# Patient Record
Sex: FEMALE | Race: WHITE | Employment: OTHER | URBAN - METROPOLITAN AREA
[De-identification: names, ages, dates, MRNs, and addresses within clinical notes are randomized per-mention and may not be internally consistent; named-entity substitution may affect disease eponyms.]

---

## 2017-01-09 ENCOUNTER — HOSPITAL ENCOUNTER (OUTPATIENT)
Dept: OTHER | Age: 77
Discharge: HOME | End: 2017-01-09
Attending: INTERNAL MEDICINE | Admitting: INTERNAL MEDICINE

## 2017-01-09 LAB
A/G RATIO: 1.4 (ref 1.1–2.2)
ALBUMIN SERPL-MCNC: 4.2 G/DL (ref 3.4–5)
ALP BLD-CCNC: 84 U/L (ref 40–129)
ALT SERPL-CCNC: 65 U/L (ref 10–40)
ANION GAP SERPL CALCULATED.3IONS-SCNC: 12 MMOL/L (ref 3–16)
AST SERPL-CCNC: 52 U/L (ref 15–37)
BILIRUB SERPL-MCNC: 0.9 MG/DL (ref 0–1)
BUN BLDV-MCNC: 13 MG/DL (ref 7–20)
CALCIUM SERPL-MCNC: 9.1 MG/DL (ref 8.3–10.6)
CHLORIDE BLD-SCNC: 104 MMOL/L (ref 99–110)
CHOLESTEROL, TOTAL: 169 MG/DL (ref 0–199)
CO2: 25 MMOL/L (ref 21–32)
CREAT SERPL-MCNC: <0.5 MG/DL (ref 0.6–1.2)
FOLATE: 7.47 NG/ML (ref 4.78–24.2)
GFR AFRICAN AMERICAN: >60
GFR NON-AFRICAN AMERICAN: >60
GLOBULIN: 3 G/DL
GLUCOSE BLD-MCNC: 91 MG/DL (ref 70–99)
HDLC SERPL-MCNC: 42 MG/DL (ref 40–60)
LDL CHOLESTEROL CALCULATED: 92 MG/DL
POTASSIUM SERPL-SCNC: 3.8 MMOL/L (ref 3.5–5.1)
SODIUM BLD-SCNC: 141 MMOL/L (ref 136–145)
TOTAL PROTEIN: 7.2 G/DL (ref 6.4–8.2)
TRIGL SERPL-MCNC: 173 MG/DL (ref 0–150)
TSH SERPL DL<=0.05 MIU/L-ACNC: 2.45 UIU/ML (ref 0.27–4.2)
VITAMIN B-12: 559 PG/ML (ref 211–911)
VITAMIN D 25-HYDROXY: 25.8 NG/ML
VLDLC SERPL CALC-MCNC: 35 MG/DL

## 2017-01-10 LAB
ESTIMATED AVERAGE GLUCOSE: 108.3 MG/DL
HBA1C MFR BLD: 5.4 %

## 2017-01-13 LAB
BASOPHILS ABSOLUTE: 0.1 K/UL (ref 0–0.2)
BASOPHILS RELATIVE PERCENT: 1 %
EOSINOPHILS ABSOLUTE: 0.3 K/UL (ref 0–0.6)
EOSINOPHILS RELATIVE PERCENT: 2.5 %
HCT VFR BLD CALC: 43.5 % (ref 36–48)
HEMATOLOGY PATH CONSULT: NO
HEMOGLOBIN: 14.5 G/DL (ref 12–16)
LYMPHOCYTES ABSOLUTE: 5.4 K/UL (ref 1–5.1)
LYMPHOCYTES RELATIVE PERCENT: 43.6 %
MCH RBC QN AUTO: 30.6 PG (ref 26–34)
MCHC RBC AUTO-ENTMCNC: 33.2 G/DL (ref 31–36)
MCV RBC AUTO: 92.3 FL (ref 80–100)
MONOCYTES ABSOLUTE: 0.7 K/UL (ref 0–1.3)
MONOCYTES RELATIVE PERCENT: 5.8 %
NEUTROPHILS ABSOLUTE: 5.8 K/UL (ref 1.7–7.7)
NEUTROPHILS RELATIVE PERCENT: 47.1 %
PDW BLD-RTO: 13.5 % (ref 12.4–15.4)
PLATELET # BLD: 256 K/UL (ref 135–450)
PMV BLD AUTO: 7.2 FL (ref 5–10.5)
RBC # BLD: 4.72 M/UL (ref 4–5.2)
WBC # BLD: 12.4 K/UL (ref 4–11)

## 2017-03-02 ENCOUNTER — TELEPHONE (OUTPATIENT)
Dept: CARDIOLOGY CLINIC | Age: 77
End: 2017-03-02

## 2017-03-08 ENCOUNTER — OFFICE VISIT (OUTPATIENT)
Dept: INTERNAL MEDICINE CLINIC | Age: 77
End: 2017-03-08

## 2017-03-08 VITALS
HEART RATE: 80 BPM | BODY MASS INDEX: 33.3 KG/M2 | SYSTOLIC BLOOD PRESSURE: 130 MMHG | WEIGHT: 188 LBS | DIASTOLIC BLOOD PRESSURE: 84 MMHG

## 2017-03-08 DIAGNOSIS — R20.2 PARESTHESIA OF BOTH HANDS: ICD-10-CM

## 2017-03-08 DIAGNOSIS — G24.8 FOCAL DYSTONIA: Primary | ICD-10-CM

## 2017-03-08 PROCEDURE — 4040F PNEUMOC VAC/ADMIN/RCVD: CPT | Performed by: INTERNAL MEDICINE

## 2017-03-08 PROCEDURE — G8599 NO ASA/ANTIPLAT THER USE RNG: HCPCS | Performed by: INTERNAL MEDICINE

## 2017-03-08 PROCEDURE — G8484 FLU IMMUNIZE NO ADMIN: HCPCS | Performed by: INTERNAL MEDICINE

## 2017-03-08 PROCEDURE — G8399 PT W/DXA RESULTS DOCUMENT: HCPCS | Performed by: INTERNAL MEDICINE

## 2017-03-08 PROCEDURE — 1036F TOBACCO NON-USER: CPT | Performed by: INTERNAL MEDICINE

## 2017-03-08 PROCEDURE — G8427 DOCREV CUR MEDS BY ELIG CLIN: HCPCS | Performed by: INTERNAL MEDICINE

## 2017-03-08 PROCEDURE — G8417 CALC BMI ABV UP PARAM F/U: HCPCS | Performed by: INTERNAL MEDICINE

## 2017-03-08 PROCEDURE — 1090F PRES/ABSN URINE INCON ASSESS: CPT | Performed by: INTERNAL MEDICINE

## 2017-03-08 PROCEDURE — 99213 OFFICE O/P EST LOW 20 MIN: CPT | Performed by: INTERNAL MEDICINE

## 2017-03-08 PROCEDURE — 1123F ACP DISCUSS/DSCN MKR DOCD: CPT | Performed by: INTERNAL MEDICINE

## 2017-05-02 ENCOUNTER — PROCEDURE VISIT (OUTPATIENT)
Dept: NEUROLOGY | Age: 77
End: 2017-05-02

## 2017-05-02 ENCOUNTER — OFFICE VISIT (OUTPATIENT)
Dept: NEUROLOGY | Age: 77
End: 2017-05-02

## 2017-05-02 VITALS
BODY MASS INDEX: 33.13 KG/M2 | HEIGHT: 63 IN | HEART RATE: 91 BPM | DIASTOLIC BLOOD PRESSURE: 76 MMHG | WEIGHT: 187 LBS | SYSTOLIC BLOOD PRESSURE: 118 MMHG

## 2017-05-02 DIAGNOSIS — G56.01 RIGHT CARPAL TUNNEL SYNDROME: Primary | ICD-10-CM

## 2017-05-02 DIAGNOSIS — G56.22 ULNAR NEUROPATHY OF LEFT UPPER EXTREMITY: ICD-10-CM

## 2017-05-02 DIAGNOSIS — G24.8 FOCAL DYSTONIA: ICD-10-CM

## 2017-05-02 DIAGNOSIS — R25.2 MUSCLE CRAMPING: Primary | ICD-10-CM

## 2017-05-02 DIAGNOSIS — G56.01 RIGHT CARPAL TUNNEL SYNDROME: ICD-10-CM

## 2017-05-02 PROCEDURE — 99204 OFFICE O/P NEW MOD 45 MIN: CPT | Performed by: PSYCHIATRY & NEUROLOGY

## 2017-05-02 PROCEDURE — 1123F ACP DISCUSS/DSCN MKR DOCD: CPT | Performed by: PSYCHIATRY & NEUROLOGY

## 2017-05-02 PROCEDURE — G8417 CALC BMI ABV UP PARAM F/U: HCPCS | Performed by: PSYCHIATRY & NEUROLOGY

## 2017-05-02 PROCEDURE — 4040F PNEUMOC VAC/ADMIN/RCVD: CPT | Performed by: PSYCHIATRY & NEUROLOGY

## 2017-05-02 PROCEDURE — 95911 NRV CNDJ TEST 9-10 STUDIES: CPT | Performed by: PSYCHIATRY & NEUROLOGY

## 2017-05-02 PROCEDURE — 95886 MUSC TEST DONE W/N TEST COMP: CPT | Performed by: PSYCHIATRY & NEUROLOGY

## 2017-05-02 PROCEDURE — G8427 DOCREV CUR MEDS BY ELIG CLIN: HCPCS | Performed by: PSYCHIATRY & NEUROLOGY

## 2017-05-02 PROCEDURE — G8599 NO ASA/ANTIPLAT THER USE RNG: HCPCS | Performed by: PSYCHIATRY & NEUROLOGY

## 2017-05-02 PROCEDURE — 1090F PRES/ABSN URINE INCON ASSESS: CPT | Performed by: PSYCHIATRY & NEUROLOGY

## 2017-05-02 PROCEDURE — 1036F TOBACCO NON-USER: CPT | Performed by: PSYCHIATRY & NEUROLOGY

## 2017-05-02 PROCEDURE — G8399 PT W/DXA RESULTS DOCUMENT: HCPCS | Performed by: PSYCHIATRY & NEUROLOGY

## 2017-05-22 RX ORDER — AMLODIPINE BESYLATE 5 MG/1
TABLET ORAL
Qty: 60 TABLET | Refills: 2 | Status: SHIPPED | OUTPATIENT
Start: 2017-05-22 | End: 2017-06-16 | Stop reason: SDUPTHER

## 2017-06-16 ENCOUNTER — OFFICE VISIT (OUTPATIENT)
Dept: INTERNAL MEDICINE CLINIC | Age: 77
End: 2017-06-16

## 2017-06-16 VITALS
HEART RATE: 88 BPM | OXYGEN SATURATION: 94 % | DIASTOLIC BLOOD PRESSURE: 79 MMHG | RESPIRATION RATE: 12 BRPM | WEIGHT: 191 LBS | HEIGHT: 63 IN | BODY MASS INDEX: 33.84 KG/M2 | SYSTOLIC BLOOD PRESSURE: 129 MMHG

## 2017-06-16 DIAGNOSIS — E78.5 DYSLIPIDEMIA: ICD-10-CM

## 2017-06-16 DIAGNOSIS — I25.10 CAD IN NATIVE ARTERY: ICD-10-CM

## 2017-06-16 DIAGNOSIS — M85.80 OSTEOPENIA: ICD-10-CM

## 2017-06-16 DIAGNOSIS — Z78.9 STATIN INTOLERANCE: ICD-10-CM

## 2017-06-16 DIAGNOSIS — R73.01 IMPAIRED FASTING BLOOD SUGAR: ICD-10-CM

## 2017-06-16 DIAGNOSIS — I10 BENIGN ESSENTIAL HTN: Primary | ICD-10-CM

## 2017-06-16 DIAGNOSIS — Z13.820 SCREENING FOR OSTEOPOROSIS: ICD-10-CM

## 2017-06-16 DIAGNOSIS — R10.33 PERIUMBILICAL ABDOMINAL PAIN: ICD-10-CM

## 2017-06-16 PROCEDURE — G8399 PT W/DXA RESULTS DOCUMENT: HCPCS | Performed by: INTERNAL MEDICINE

## 2017-06-16 PROCEDURE — 1036F TOBACCO NON-USER: CPT | Performed by: INTERNAL MEDICINE

## 2017-06-16 PROCEDURE — G8417 CALC BMI ABV UP PARAM F/U: HCPCS | Performed by: INTERNAL MEDICINE

## 2017-06-16 PROCEDURE — 99214 OFFICE O/P EST MOD 30 MIN: CPT | Performed by: INTERNAL MEDICINE

## 2017-06-16 PROCEDURE — G8599 NO ASA/ANTIPLAT THER USE RNG: HCPCS | Performed by: INTERNAL MEDICINE

## 2017-06-16 PROCEDURE — 1090F PRES/ABSN URINE INCON ASSESS: CPT | Performed by: INTERNAL MEDICINE

## 2017-06-16 PROCEDURE — 4040F PNEUMOC VAC/ADMIN/RCVD: CPT | Performed by: INTERNAL MEDICINE

## 2017-06-16 PROCEDURE — 1123F ACP DISCUSS/DSCN MKR DOCD: CPT | Performed by: INTERNAL MEDICINE

## 2017-06-16 PROCEDURE — G8427 DOCREV CUR MEDS BY ELIG CLIN: HCPCS | Performed by: INTERNAL MEDICINE

## 2017-06-16 ASSESSMENT — PATIENT HEALTH QUESTIONNAIRE - PHQ9
SUM OF ALL RESPONSES TO PHQ QUESTIONS 1-9: 0
1. LITTLE INTEREST OR PLEASURE IN DOING THINGS: 0
SUM OF ALL RESPONSES TO PHQ9 QUESTIONS 1 & 2: 0
2. FEELING DOWN, DEPRESSED OR HOPELESS: 0

## 2017-08-09 ENCOUNTER — HOSPITAL ENCOUNTER (OUTPATIENT)
Dept: GENERAL RADIOLOGY | Age: 77
Discharge: HOME | End: 2017-08-09
Attending: INTERNAL MEDICINE | Admitting: INTERNAL MEDICINE

## 2017-08-09 DIAGNOSIS — Z13.820 ENCOUNTER FOR SCREENING FOR OSTEOPOROSIS: ICD-10-CM

## 2017-08-09 DIAGNOSIS — Z13.820 SCREENING FOR OSTEOPOROSIS: ICD-10-CM

## 2017-08-09 DIAGNOSIS — M85.80 OSTEOPENIA: ICD-10-CM

## 2017-08-09 DIAGNOSIS — Z12.31 VISIT FOR SCREENING MAMMOGRAM: ICD-10-CM

## 2017-08-21 RX ORDER — AMLODIPINE BESYLATE 5 MG/1
TABLET ORAL
Qty: 60 TABLET | Refills: 3 | Status: SHIPPED | OUTPATIENT
Start: 2017-08-21 | End: 2018-03-16 | Stop reason: SDUPTHER

## 2017-10-31 ENCOUNTER — HOSPITAL ENCOUNTER (OUTPATIENT)
Dept: OTHER | Age: 77
Discharge: HOME | End: 2017-10-31
Attending: INTERNAL MEDICINE | Admitting: INTERNAL MEDICINE

## 2017-10-31 LAB
A/G RATIO: 1.5 (ref 1.1–2.2)
ALBUMIN SERPL-MCNC: 4.4 G/DL (ref 3.4–5)
ALP BLD-CCNC: 96 U/L (ref 40–129)
ALT SERPL-CCNC: 43 U/L (ref 10–40)
AMYLASE: 55 U/L (ref 25–115)
ANION GAP SERPL CALCULATED.3IONS-SCNC: 15 MMOL/L (ref 3–16)
AST SERPL-CCNC: 36 U/L (ref 15–37)
BASOPHILS ABSOLUTE: 0.1 K/UL (ref 0–0.2)
BASOPHILS RELATIVE PERCENT: 0.7 %
BILIRUB SERPL-MCNC: 0.9 MG/DL (ref 0–1)
BUN BLDV-MCNC: 17 MG/DL (ref 7–20)
CALCIUM SERPL-MCNC: 9.3 MG/DL (ref 8.3–10.6)
CHLORIDE BLD-SCNC: 100 MMOL/L (ref 99–110)
CHOLESTEROL, TOTAL: 176 MG/DL (ref 0–199)
CO2: 26 MMOL/L (ref 21–32)
CREAT SERPL-MCNC: 0.5 MG/DL (ref 0.6–1.2)
EOSINOPHILS ABSOLUTE: 0.3 K/UL (ref 0–0.6)
EOSINOPHILS RELATIVE PERCENT: 1.9 %
GFR AFRICAN AMERICAN: >60
GFR NON-AFRICAN AMERICAN: >60
GLOBULIN: 3 G/DL
GLUCOSE BLD-MCNC: 94 MG/DL (ref 70–99)
HCT VFR BLD CALC: 45.9 % (ref 36–48)
HDLC SERPL-MCNC: 40 MG/DL (ref 40–60)
HEMATOLOGY PATH CONSULT: NO
HEMOGLOBIN: 15.5 G/DL (ref 12–16)
LDL CHOLESTEROL CALCULATED: 96 MG/DL
LIPASE: 25 U/L (ref 13–60)
LYMPHOCYTES ABSOLUTE: 5.8 K/UL (ref 1–5.1)
LYMPHOCYTES RELATIVE PERCENT: 43.4 %
MCH RBC QN AUTO: 31.6 PG (ref 26–34)
MCHC RBC AUTO-ENTMCNC: 33.9 G/DL (ref 31–36)
MCV RBC AUTO: 93.2 FL (ref 80–100)
MONOCYTES ABSOLUTE: 0.8 K/UL (ref 0–1.3)
MONOCYTES RELATIVE PERCENT: 6.1 %
NEUTROPHILS ABSOLUTE: 6.4 K/UL (ref 1.7–7.7)
NEUTROPHILS RELATIVE PERCENT: 47.9 %
PDW BLD-RTO: 13.2 % (ref 12.4–15.4)
PLATELET # BLD: 270 K/UL (ref 135–450)
PMV BLD AUTO: 7.6 FL (ref 5–10.5)
POTASSIUM SERPL-SCNC: 3.8 MMOL/L (ref 3.5–5.1)
RBC # BLD: 4.92 M/UL (ref 4–5.2)
SODIUM BLD-SCNC: 141 MMOL/L (ref 136–145)
TOTAL PROTEIN: 7.4 G/DL (ref 6.4–8.2)
TRIGL SERPL-MCNC: 199 MG/DL (ref 0–150)
VLDLC SERPL CALC-MCNC: 40 MG/DL
WBC # BLD: 13.3 K/UL (ref 4–11)

## 2017-11-01 LAB
ESTIMATED AVERAGE GLUCOSE: 111.2 MG/DL
HBA1C MFR BLD: 5.5 %

## 2017-11-02 LAB — TISSUE TRANSGLUTAMINASE IGA: 1 U/ML (ref 0–3)

## 2017-12-01 RX ORDER — AMLODIPINE BESYLATE 5 MG/1
TABLET ORAL
Qty: 60 TABLET | Refills: 3 | Status: SHIPPED | OUTPATIENT
Start: 2017-12-01 | End: 2018-03-16 | Stop reason: SDUPTHER

## 2018-03-16 ENCOUNTER — OFFICE VISIT (OUTPATIENT)
Dept: INTERNAL MEDICINE CLINIC | Age: 78
End: 2018-03-16

## 2018-03-16 VITALS
HEART RATE: 66 BPM | DIASTOLIC BLOOD PRESSURE: 82 MMHG | SYSTOLIC BLOOD PRESSURE: 126 MMHG | WEIGHT: 186 LBS | HEIGHT: 63 IN | BODY MASS INDEX: 32.96 KG/M2 | RESPIRATION RATE: 16 BRPM

## 2018-03-16 DIAGNOSIS — I10 BENIGN ESSENTIAL HTN: Primary | ICD-10-CM

## 2018-03-16 DIAGNOSIS — Z78.9 STATIN INTOLERANCE: ICD-10-CM

## 2018-03-16 DIAGNOSIS — Z23 NEED FOR TDAP VACCINATION: ICD-10-CM

## 2018-03-16 DIAGNOSIS — R21 SKIN RASH: ICD-10-CM

## 2018-03-16 DIAGNOSIS — I25.10 CAD IN NATIVE ARTERY: ICD-10-CM

## 2018-03-16 DIAGNOSIS — R32 URINARY INCONTINENCE, UNSPECIFIED TYPE: ICD-10-CM

## 2018-03-16 DIAGNOSIS — R73.01 IMPAIRED FASTING BLOOD SUGAR: ICD-10-CM

## 2018-03-16 DIAGNOSIS — E78.5 DYSLIPIDEMIA: ICD-10-CM

## 2018-03-16 DIAGNOSIS — M81.0 OSTEOPOROSIS, POSTMENOPAUSAL: ICD-10-CM

## 2018-03-16 DIAGNOSIS — M85.80 OSTEOPENIA, UNSPECIFIED LOCATION: ICD-10-CM

## 2018-03-16 PROCEDURE — 4040F PNEUMOC VAC/ADMIN/RCVD: CPT | Performed by: INTERNAL MEDICINE

## 2018-03-16 PROCEDURE — G8484 FLU IMMUNIZE NO ADMIN: HCPCS | Performed by: INTERNAL MEDICINE

## 2018-03-16 PROCEDURE — 1090F PRES/ABSN URINE INCON ASSESS: CPT | Performed by: INTERNAL MEDICINE

## 2018-03-16 PROCEDURE — G8599 NO ASA/ANTIPLAT THER USE RNG: HCPCS | Performed by: INTERNAL MEDICINE

## 2018-03-16 PROCEDURE — G8399 PT W/DXA RESULTS DOCUMENT: HCPCS | Performed by: INTERNAL MEDICINE

## 2018-03-16 PROCEDURE — G8427 DOCREV CUR MEDS BY ELIG CLIN: HCPCS | Performed by: INTERNAL MEDICINE

## 2018-03-16 PROCEDURE — 1036F TOBACCO NON-USER: CPT | Performed by: INTERNAL MEDICINE

## 2018-03-16 PROCEDURE — G8417 CALC BMI ABV UP PARAM F/U: HCPCS | Performed by: INTERNAL MEDICINE

## 2018-03-16 PROCEDURE — 99214 OFFICE O/P EST MOD 30 MIN: CPT | Performed by: INTERNAL MEDICINE

## 2018-03-16 PROCEDURE — 1123F ACP DISCUSS/DSCN MKR DOCD: CPT | Performed by: INTERNAL MEDICINE

## 2018-03-16 RX ORDER — CYANOCOBALAMIN (VITAMIN B-12) 1000 MCG
1000 TABLET, EXTENDED RELEASE ORAL DAILY
COMMUNITY
End: 2021-10-26

## 2018-03-16 RX ORDER — LORATADINE 10 MG/1
10 TABLET ORAL DAILY
COMMUNITY
End: 2019-12-11 | Stop reason: ALTCHOICE

## 2018-03-16 NOTE — PATIENT INSTRUCTIONS
causing a serious injury or death. The safety of vaccines is always being monitored. For more information, visit: www.cdc.gov/vaccinesafety. What if there is a serious problem? What should I look for? · Look for anything that concerns you, such as signs of a severe allergic reaction, very high fever, or unusual behavior. Signs of a severe allergic reaction can include hives, swelling of the face and throat, difficulty breathing, a fast heartbeat, dizziness, and weakness. These would usually start a few minutes to a few hours after the vaccination. What should I do? · If you think it is a severe allergic reaction or other emergency that can't wait, call 9-1-1 or get the person to the nearest hospital. Otherwise, call your doctor. · Afterward, the reaction should be reported to the Vaccine Adverse Event Reporting System (VAERS). Your doctor might file this report, or you can do it yourself through the VAERS web site at www.vaers. hhs.gov, or by calling 0-230.694.4022. VAERS does not give medical advice. The National Vaccine Injury Compensation Program  The National Vaccine Injury Compensation Program (VICP) is a federal program that was created to compensate people who may have been injured by certain vaccines. Persons who believe they may have been injured by a vaccine can learn about the program and about filing a claim by calling 8-806.161.2262 or visiting the [employer] website at www.[facility].gov/vaccinecompensation. There is a time limit to file a claim for compensation. How can I learn more? · Ask your doctor. He or she can give you the vaccine package insert or suggest other sources of information. · Call your local or state health department. · Contact the Centers for Disease Control and Prevention (CDC):  ¨ Call 4-349.352.8014 (1-800-CDC-INFO) or  ¨ Visit CDC's website at www.cdc.gov/vaccines  Vaccine Information Statement (Interim)  Tdap Vaccine  (2/24/15)  [address] GILB[address] Stanley 732OF-53  Department of [facility] and

## 2018-03-19 ENCOUNTER — HOSPITAL ENCOUNTER (OUTPATIENT)
Dept: OTHER | Age: 78
Discharge: HOME | End: 2018-03-19
Attending: INTERNAL MEDICINE | Admitting: INTERNAL MEDICINE

## 2018-03-19 LAB
A/G RATIO: 1.5 (ref 1.1–2.2)
ALBUMIN SERPL-MCNC: 4.3 G/DL (ref 3.4–5)
ALP BLD-CCNC: 77 U/L (ref 40–129)
ALT SERPL-CCNC: 28 U/L (ref 10–40)
ANION GAP SERPL CALCULATED.3IONS-SCNC: 13 MMOL/L (ref 3–16)
AST SERPL-CCNC: 26 U/L (ref 15–37)
BANDED NEUTROPHILS RELATIVE PERCENT: 2 % (ref 0–7)
BASOPHILS ABSOLUTE: 0 K/UL (ref 0–0.2)
BASOPHILS RELATIVE PERCENT: 0 %
BILIRUB SERPL-MCNC: 0.8 MG/DL (ref 0–1)
BUN BLDV-MCNC: 19 MG/DL (ref 7–20)
CALCIUM SERPL-MCNC: 8.8 MG/DL (ref 8.3–10.6)
CHLORIDE BLD-SCNC: 104 MMOL/L (ref 99–110)
CHOLESTEROL, TOTAL: 162 MG/DL (ref 0–199)
CO2: 26 MMOL/L (ref 21–32)
CREAT SERPL-MCNC: 0.5 MG/DL (ref 0.6–1.2)
EOSINOPHILS ABSOLUTE: 0.4 K/UL (ref 0–0.6)
EOSINOPHILS RELATIVE PERCENT: 3 %
ESTIMATED AVERAGE GLUCOSE: 96.8 MG/DL
GFR AFRICAN AMERICAN: >60
GFR NON-AFRICAN AMERICAN: >60
GLOBULIN: 2.9 G/DL
GLUCOSE BLD-MCNC: 99 MG/DL (ref 70–99)
HBA1C MFR BLD: 5 %
HCT VFR BLD CALC: 45.4 % (ref 36–48)
HDLC SERPL-MCNC: 48 MG/DL (ref 40–60)
HEMATOLOGY PATH CONSULT: NO
HEMOGLOBIN: 15.5 G/DL (ref 12–16)
LDL CHOLESTEROL CALCULATED: 85 MG/DL
LYMPHOCYTES ABSOLUTE: 4.8 K/UL (ref 1–5.1)
LYMPHOCYTES RELATIVE PERCENT: 39 %
MCH RBC QN AUTO: 31.8 PG (ref 26–34)
MCHC RBC AUTO-ENTMCNC: 34.2 G/DL (ref 31–36)
MCV RBC AUTO: 93 FL (ref 80–100)
MONOCYTES ABSOLUTE: 0.9 K/UL (ref 0–1.3)
MONOCYTES RELATIVE PERCENT: 7 %
NEUTROPHILS ABSOLUTE: 6.3 K/UL (ref 1.7–7.7)
NEUTROPHILS RELATIVE PERCENT: 49 %
PDW BLD-RTO: 13.4 % (ref 12.4–15.4)
PLATELET # BLD: 298 K/UL (ref 135–450)
PLATELET SLIDE REVIEW: ADEQUATE
PMV BLD AUTO: 7.1 FL (ref 5–10.5)
POTASSIUM SERPL-SCNC: 4.3 MMOL/L (ref 3.5–5.1)
RBC # BLD: 4.88 M/UL (ref 4–5.2)
SLIDE REVIEW: ABNORMAL
SMUDGE CELLS: PRESENT
SODIUM BLD-SCNC: 143 MMOL/L (ref 136–145)
TOTAL PROTEIN: 7.2 G/DL (ref 6.4–8.2)
TRIGL SERPL-MCNC: 145 MG/DL (ref 0–150)
TSH SERPL DL<=0.05 MIU/L-ACNC: 2.62 UIU/ML (ref 0.27–4.2)
VITAMIN D 25-HYDROXY: 25.8 NG/ML
VLDLC SERPL CALC-MCNC: 29 MG/DL
WBC # BLD: 12.3 K/UL (ref 4–11)

## 2018-03-26 ENCOUNTER — OFFICE VISIT (OUTPATIENT)
Dept: INTERNAL MEDICINE CLINIC | Age: 78
End: 2018-03-26

## 2018-03-26 VITALS — DIASTOLIC BLOOD PRESSURE: 80 MMHG | BODY MASS INDEX: 32.76 KG/M2 | WEIGHT: 182 LBS | SYSTOLIC BLOOD PRESSURE: 144 MMHG

## 2018-03-26 DIAGNOSIS — R79.9 ABNORMAL BLOOD SMEAR: ICD-10-CM

## 2018-03-26 DIAGNOSIS — D72.829 LEUKOCYTOSIS, UNSPECIFIED TYPE: Primary | ICD-10-CM

## 2018-03-26 PROCEDURE — 4040F PNEUMOC VAC/ADMIN/RCVD: CPT | Performed by: INTERNAL MEDICINE

## 2018-03-26 PROCEDURE — G8417 CALC BMI ABV UP PARAM F/U: HCPCS | Performed by: INTERNAL MEDICINE

## 2018-03-26 PROCEDURE — G8484 FLU IMMUNIZE NO ADMIN: HCPCS | Performed by: INTERNAL MEDICINE

## 2018-03-26 PROCEDURE — 1123F ACP DISCUSS/DSCN MKR DOCD: CPT | Performed by: INTERNAL MEDICINE

## 2018-03-26 PROCEDURE — G8599 NO ASA/ANTIPLAT THER USE RNG: HCPCS | Performed by: INTERNAL MEDICINE

## 2018-03-26 PROCEDURE — 1090F PRES/ABSN URINE INCON ASSESS: CPT | Performed by: INTERNAL MEDICINE

## 2018-03-26 PROCEDURE — G8428 CUR MEDS NOT DOCUMENT: HCPCS | Performed by: INTERNAL MEDICINE

## 2018-03-26 PROCEDURE — 99213 OFFICE O/P EST LOW 20 MIN: CPT | Performed by: INTERNAL MEDICINE

## 2018-03-26 PROCEDURE — 1036F TOBACCO NON-USER: CPT | Performed by: INTERNAL MEDICINE

## 2018-03-26 PROCEDURE — G8399 PT W/DXA RESULTS DOCUMENT: HCPCS | Performed by: INTERNAL MEDICINE

## 2018-03-26 NOTE — PATIENT INSTRUCTIONS
Patient Education        Learning About High White Blood Cell Counts  What are white blood cells? White blood cells (leukocytes) help protect your body from infection. Normally, when germs get inside your body, your body makes more white blood cells that search for and destroy the germs. Less often, there are medical problems where the body may make a lot more white blood cells than it needs. What happens when you have a high white blood cell count? Your white blood cell count may be high because your body is fighting an infection. But other things can cause it, such as some medicines, burns, an illness, or other health problems. When your doctor sees that your white blood cell count is high, he or she will try to find out why, and then treat the cause. What are the symptoms? A high white blood cell count alone doesn't cause any symptoms. The symptoms you feel may come from the medical problem that your white blood cells are fighting. For example, if you have pneumonia, you may have a fever and trouble breathing. These are symptoms of pneumonia, not of a high white blood cell count. How is it treated? · Your doctor may do more tests to find the problem that's making your white blood cell count high. Once your doctor finds the problem, he or she may be able to treat it. · Part of your treatment may be telling your doctor if you feel worse. Watch your temperature, and call your doctor if your fever goes up and stays up. Follow-up care is a key part of your treatment and safety. Be sure to make and go to all appointments, and call your doctor if you are having problems. It's also a good idea to know your test results and keep a list of the medicines you take. Where can you learn more? Go to [email or web address] and sign in to your [employer] account. Enter B699 in the [employer] box to learn more about \"Learning About High White Blood Cell Counts. \"     If you do not have an account, please click on the \"Sign Up Now\" link. Current as of: October 14, 2016  Content Version: 11.5  © 7908-6315 Healthwise, Incorporated. Care instructions adapted under license by [facility] ([facility]). If you have questions about a medical condition or this instruction, always ask your healthcare professional. [address] any warranty or liability for your use of this information.

## 2018-03-26 NOTE — PROGRESS NOTES
Multiple Vitamins-Minerals (MULTIVITAMIN ADULT PO) Take by mouth  Historical Provider, MD   loratadine (CLARITIN) 10 MG tablet Take 10 mg by mouth daily  Historical Provider, MD   amLODIPine (NORVASC) 5 MG tablet TAKE 1 TABLET BY MOUTH TWICE DAILY  Kev Villafana DO   Cranberry 125 MG TABS Take by mouth  Historical Provider, MD   aspirin EC 81 MG EC tablet Take 1 tablet by mouth daily. Una Murray NP   Phytosterol Esters (CHOLEST CARE PO) Take  by mouth. Historical Provider, MD   nitroGLYCERIN (NITROSTAT) 0.4 MG SL tablet Place 1 tablet under the tongue every 5 minutes as needed for Chest pain. Una Murray NP           Review of Systems - As per HPI  GEN: Pt denies fever, chills or night sweats. Denies weight changes. Appetite good  HEENT: Pt denies visual and auditory changes, epistaxis, upper respiratory symptoms and dysphagia  CV: Pt denies CP, SOB, KRUEGER, orthopnea palpitations, LE swelling, and claudication. PULM: Pt denies cough, wheezing or sputum production  GI: Pt denies N/V/D, heart burn, rectal bleeding, or melena. NEURO: Pt denies unilateral weakness, paresthesia and dizziness. OBJECTIVE:  BP (!) 144/80   Wt 182 lb (82.6 kg)   BMI 32.76 kg/m²    Wt Readings from Last 3 Encounters:   03/26/18 182 lb (82.6 kg)   03/16/18 186 lb (84.4 kg)   06/16/17 191 lb (86.6 kg)       GEN: NAD, A&O, Non-toxic  HEENT: NC/AT, CHRISTIANE, EOMI, Oral cavity Clear,  TM's NL, Nasal cavity clear. NECK: Supple. No thyromegaly. LYMPH: No C/SC/A/F nodes. CV: S1 S2 NL, RRR. No murmurs, clicks or rubs. PULM: CTA, symmentric air exchange      ASSESSMENT[de-identified]  Mylinda Massing was seen today for results. Diagnoses and all orders for this visit:    Leukocytosis, unspecified type  -     Yasmine Pascual MD (ASHTYN)    Abnormal blood smear  -     Yasmine Pascual MD (ASHTYN)          15 minutes spent with the pt.   13 minutes spent reviewing test results and discussing plan of care and counseled on Elevated white blood cell count and different etiologies. Discussed medications with patient who voiced understanding of their use, indication and potential side effects. Pt also understands the above recommendations. All questions answered.

## 2018-04-03 ENCOUNTER — OFFICE VISIT (OUTPATIENT)
Dept: CARDIOLOGY CLINIC | Age: 78
End: 2018-04-03

## 2018-04-03 VITALS
SYSTOLIC BLOOD PRESSURE: 110 MMHG | DIASTOLIC BLOOD PRESSURE: 70 MMHG | BODY MASS INDEX: 32.6 KG/M2 | HEART RATE: 68 BPM | WEIGHT: 184 LBS | HEIGHT: 63 IN

## 2018-04-03 DIAGNOSIS — Z78.9 STATIN INTOLERANCE: Primary | ICD-10-CM

## 2018-04-03 DIAGNOSIS — I25.10 CAD IN NATIVE ARTERY: ICD-10-CM

## 2018-04-03 PROCEDURE — 4040F PNEUMOC VAC/ADMIN/RCVD: CPT | Performed by: NURSE PRACTITIONER

## 2018-04-03 PROCEDURE — 1036F TOBACCO NON-USER: CPT | Performed by: NURSE PRACTITIONER

## 2018-04-03 PROCEDURE — 1090F PRES/ABSN URINE INCON ASSESS: CPT | Performed by: NURSE PRACTITIONER

## 2018-04-03 PROCEDURE — G8417 CALC BMI ABV UP PARAM F/U: HCPCS | Performed by: NURSE PRACTITIONER

## 2018-04-03 PROCEDURE — G8599 NO ASA/ANTIPLAT THER USE RNG: HCPCS | Performed by: NURSE PRACTITIONER

## 2018-04-03 PROCEDURE — 99214 OFFICE O/P EST MOD 30 MIN: CPT | Performed by: NURSE PRACTITIONER

## 2018-04-03 PROCEDURE — G8427 DOCREV CUR MEDS BY ELIG CLIN: HCPCS | Performed by: NURSE PRACTITIONER

## 2018-04-03 PROCEDURE — 1123F ACP DISCUSS/DSCN MKR DOCD: CPT | Performed by: NURSE PRACTITIONER

## 2018-04-03 PROCEDURE — G8399 PT W/DXA RESULTS DOCUMENT: HCPCS | Performed by: NURSE PRACTITIONER

## 2018-04-16 ENCOUNTER — HOSPITAL ENCOUNTER (OUTPATIENT)
Dept: CT IMAGING | Age: 78
Discharge: HOME | End: 2018-04-16
Attending: INTERNAL MEDICINE | Admitting: INTERNAL MEDICINE

## 2018-04-16 DIAGNOSIS — D72.820 LYMPHOCYTOSIS (SYMPTOMATIC): ICD-10-CM

## 2018-04-16 DIAGNOSIS — D72.820 LYMPHOCYTOSIS: ICD-10-CM

## 2018-08-29 ENCOUNTER — TELEPHONE (OUTPATIENT)
Dept: INTERNAL MEDICINE CLINIC | Age: 78
End: 2018-08-29

## 2018-08-29 RX ORDER — AMLODIPINE BESYLATE 5 MG/1
TABLET ORAL
Qty: 180 TABLET | Refills: 0 | Status: SHIPPED | OUTPATIENT
Start: 2018-08-29 | End: 2018-11-16

## 2018-08-29 NOTE — TELEPHONE ENCOUNTER
Eric Sotomayor stated that pt is requesting a 90 day supply instead of 30 day for her Amlodipine. Please call phone# provided.

## 2018-11-07 ENCOUNTER — TELEPHONE (OUTPATIENT)
Dept: INTERNAL MEDICINE CLINIC | Age: 78
End: 2018-11-07

## 2018-11-08 ENCOUNTER — TELEPHONE (OUTPATIENT)
Dept: INTERNAL MEDICINE CLINIC | Age: 78
End: 2018-11-08

## 2018-11-08 ENCOUNTER — TELEPHONE (OUTPATIENT)
Dept: CARDIOLOGY CLINIC | Age: 78
End: 2018-11-08

## 2018-11-08 NOTE — TELEPHONE ENCOUNTER
Patient states she is now taking 1 amlodipine daily since she tried other things and her blood pressure dropped to 130/80 this morning but did not stay there. Patient can be reached at phone number provided with any questions or concerns.

## 2018-11-16 ENCOUNTER — OFFICE VISIT (OUTPATIENT)
Dept: CARDIOLOGY CLINIC | Age: 78
End: 2018-11-16
Payer: MEDICARE

## 2018-11-16 VITALS
WEIGHT: 189 LBS | SYSTOLIC BLOOD PRESSURE: 110 MMHG | HEART RATE: 64 BPM | HEIGHT: 63 IN | BODY MASS INDEX: 33.49 KG/M2 | DIASTOLIC BLOOD PRESSURE: 70 MMHG

## 2018-11-16 DIAGNOSIS — Z78.9 STATIN INTOLERANCE: ICD-10-CM

## 2018-11-16 DIAGNOSIS — I25.10 CAD IN NATIVE ARTERY: ICD-10-CM

## 2018-11-16 DIAGNOSIS — I10 BENIGN ESSENTIAL HTN: ICD-10-CM

## 2018-11-16 DIAGNOSIS — R07.9 CHEST PAIN, UNSPECIFIED TYPE: Primary | ICD-10-CM

## 2018-11-16 PROCEDURE — G8484 FLU IMMUNIZE NO ADMIN: HCPCS | Performed by: NURSE PRACTITIONER

## 2018-11-16 PROCEDURE — G8417 CALC BMI ABV UP PARAM F/U: HCPCS | Performed by: NURSE PRACTITIONER

## 2018-11-16 PROCEDURE — 1123F ACP DISCUSS/DSCN MKR DOCD: CPT | Performed by: NURSE PRACTITIONER

## 2018-11-16 PROCEDURE — 1101F PT FALLS ASSESS-DOCD LE1/YR: CPT | Performed by: NURSE PRACTITIONER

## 2018-11-16 PROCEDURE — 4040F PNEUMOC VAC/ADMIN/RCVD: CPT | Performed by: NURSE PRACTITIONER

## 2018-11-16 PROCEDURE — G8399 PT W/DXA RESULTS DOCUMENT: HCPCS | Performed by: NURSE PRACTITIONER

## 2018-11-16 PROCEDURE — 1090F PRES/ABSN URINE INCON ASSESS: CPT | Performed by: NURSE PRACTITIONER

## 2018-11-16 PROCEDURE — 1036F TOBACCO NON-USER: CPT | Performed by: NURSE PRACTITIONER

## 2018-11-16 PROCEDURE — G8427 DOCREV CUR MEDS BY ELIG CLIN: HCPCS | Performed by: NURSE PRACTITIONER

## 2018-11-16 PROCEDURE — 99214 OFFICE O/P EST MOD 30 MIN: CPT | Performed by: NURSE PRACTITIONER

## 2018-11-16 PROCEDURE — G8599 NO ASA/ANTIPLAT THER USE RNG: HCPCS | Performed by: NURSE PRACTITIONER

## 2018-11-16 RX ORDER — VITAMIN B COMPLEX
TABLET ORAL
COMMUNITY

## 2018-11-16 RX ORDER — MAGNESIUM 30 MG
30 TABLET ORAL 2 TIMES DAILY
COMMUNITY

## 2018-11-26 ENCOUNTER — OFFICE VISIT (OUTPATIENT)
Dept: INTERNAL MEDICINE CLINIC | Age: 78
End: 2018-11-26
Payer: MEDICARE

## 2018-11-26 VITALS
DIASTOLIC BLOOD PRESSURE: 82 MMHG | BODY MASS INDEX: 35.51 KG/M2 | RESPIRATION RATE: 12 BRPM | HEART RATE: 88 BPM | HEIGHT: 62 IN | WEIGHT: 193 LBS | SYSTOLIC BLOOD PRESSURE: 132 MMHG

## 2018-11-26 DIAGNOSIS — Z23 FLU VACCINE NEED: ICD-10-CM

## 2018-11-26 DIAGNOSIS — E78.5 DYSLIPIDEMIA: ICD-10-CM

## 2018-11-26 DIAGNOSIS — I25.10 CAD IN NATIVE ARTERY: ICD-10-CM

## 2018-11-26 DIAGNOSIS — Z78.9 STATIN INTOLERANCE: ICD-10-CM

## 2018-11-26 DIAGNOSIS — M85.80 OSTEOPENIA, UNSPECIFIED LOCATION: ICD-10-CM

## 2018-11-26 DIAGNOSIS — I10 BENIGN ESSENTIAL HTN: Primary | ICD-10-CM

## 2018-11-26 DIAGNOSIS — R73.01 IMPAIRED FASTING BLOOD SUGAR: ICD-10-CM

## 2018-11-26 PROCEDURE — 99214 OFFICE O/P EST MOD 30 MIN: CPT | Performed by: INTERNAL MEDICINE

## 2018-11-26 PROCEDURE — G8417 CALC BMI ABV UP PARAM F/U: HCPCS | Performed by: INTERNAL MEDICINE

## 2018-11-26 PROCEDURE — G8599 NO ASA/ANTIPLAT THER USE RNG: HCPCS | Performed by: INTERNAL MEDICINE

## 2018-11-26 PROCEDURE — 4040F PNEUMOC VAC/ADMIN/RCVD: CPT | Performed by: INTERNAL MEDICINE

## 2018-11-26 PROCEDURE — G8427 DOCREV CUR MEDS BY ELIG CLIN: HCPCS | Performed by: INTERNAL MEDICINE

## 2018-11-26 PROCEDURE — G8482 FLU IMMUNIZE ORDER/ADMIN: HCPCS | Performed by: INTERNAL MEDICINE

## 2018-11-26 PROCEDURE — G0008 ADMIN INFLUENZA VIRUS VAC: HCPCS | Performed by: INTERNAL MEDICINE

## 2018-11-26 PROCEDURE — 1123F ACP DISCUSS/DSCN MKR DOCD: CPT | Performed by: INTERNAL MEDICINE

## 2018-11-26 PROCEDURE — 1036F TOBACCO NON-USER: CPT | Performed by: INTERNAL MEDICINE

## 2018-11-26 PROCEDURE — G8399 PT W/DXA RESULTS DOCUMENT: HCPCS | Performed by: INTERNAL MEDICINE

## 2018-11-26 PROCEDURE — 90662 IIV NO PRSV INCREASED AG IM: CPT | Performed by: INTERNAL MEDICINE

## 2018-11-26 PROCEDURE — 1090F PRES/ABSN URINE INCON ASSESS: CPT | Performed by: INTERNAL MEDICINE

## 2018-11-26 PROCEDURE — 1101F PT FALLS ASSESS-DOCD LE1/YR: CPT | Performed by: INTERNAL MEDICINE

## 2018-11-26 ASSESSMENT — PATIENT HEALTH QUESTIONNAIRE - PHQ9
1. LITTLE INTEREST OR PLEASURE IN DOING THINGS: 0
SUM OF ALL RESPONSES TO PHQ9 QUESTIONS 1 & 2: 0
SUM OF ALL RESPONSES TO PHQ QUESTIONS 1-9: 0
SUM OF ALL RESPONSES TO PHQ QUESTIONS 1-9: 0
2. FEELING DOWN, DEPRESSED OR HOPELESS: 0

## 2018-11-26 NOTE — PATIENT INSTRUCTIONS
A serving is 1 slice of bread, 1 ounce of dry cereal, or ½ cup of cooked rice, pasta, or cooked cereal. Try to choose whole-grain products as much as possible. · Limit lean meat, poultry, and fish to 2 servings each day. A serving is 3 ounces, about the size of a deck of cards. · Eat 4 to 5 servings of nuts, seeds, and legumes (cooked dried beans, lentils, and split peas) each week. A serving is 1/3 cup of nuts, 2 tablespoons of seeds, or ½ cup of cooked beans or peas. · Limit fats and oils to 2 to 3 servings each day. A serving is 1 teaspoon of vegetable oil or 2 tablespoons of salad dressing. · Limit sweets and added sugars to 5 servings or less a week. A serving is 1 tablespoon jelly or jam, ½ cup sorbet, or 1 cup of lemonade. · Eat less than 2,300 milligrams (mg) of sodium a day. If you limit your sodium to 1,500 mg a day, you can lower your blood pressure even more. Tips for success  · Start small. Do not try to make dramatic changes to your diet all at once. You might feel that you are missing out on your favorite foods and then be more likely to not follow the plan. Make small changes, and stick with them. Once those changes become habit, add a few more changes. · Try some of the following:  ? Make it a goal to eat a fruit or vegetable at every meal and at snacks. This will make it easy to get the recommended amount of fruits and vegetables each day. ? Try yogurt topped with fruit and nuts for a snack or healthy dessert. ? Add lettuce, tomato, cucumber, and onion to sandwiches. ? Combine a ready-made pizza crust with low-fat mozzarella cheese and lots of vegetable toppings. Try using tomatoes, squash, spinach, broccoli, carrots, cauliflower, and onions. ? Have a variety of cut-up vegetables with a low-fat dip as an appetizer instead of chips and dip. ? Sprinkle sunflower seeds or chopped almonds over salads. Or try adding chopped walnuts or almonds to cooked vegetables.   ? Try some vegetarian meals using beans and peas. Add garbanzo or kidney beans to salads. Make burritos and tacos with mashed lugo beans or black beans. Where can you learn more? Go to [email or web address]://[email or web address].[employer]. org and sign in to your [employer] account. Enter Q795 in the [facility] box to learn more about \"DASH Diet: Care Instructions. \"     If you do not have an account, please click on the \"Sign Up Now\" link. Current as of: December 6, 2017  Content Version: 11.8  © 1551-7080 [employer]. Care instructions adapted under license by [facility] ([facility]). If you have questions about a medical condition or this instruction, always ask your healthcare professional. [address] any warranty or liability for your use of this information. Patient Education        Learning About High Blood Pressure  What is high blood pressure? Blood pressure is a measure of how hard the blood pushes against the walls of your arteries. It's normal for blood pressure to go up and down throughout the day, but if it stays up, you have high blood pressure. Another name for high blood pressure is hypertension. Two numbers tell you your blood pressure. The first number is the systolic pressure. It shows how hard the blood pushes when your heart is pumping. The second number is the diastolic pressure. It shows how hard the blood pushes between heartbeats, when your heart is relaxed and filling with blood. A blood pressure of less than 120/80 (say \"120 over 80\") is ideal for an adult. High blood pressure is 130/80 or higher. You have high blood pressure if your top number is 130 or higher or your bottom number is 80 or higher, or both. What happens when you have high blood pressure? · Blood flows through your arteries with too much force. Over time, this damages the walls of your arteries. But you can't feel it. High blood pressure usually doesn't cause symptoms.   · Fat and calcium start to build up medicines. Follow-up care is a key part of your treatment and safety. Be sure to make and go to all appointments, and call your doctor if you are having problems. It's also a good idea to know your test results and keep a list of the medicines you take. How can you care for yourself at home? · Eat a variety of foods every day. Good choices include fruits, vegetables, whole grains (like oatmeal), dried beans and peas, nuts and seeds, soy products (like tofu), and fat-free or low-fat dairy products. · Replace butter, margarine, and hydrogenated or partially hydrogenated oils with olive and canola oils. (Canola oil margarine without trans fat is fine.)  · Replace red meat with fish, poultry, and soy protein (like tofu). · Limit processed and packaged foods like chips, crackers, and cookies. · Bake, broil, or steam foods. Don't lindsay them. · Be physically active. Get at least 30 minutes of exercise on most days of the week. Walking is a good choice. You also may want to do other activities, such as running, swimming, cycling, or playing tennis or team sports. · Stay at a healthy weight or lose weight by making the changes in eating and physical activity listed above. Losing just a small amount of weight, even 5 to 10 pounds, can reduce your risk for having a heart attack or stroke. · Do not smoke. When should you call for help? Watch closely for changes in your health, and be sure to contact your doctor if:    · You need help making lifestyle changes.     · You have questions about your medicine. Where can you learn more? Go to [email or web address].[email or web address] org and sign in to your [employer] account. Enter C520 in the [employer] box to learn more about \"High Cholesterol: Care Instructions. \"     If you do not have an account, please click on the \"Sign Up Now\" link. Current as of: December 6, 2017  Content Version: 11.8  © 9110-3818 Healthwise, [employer].  Care instructions adapted under license by [facility] ([facility]). If you have questions about a medical condition or this instruction, always ask your healthcare professional. [place] [address] any warranty or liability for your use of this information.

## 2018-11-26 NOTE — PROGRESS NOTES
history---->  She has H/O PTCA with three stents 7/30/2012 after suffering an acute MI. Interval history----> as noted above she has visited with the cardiology nurse practitioner area no new changes were made. She remains on aspirin therapy. She denies any new problems with angina or anginal equivalents. She specifically denies any chest pain or tightness, shortness of breath, orthopnea, edema. She denies any dizziness or syncope. She is scheduled for a stress test 11/29/2018. She does not like taking ASA. Osteopenia/Osteoporosis-- her last bone density test was 8/9/2017. That test was consistent with osteoporosis and osteopenia. She refuses additional therapy. She will be due for DEXA 8/2019. She is taking a Vit D with Vitamin K. Past Medical History:   Diagnosis Date    Allergic rhinitis     Arthropathy of cervical facet joint     CAD (coronary artery disease) 7/12/2012    DDD (degenerative disc disease), cervical     Diverticulosis 10/21/2010    Fatty liver     Foraminal stenosis of cervical region     GERD (gastroesophageal reflux disease)     Hepatomegaly     Hyperlipidemia     Hypertension     Impaired fasting glucose     Kidney stones     [facility] [address] 7/12/2012    Movement disorder     Obesity     Osteopenia     Osteoporosis     Right carpal tunnel syndrome 05/02/2017    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 OBJECTIVE:  /82   Pulse 88   Resp 12   Ht 5' 2\" (1.575 m)   Wt 193 lb (87.5 kg)   BMI 35.30 kg/m²   Wt Readings from Last 3 Encounters:   11/26/18 193 lb (87.5 kg)   11/14/18 189 lb (85.7 kg)   04/03/18 184 lb (83.5 kg)     BP Readings from Last 3 Encounters:   11/26/18 132/82   11/14/18 110/70   04/03/18 110/70       GEN: NAD, A&O, Non-toxic  HEENT: NC/AT, CHRISTIANE, EOMI, Anicteric.   Oral cavity Clear,

## 2018-11-29 ENCOUNTER — HOSPITAL ENCOUNTER (OUTPATIENT)
Dept: NON INVASIVE DIAGNOSTICS | Age: 78
Discharge: HOME | End: 2018-11-29
Payer: MEDICARE

## 2018-11-29 LAB
LV EF: 67 %
LVEF MODALITY: NORMAL

## 2018-11-29 PROCEDURE — 3430000000 HC RX DIAGNOSTIC RADIOPHARMACEUTICAL: Performed by: NURSE PRACTITIONER

## 2018-11-29 PROCEDURE — A9502 TC99M TETROFOSMIN: HCPCS | Performed by: NURSE PRACTITIONER

## 2018-11-29 PROCEDURE — 93017 CV STRESS TEST TRACING ONLY: CPT | Performed by: INTERNAL MEDICINE

## 2018-11-29 PROCEDURE — 6360000002 HC RX W HCPCS: Performed by: INTERNAL MEDICINE

## 2018-11-29 PROCEDURE — 78452 HT MUSCLE IMAGE SPECT MULT: CPT

## 2018-11-29 RX ORDER — AMINOPHYLLINE DIHYDRATE 25 MG/ML
100 INJECTION, SOLUTION INTRAVENOUS ONCE
Status: COMPLETED | OUTPATIENT
Start: 2018-11-29 | End: 2018-11-29

## 2018-11-29 RX ADMIN — REGADENOSON 0.4 MG: 0.08 INJECTION, SOLUTION INTRAVENOUS at 14:18

## 2018-11-29 RX ADMIN — TETROFOSMIN 30 MILLICURIE: 0.23 INJECTION, POWDER, LYOPHILIZED, FOR SOLUTION INTRAVENOUS at 14:18

## 2018-11-29 RX ADMIN — TETROFOSMIN 10 MILLICURIE: 0.23 INJECTION, POWDER, LYOPHILIZED, FOR SOLUTION INTRAVENOUS at 13:01

## 2018-11-29 RX ADMIN — AMINOPHYLLINE 100 MG: 25 INJECTION, SOLUTION INTRAVENOUS at 14:19

## 2019-01-02 ENCOUNTER — HOSPITAL ENCOUNTER (OUTPATIENT)
Dept: WOMENS IMAGING | Age: 79
Discharge: HOME | End: 2019-01-02
Payer: MEDICARE

## 2019-01-02 DIAGNOSIS — Z12.39 BREAST CANCER SCREENING: ICD-10-CM

## 2019-01-02 PROCEDURE — 77063 BREAST TOMOSYNTHESIS BI: CPT

## 2019-02-06 RX ORDER — AMLODIPINE BESYLATE 5 MG/1
TABLET ORAL
Qty: 180 TABLET | Refills: 0 | Status: SHIPPED | OUTPATIENT
Start: 2019-02-06 | End: 2019-05-29 | Stop reason: SDUPTHER

## 2019-05-02 ENCOUNTER — ANESTHESIA EVENT (OUTPATIENT)
Dept: ENDOSCOPY | Age: 79
End: 2019-05-02
Payer: MEDICARE

## 2019-05-02 NOTE — PROGRESS NOTES
Name_______________________________________Printed:____________________  Date and time of surgery_5/20 1030_______________________Arrival Time:__0900______________   1. Do not eat or drink anything after 12 midnight (or____hours) prior to surgery. This includes no water, chewing gum or mints. Endoscopy patients follow your doctors bowel prep instructions,which may include taking part of prep after midnight. 2. Take the following pills with a small sip of water on the morning of surgery__norvasc_________________________________________________                  Do not take blood pressure medications ending in pril or sartan the emigdio prior to surgery or the morning of surgery_   3. Aspirin, Ibuprofen, Advil, Naproxen, Vitamin E and other Anti-inflammatory products should be stopped for 5 days before surgery or as directed by your physician. 4. Check with your Doctor regarding stopping Plavix, Coumadin,Eliquis, Lovenox,Effient,Pradaxa,Xarelto, Fragmin or other blood thinners and follow their instructions. 5. Do not smoke, and do not drink any alcoholic beverages 24 hours prior to surgery. This includes NA Beer. Refrain from the usage of any recreational drugs. 6. You may brush your teeth and gargle the morning of surgery. DO NOT SWALLOW WATER   7. You MUST make arrangements for a responsible adult to stay on site while you are here and take you home after your surgery. You will not be allowed to leave alone or drive yourself home. It is strongly suggested someone stay with you the first 24 hrs. Your surgery will be cancelled if you do not have a ride home. 8. A parent/legal guardian must accompany a child scheduled for surgery and plan to stay at the hospital until the child is discharged. Please do not bring other children with you.    9. Please wear simple, loose fitting clothing to the hospital.  Rosalba Arvind not bring valuables (money, credit cards, checkbooks, etc.) Do not wear any makeup (including no eye makeup) or nail polish on your fingers or toes. 10. DO NOT wear any jewelry or piercings on day of surgery. All body piercing jewelry must be removed. 11. If you have ___dentures, they will be removed before going to the OR; we will provide you a container. If you wear ___contact lenses or ___glasses, they will be removed; please bring a case for them. 12. Please see your family doctor/pediatrician for a history & physical and/or concerning medications. Bring any test results/reports from your physician's office. PCP__________________Phone___________H&P Appt. Date________             13 If you  have a Living Will and Durable Power of  for Healthcare, please bring in a copy. 15. Notify your Surgeon if you develop any illness between now and surgery  time, cough, cold, fever, sore throat, nausea, vomiting, etc.  Please notify your surgeon if you experience dizziness, shortness of breath or blurred vision between now & the time of your surgery             15. DO NOT shave your operative site 96 hours prior to surgery. For face & neck surgery, men may use an electric razor 48 hours prior to surgery. 16. Shower the night before surgery with ___Antibacterial soap ___Hibiclens             17. To provide excellent care visitors will be limited to one in the room at any given time. 18.  Please bring picture ID and insurance card. 19.  Visit our web site for additional information:  [email or web address]              20.During flu season no children under the age of 15 are permitted in the hospital for the safety of all patients.                               21. If you take a long acting insulin in the evening only  take half of your usual  dose the night  before your procedure              22. If you use a c-pap please bring DOS if staying overnight,             23.For your convenience [id number] [facility] has a pharmacy on site to fill your

## 2019-05-16 ENCOUNTER — TELEPHONE (OUTPATIENT)
Dept: INTERNAL MEDICINE CLINIC | Age: 79
End: 2019-05-16

## 2019-05-20 ENCOUNTER — HOSPITAL ENCOUNTER (OUTPATIENT)
Age: 79
Setting detail: OUTPATIENT SURGERY
Discharge: HOME | End: 2019-05-20
Attending: INTERNAL MEDICINE | Admitting: INTERNAL MEDICINE
Payer: MEDICARE

## 2019-05-20 ENCOUNTER — ANESTHESIA (OUTPATIENT)
Dept: ENDOSCOPY | Age: 79
End: 2019-05-20
Payer: MEDICARE

## 2019-05-20 VITALS
HEART RATE: 69 BPM | RESPIRATION RATE: 14 BRPM | OXYGEN SATURATION: 99 % | BODY MASS INDEX: 34.96 KG/M2 | HEIGHT: 62 IN | DIASTOLIC BLOOD PRESSURE: 75 MMHG | TEMPERATURE: 97.6 F | SYSTOLIC BLOOD PRESSURE: 127 MMHG | WEIGHT: 190 LBS

## 2019-05-20 VITALS
RESPIRATION RATE: 12 BRPM | DIASTOLIC BLOOD PRESSURE: 72 MMHG | SYSTOLIC BLOOD PRESSURE: 129 MMHG | OXYGEN SATURATION: 91 %

## 2019-05-20 PROCEDURE — 3609010600 HC COLONOSCOPY POLYPECTOMY SNARE/COLD BIOPSY: Performed by: INTERNAL MEDICINE

## 2019-05-20 PROCEDURE — 3700000001 HC ADD 15 MINUTES (ANESTHESIA): Performed by: INTERNAL MEDICINE

## 2019-05-20 PROCEDURE — 6360000002 HC RX W HCPCS: Performed by: NURSE ANESTHETIST, CERTIFIED REGISTERED

## 2019-05-20 PROCEDURE — 3609010300 HC COLONOSCOPY W/BIOPSY SINGLE/MULTIPLE: Performed by: INTERNAL MEDICINE

## 2019-05-20 PROCEDURE — 2709999900 HC NON-CHARGEABLE SUPPLY: Performed by: INTERNAL MEDICINE

## 2019-05-20 PROCEDURE — 2580000003 HC RX 258: Performed by: NURSE ANESTHETIST, CERTIFIED REGISTERED

## 2019-05-20 PROCEDURE — 7100000011 HC PHASE II RECOVERY - ADDTL 15 MIN: Performed by: INTERNAL MEDICINE

## 2019-05-20 PROCEDURE — 88305 TISSUE EXAM BY PATHOLOGIST: CPT

## 2019-05-20 PROCEDURE — 2500000003 HC RX 250 WO HCPCS: Performed by: NURSE ANESTHETIST, CERTIFIED REGISTERED

## 2019-05-20 PROCEDURE — 3700000000 HC ANESTHESIA ATTENDED CARE: Performed by: INTERNAL MEDICINE

## 2019-05-20 PROCEDURE — 2580000003 HC RX 258: Performed by: ANESTHESIOLOGY

## 2019-05-20 PROCEDURE — 7100000010 HC PHASE II RECOVERY - FIRST 15 MIN: Performed by: INTERNAL MEDICINE

## 2019-05-20 RX ORDER — SODIUM CHLORIDE 9 MG/ML
INJECTION, SOLUTION INTRAVENOUS CONTINUOUS
Status: DISCONTINUED | OUTPATIENT
Start: 2019-05-20 | End: 2019-05-20 | Stop reason: HOSPADM

## 2019-05-20 RX ORDER — PROPOFOL 10 MG/ML
INJECTION, EMULSION INTRAVENOUS PRN
Status: DISCONTINUED | OUTPATIENT
Start: 2019-05-20 | End: 2019-05-20 | Stop reason: SDUPTHER

## 2019-05-20 RX ORDER — LIDOCAINE HYDROCHLORIDE 20 MG/ML
INJECTION, SOLUTION EPIDURAL; INFILTRATION; INTRACAUDAL; PERINEURAL PRN
Status: DISCONTINUED | OUTPATIENT
Start: 2019-05-20 | End: 2019-05-20 | Stop reason: SDUPTHER

## 2019-05-20 RX ORDER — SODIUM CHLORIDE 9 MG/ML
INJECTION, SOLUTION INTRAVENOUS CONTINUOUS PRN
Status: DISCONTINUED | OUTPATIENT
Start: 2019-05-20 | End: 2019-05-20 | Stop reason: SDUPTHER

## 2019-05-20 RX ADMIN — PROPOFOL 20 MG: 10 INJECTION, EMULSION INTRAVENOUS at 10:50

## 2019-05-20 RX ADMIN — PROPOFOL 20 MG: 10 INJECTION, EMULSION INTRAVENOUS at 10:56

## 2019-05-20 RX ADMIN — PROPOFOL 20 MG: 10 INJECTION, EMULSION INTRAVENOUS at 10:59

## 2019-05-20 RX ADMIN — PROPOFOL 80 MG: 10 INJECTION, EMULSION INTRAVENOUS at 10:48

## 2019-05-20 RX ADMIN — LIDOCAINE HYDROCHLORIDE 30 MG: 20 INJECTION, SOLUTION EPIDURAL; INFILTRATION; INTRACAUDAL; PERINEURAL at 10:48

## 2019-05-20 RX ADMIN — SODIUM CHLORIDE: 9 INJECTION, SOLUTION INTRAVENOUS at 10:34

## 2019-05-20 RX ADMIN — PROPOFOL 20 MG: 10 INJECTION, EMULSION INTRAVENOUS at 10:52

## 2019-05-20 RX ADMIN — PROPOFOL 20 MG: 10 INJECTION, EMULSION INTRAVENOUS at 11:03

## 2019-05-20 ASSESSMENT — PAIN SCALES - GENERAL
PAINLEVEL_OUTOF10: 0

## 2019-05-20 ASSESSMENT — PAIN - FUNCTIONAL ASSESSMENT: PAIN_FUNCTIONAL_ASSESSMENT: 0-10

## 2019-05-20 ASSESSMENT — LIFESTYLE VARIABLES: SMOKING_STATUS: 0

## 2019-05-20 NOTE — ANESTHESIA PRE PROCEDURE
Department of Anesthesiology  Preprocedure Note       Name:  Luis Fernando Andersen   Age:  78 y.o.  :  1940                                          MRN:  [id number]         Date:  2019      Surgeon: Janessa Reza):  Ericka Lockett MD    Procedure: COLONOSCOPY (N/A )    Medications prior to admission:   Prior to Admission medications    Medication Sig Start Date End Date Taking? Authorizing Provider   amLODIPine (NORVASC) 5 MG tablet TAKE 1 TABLET BY MOUTH TWICE DAILY 18  Yes Kev Villafana DO   loratadine (CLARITIN) 10 MG tablet Take 10 mg by mouth daily   Yes Historical Provider, MD   amLODIPine (NORVASC) 5 MG tablet TAKE 1 TABLET BY MOUTH TWICE DAILY 19   Kev Villafana DO   magnesium 30 MG tablet Take 30 mg by mouth 2 times daily    Historical Provider, MD   Coenzyme Q10 (COQ10) 100 MG CAPS Take by mouth    Historical Provider, MD   Vitamin D-Vitamin K (K2 PLUS D3 PO) Take by mouth    Historical Provider, MD   Cyanocobalamin (VITAMIN B-12) 1000 MCG extended release tablet Take 1,000 mcg by mouth daily    Historical Provider, MD   Multiple Vitamins-Minerals (MULTIVITAMIN ADULT PO) Take by mouth    Historical Provider, MD   Cranberry 125 MG TABS Take by mouth    Historical Provider, MD   aspirin EC 81 MG EC tablet Take 1 tablet by mouth daily. 14   YVETTE Foy CNP   Phytosterol Esters (CHOLEST CARE PO) Take  by mouth. Historical Provider, MD   nitroGLYCERIN (NITROSTAT) 0.4 MG SL tablet Place 1 tablet under the tongue every 5 minutes as needed for Chest pain. 12   YVETTE Foy CNP       Current medications:    Current Facility-Administered Medications   Medication Dose Route Frequency Provider Last Rate Last Dose    0.9 % sodium chloride infusion   Intravenous Continuous Shailesh Mcclelland MD           Allergies:     Allergies   Allergen Reactions    Latex      welt    Antihistamine [Diphenhydramine Hcl] Shortness Of Breath and Rash    Decongest Shortness Of Breath and Rash    Penicillins Anaphylaxis     Not sure    Biaxin [Clarithromycin] Hives    Iodine Swelling     Swelling of throat    Rhinocort [Budesonide]     Sulfa Antibiotics     Codeine Nausea And Vomiting    Cortisone Rash       Problem List:    Patient Active Problem List   Diagnosis Code    Impaired fasting blood sugar R73.01    Dyslipidemia E78.5    Benign essential HTN I10    CAD in native artery I25.10    Osteopenia M85.80    DDD (degenerative disc disease), cervical M50.30    Arthropathy of cervical facet joint M47.812    Foraminal stenosis of cervical region M99.81    Statin intolerance Z78.9    Medically noncompliant Z91.19       Past Medical History:        Diagnosis Date    Allergic rhinitis     Anesthesia slow to wake    Arthropathy of cervical facet joint     CAD (coronary artery disease) 7/12/2012    DDD (degenerative disc disease), cervical     Diverticulosis 10/21/2010    Fatty liver     Foraminal stenosis of cervical region     GERD (gastroesophageal reflux disease)     Hepatomegaly     Hyperlipidemia     Hypertension     Impaired fasting glucose     Kidney stones     MI (my[facility] infarction) ([facility] [address] 7/12/2012    Movement disorder     Obesity     Osteopenia     Osteoporosis     Right carpal tunnel syndrome 05/02/2017    Ulnar neuropathy at elbow of right upper extremity 05/02/2017       Past Surgical History:        Procedure Laterality Date    APPENDECTOMY      CHOLECYSTECTOMY  1966    COLONOSCOPY  10/21/2010    COLONOSCOPY  12/11/2014    Dr. Cecilio Kim      bilateral catarcts removed    FRACTURE SURGERY  10/2011    left wrist    HYSTERECTOMY  1976    total    OVARY REMOVAL  04/1983    PTCA  7/13/2012    3 stents, kissing stents in LAD and stent in the diagonall, Dr. Yari Tineo  8/2011    UPPER GASTROINTESTINAL ENDOSCOPY  12/11/2015    Dr. Celena Camacho 9/27/2011    ORIF left distal radius fracture       Social History:    Social History     Tobacco Use    Smoking status: Never Smoker    Smokeless tobacco: Never Used   Substance Use Topics    Alcohol use: Yes     Comment: rare/once yearly                                Counseling given: Not Answered      Vital Signs (Current):   Vitals:    05/02/19 1115   Weight: 190 lb (86.2 kg)   Height: 5' 2\" (1.575 m)                                              BP Readings from Last 3 Encounters:   11/26/18 132/82   11/14/18 110/70   04/03/18 110/70       NPO Status:                                                                                 BMI:   Wt Readings from Last 3 Encounters:   05/02/19 190 lb (86.2 kg)   11/26/18 193 lb (87.5 kg)   11/14/18 189 lb (85.7 kg)     Body mass index is 34.75 kg/m². CBC:   Lab Results   Component Value Date    WBC 12.3 03/19/2018    RBC 4.88 03/19/2018    HGB 15.5 03/19/2018    HCT 45.4 03/19/2018    MCV 93.0 03/19/2018    RDW 13.4 03/19/2018     03/19/2018       CMP:   Lab Results   Component Value Date     03/19/2018    K 4.3 03/19/2018     03/19/2018    CO2 26 03/19/2018    BUN 19 03/19/2018    CREATININE 0.5 03/19/2018    GFRAA >60 03/19/2018    GFRAA >60 04/09/2013    AGRATIO 1.5 03/19/2018    LABGLOM >60 03/19/2018    GLUCOSE 99 03/19/2018    PROT 7.2 03/19/2018    PROT 7.2 11/29/2012    CALCIUM 8.8 03/19/2018    BILITOT 0.8 03/19/2018    ALKPHOS 77 03/19/2018    AST 26 03/19/2018    ALT 28 03/19/2018       POC Tests: No results for input(s): POCGLU, POCNA, POCK, POCCL, POCBUN, POCHEMO, POCHCT in the last 72 hours.     Coags:   Lab Results   Component Value Date    PROTIME 12.2 07/13/2012    INR 1.07 07/13/2012       HCG (If Applicable): No results found for: PREGTESTUR, PREGSERUM, HCG, HCGQUANT     ABGs: No results found for: PHART, PO2ART, PMB3NJS, JUG5HOI, BEART, Q4HKPEEN     Type & Screen (If Applicable):  No results found for: LABABO, [address]    Anesthesia Evaluation  Patient summary reviewed and Nursing notes reviewed history of anesthetic complications (prolonged emergence): Airway: Mallampati: III  TM distance: >3 FB   Neck ROM: full  Mouth opening: > = 3 FB Dental: normal exam         Pulmonary:Negative Pulmonary ROS and normal exam  breath sounds clear to auscultation      (-) COPD, asthma, sleep apnea and not a current smoker                           Cardiovascular:    (+) hypertension:, past MI (AMI 7/12):, CAD (neg stress test 2018, no recent issues, symptoms, or NTG use, follows cards regularly):, CABG/stent (PCI 7/12):, hyperlipidemia    (-) dysrhythmias,  angina and  CHF    ECG reviewed  Rhythm: regular  Rate: normal    Stress test reviewed       Beta Blocker:  Dose within 24 Hrs         Neuro/Psych:   (+) neuromuscular disease (cervical DDD, good ROM):,    (-) seizures, TIA and CVA           GI/Hepatic/Renal:   (+) GERD: well controlled, liver disease (fatty liver):,      (-) no renal disease       Endo/Other:        (-) diabetes mellitus (IFG), hypothyroidism, hyperthyroidism               Abdominal:   (+) obese,         Vascular:                                        Anesthesia Plan      MAC     ASA 3       Induction: intravenous. Anesthetic plan and risks discussed with patient. Plan discussed with CRNA.                   Bob Thibodeaux MD   5/20/2019

## 2019-05-20 NOTE — PROGRESS NOTES
Name:  Grace Smith  Age:  78 y.o.  CSN:  [id number]            Past Medical History:        Diagnosis Date    Allergic rhinitis     Anesthesia slow to wake    Arthropathy of cervical facet joint     CAD (coronary artery disease) 7/12/2012    DDD (degenerative disc disease), cervical     Diverticulosis 10/21/2010    Fatty liver     Foraminal stenosis of cervical region     GERD (gastroesophageal reflux disease)     Hepatomegaly     Hyperlipidemia     Hypertension     Impaired fasting glucose     Kidney stones     MI [facility]my[facility] infarction) ([facility] [address] 7/12/2012    Movement disorder     Obesity     Osteopenia     Osteoporosis     Right carpal tunnel syndrome 05/02/2017    Ulnar neuropathy at elbow of right upper extremity 05/02/2017       Past Surgical History:      Procedure Laterality Date    APPENDECTOMY      CHOLECYSTECTOMY  1966    COLONOSCOPY  10/21/2010    COLONOSCOPY  12/11/2014    Dr. Rylan Mckeon      bilateral catarcts removed    FRACTURE SURGERY  10/2011    left wrist    HYSTERECTOMY  1976    total    OVARY REMOVAL  04/1983    PTCA  7/13/2012    3 stents, kissing stents in LAD and stent in the diagonall, Dr. Sundra Dakins  8/2011    UPPER GASTROINTESTINAL ENDOSCOPY  12/11/2015    Dr. Carroll Ribeiro  9/27/2011    ORIF left distal radius fracture       Medications Prior to Admission:  Medications Prior to Admission: amLODIPine (NORVASC) 5 MG tablet, TAKE 1 TABLET BY MOUTH TWICE DAILY  loratadine (CLARITIN) 10 MG tablet, Take 10 mg by mouth daily  amLODIPine (NORVASC) 5 MG tablet, TAKE 1 TABLET BY MOUTH TWICE DAILY  magnesium 30 MG tablet, Take 30 mg by mouth 2 times daily  Coenzyme Q10 (COQ10) 100 MG CAPS, Take by mouth  Vitamin D-Vitamin K (K2 PLUS D3 PO), Take by mouth  Cyanocobalamin (VITAMIN B-12) 1000 MCG extended release tablet, Take 1,000 mcg by mouth daily  Multiple Vitamins-Minerals (MULTIVITAMIN  Rheum Arthritis Mother     Alcohol Abuse Father     Breast Cancer Maternal Grandmother        Vital Signs: There were no vitals filed for this visit.

## 2019-05-20 NOTE — ANESTHESIA POSTPROCEDURE EVALUATION
Department of Anesthesiology  Postprocedure Note    Patient: Dustin Mcclendon  MRN: [id number]  YOB: 1940  Date of evaluation: 5/20/2019  Time:  11:30 AM     Procedure Summary     Date:  05/20/19 Room / Location:  [facility] ENDO 01 / [facility] ENDOSCOPY    Anesthesia Start:  5880 Anesthesia Stop:  1113    Procedures:       COLONOSCOPY WITH BIOPSY (N/A )      COLONOSCOPY POLYPECTOMY SNARE/COLD BIOPSY Diagnosis:  (Z12.11 - COLON CANCER SCREENING)    Surgeon:  Sam Ridley MD Responsible Provider:  Bob Thibodeaux MD    Anesthesia Type:  MAC ASA Status:  3          Anesthesia Type: MAC    Cristal Phase I: Cristal Score: 10    Cristal Phase II: Cristal Score: 10    Last vitals: Reviewed and per EMR flowsheets.        Anesthesia Post Evaluation    Patient location during evaluation: PACU  Patient participation: complete - patient participated  Level of consciousness: awake and alert  Airway patency: patent  Nausea & Vomiting: no vomiting and no nausea  Complications: no  Cardiovascular status: hemodynamically stable  Respiratory status: acceptable  Hydration status: stable

## 2019-05-29 ENCOUNTER — OFFICE VISIT (OUTPATIENT)
Dept: INTERNAL MEDICINE CLINIC | Age: 79
End: 2019-05-29
Payer: MEDICARE

## 2019-05-29 VITALS
RESPIRATION RATE: 12 BRPM | SYSTOLIC BLOOD PRESSURE: 120 MMHG | HEIGHT: 63 IN | BODY MASS INDEX: 33.66 KG/M2 | DIASTOLIC BLOOD PRESSURE: 72 MMHG | WEIGHT: 190 LBS

## 2019-05-29 DIAGNOSIS — R73.01 IMPAIRED FASTING BLOOD SUGAR: ICD-10-CM

## 2019-05-29 DIAGNOSIS — I10 BENIGN ESSENTIAL HTN: Primary | ICD-10-CM

## 2019-05-29 DIAGNOSIS — I25.10 CAD IN NATIVE ARTERY: ICD-10-CM

## 2019-05-29 DIAGNOSIS — H61.23 BILATERAL IMPACTED CERUMEN: ICD-10-CM

## 2019-05-29 DIAGNOSIS — C91.10 CLL (CHRONIC LYMPHOCYTIC LEUKEMIA) (HCC): ICD-10-CM

## 2019-05-29 DIAGNOSIS — Z91.018 FOOD ALLERGY: ICD-10-CM

## 2019-05-29 DIAGNOSIS — E78.5 DYSLIPIDEMIA: ICD-10-CM

## 2019-05-29 DIAGNOSIS — K57.30 DIVERTICULOSIS OF LARGE INTESTINE WITHOUT HEMORRHAGE: ICD-10-CM

## 2019-05-29 DIAGNOSIS — Z23 NEED FOR TDAP VACCINATION: ICD-10-CM

## 2019-05-29 DIAGNOSIS — Z88.9 MULTIPLE DRUG ALLERGIES: ICD-10-CM

## 2019-05-29 PROCEDURE — G8599 NO ASA/ANTIPLAT THER USE RNG: HCPCS | Performed by: INTERNAL MEDICINE

## 2019-05-29 PROCEDURE — 1123F ACP DISCUSS/DSCN MKR DOCD: CPT | Performed by: INTERNAL MEDICINE

## 2019-05-29 PROCEDURE — 4040F PNEUMOC VAC/ADMIN/RCVD: CPT | Performed by: INTERNAL MEDICINE

## 2019-05-29 PROCEDURE — 69210 REMOVE IMPACTED EAR WAX UNI: CPT | Performed by: INTERNAL MEDICINE

## 2019-05-29 PROCEDURE — 99214 OFFICE O/P EST MOD 30 MIN: CPT | Performed by: INTERNAL MEDICINE

## 2019-05-29 PROCEDURE — G8427 DOCREV CUR MEDS BY ELIG CLIN: HCPCS | Performed by: INTERNAL MEDICINE

## 2019-05-29 PROCEDURE — G8399 PT W/DXA RESULTS DOCUMENT: HCPCS | Performed by: INTERNAL MEDICINE

## 2019-05-29 PROCEDURE — 1036F TOBACCO NON-USER: CPT | Performed by: INTERNAL MEDICINE

## 2019-05-29 PROCEDURE — G8417 CALC BMI ABV UP PARAM F/U: HCPCS | Performed by: INTERNAL MEDICINE

## 2019-05-29 PROCEDURE — 1090F PRES/ABSN URINE INCON ASSESS: CPT | Performed by: INTERNAL MEDICINE

## 2019-05-29 ASSESSMENT — PATIENT HEALTH QUESTIONNAIRE - PHQ9
SUM OF ALL RESPONSES TO PHQ QUESTIONS 1-9: 0
1. LITTLE INTEREST OR PLEASURE IN DOING THINGS: 0
SUM OF ALL RESPONSES TO PHQ9 QUESTIONS 1 & 2: 0
SUM OF ALL RESPONSES TO PHQ QUESTIONS 1-9: 0
2. FEELING DOWN, DEPRESSED OR HOPELESS: 0

## 2019-05-29 NOTE — PROGRESS NOTES
[facility]) Physicians  Internal Medicine  Patient Encounter  Andreia Schmidt D.O., [facility]        Chief Complaint   Patient presents with    Medication Check    Check-Up       HPI: 78 y.o. female seen today requesting a routine checkup regarding the status of her current chronic medical problems listed below along with a medication review. She recently had a colonoscopy on 5/20/2019.  2 polyps were removed. Mild diverticulosis was noted as well as internal hemorrhoids. She was given a 5 year recall. She continues to see Dr. Tripp Jimenez every 3 months for CLL. She has  Been told she is stable. She was also seen by Dr. Gill Coffey on 5/22/2019 for urogynecology. She underwent urodynamic testing and diagnosed with urge incontinence and OAB. The plan is to recommend anticholinergic therapy and pelvic floor PT. Also, she has additional complaints of allergies. She has multiple drug allergies and is concerned about food allergies. She thinks she is intolerable to wheat. She wants to see an allergist.      Also, she has additional complaints of decreased hearing out of both ears. She's having the this into the TV very loudly. HTN-- Patient states that she is still on the amlodipine 5 mg BID. She has a long history of medical noncompliance and manipulating her medications as she sees fit. She's been doing well with the amlodipine. She denies any lightheadedness, headaches, syncope. IFG--Patient had lab ordered provided in November. She did not get her lab work completed. She is overdue for lab. She denies any excessive thirst.  She does have frequent urination. She denies any weight changes. Lab Results   Component Value Date    LABA1C 5.0 03/19/2018     Lab Results   Component Value Date    EAG 96.8 03/19/2018      Hyperlipidemia:   Lab Results   Component Value Date    LDLCALC 85 03/19/2018   Remains on over-the-counter phytosterols.   She refuses to explore statin therapy. CAD-- Previous history---->  She has H/O PTCA with three stents 7/30/2012 after suffering an acute MI. Interval history----> Patient states she was off aspirin for a week or 2. She denies any chest pain at rest or with exertion. She denies any orthopnea, edema, dizziness, syncope, palpitations. Her last stress Myoview 11/29/2019--   Summary    Tiny fixed anteroapical defect related to fibrosis or soft tissue    attenuation. No ischemia.    Normal LV function.    Overall findings represent a low risk scan. Past Medical History:   Diagnosis Date    Allergic rhinitis     Anesthesia slow to wake    Arthropathy of cervical facet joint     CAD (coronary artery disease) 7/12/2012    DDD (degenerative disc disease), cervical     Diverticulosis 10/21/2010    Fatty liver     Foraminal stenosis of cervical region     GERD (gastroesophageal reflux disease)     Hepatomegaly     Hyperlipidemia     Hypertension     Impaired fasting glucose     Kidney stones     MI (myocardial infarction) ([facility] [address] 7/12/2012    Movement disorder     Obesity     Osteopenia     Osteoporosis     Right carpal tunnel syndrome 05/02/2017    Ulnar neuropathy at elbow of right upper extremity 05/02/2017    Urge incontinence        Review of Systems - As per HPI  :  She has urinary incontinence and stool incontinence. OBJECTIVE:  /72   Resp 12   Ht 5' 3\" (1.6 m)   Wt 190 lb (86.2 kg)   BMI 33.66 kg/m²   Wt Readings from Last 3 Encounters:   05/29/19 190 lb (86.2 kg)   05/20/19 190 lb (86.2 kg)   11/26/18 193 lb (87.5 kg)     BP Readings from Last 3 Encounters:   05/29/19 120/72   05/20/19 129/72   05/20/19 127/75       GEN: NAD, A&O, Non-toxic  HEENT: NC/AT, CHRISTIANE, EOMI, Anicteric. Oral cavity clear,  throat normal.  Tongue midline. AU-- Impacted cerumen. NECK: Supple. No thyromegaly or nodules. No JVD. No soft tissue masses. LYMPH: No C/SC nodes. CV:  Reg rhythm. Rate NL.   No ectopy. No murmur. GI: Abd soft, NT, No masses. No hepatosplenomegaly. PULM: CTA  EXT: No edema   VASC:  No Carotid bruits. Pedal pulses 2+. SKIN: No rashes or lesions of concern  NEURO:  No focal deficits. Procedure:  Cerumen removed from both external auditory canals utilizing curette and irrigation. Otoscopy used for magnification----> TMs normal      ASSESSMENT/PLAN:    1. Benign essential HTN  Blood pressure is well controlled  Continue the amlodipine 5 mg twice a day  Continue to monitor for edema and hypotension    2. Diverticulosis of large intestine without hemorrhage  Patient counseled    3. Need for Tdap vaccination    - Tetanus-Diphth-Acell Pertussis ([address]) 5-2.5-18.5 LF-MCG/0.5 injection; Inject 0.5 mLs into the muscle once for 1 dose  Dispense: 1 vial; Refill: 0    4. Food allergy  This has not been documented or tested. Patient is requesting referral to allergist  - Darlene Viveros MD, Allergy & Immunology, [facility]    5. Multiple drug allergies    - AFL - Erna Spaulding MD, Allergy & Immunology, [facility]    6. Impaired fasting blood sugar  Condition stability is uncertain. Due for lab  Check A1c  Continue lifestyle approach with lower carb eating    7. Dyslipidemia  Condition is uncontrolled. Continue lifestyle approach with low-fat diet, portion controlled diet  Increase physical activity    8. CAD in native artery  Condition is stable with a recent stress test in November. This showed no reversible ischemia. Continue cardiovascular risk reduction. Patient does not want to explore statin therapy. She's been intolerant to statins in the past      9. Bilateral impacted cerumen  Cerumen removal    10. CLL  Follow-up with oncology        Advised patient follow-up with her gastroenterologist with her bowel problems and the urologist for her urinary incontinence. She does not wish to try any additional oral medications.

## 2019-05-29 NOTE — PATIENT INSTRUCTIONS
Patient Education        Learning About Diverticulosis and Diverticulitis  What are diverticulosis and diverticulitis? In diverticulosis and diverticulitis, pouches called diverticula form in the wall of the large intestine, or colon. · In diverticulosis, the pouches do not cause any pain or other symptoms. · In diverticulitis, the pouches get inflamed or infected and cause symptoms. Doctors aren't sure what causes these pouches in the colon. But they think that a low-fiber diet may play a role. Without fiber to add bulk to the stool, the colon has to work harder than normal to push the stool forward. The pressure from this may cause pouches to form in weak spots along the colon. Some people with diverticulosis get diverticulitis. But experts don't know why this happens. What are the symptoms? · In diverticulosis, most people don't have symptoms. But pouches sometimes bleed. · In diverticulitis, symptoms may last from a few hours to a week or more. They include:  ? Belly pain. This is usually in the lower left side. It is sometimes worse when you move. This is the most common symptom. ? Fever and chills. ? Bloating and gas. ? Diarrhea or constipation. ? Nausea and sometimes vomiting.  ? Not feeling like eating. How can you prevent these problems? You may be able to lower your chance of getting diverticulitis. You can do this by taking steps to prevent constipation. · Eat fruits, vegetables, beans, and whole grains every day. These foods are high in fiber. · Drink plenty of fluids (enough so that your urine is light yellow or clear like water). If you have kidney, heart, or liver disease and have to limit fluids, talk with your doctor before you increase the amount of fluids you drink. · Get at least 30 minutes of exercise on most days of the week. Walking is a good choice. You also may want to do other activities, such as running, swimming, cycling, or playing tennis or team sports.   · Take a fiber supplement, such as Citrucel or Metamucil, every day if needed. Read and follow all instructions on the label. · Schedule time each day for a bowel movement. Having a daily routine may help. Take your time and do not strain when having a bowel movement. Some people avoid nuts, seeds, berries, and popcorn. They believe that these foods might get trapped in the diverticula and cause pain. But there is no proof that these foods cause diverticulitis or make it worse. How are these problems treated? · The best way to treat diverticulosis is to avoid constipation. (See the tips above.)  · Treatment for diverticulitis includes antibiotics and often a change in your diet. You may need only liquids at first. Your doctor may suggest pain medicines for pain or belly cramps. In some cases, surgery may be needed. Follow-up care is a key part of your treatment and safety. Be sure to make and go to all appointments, and call your doctor if you are having problems. It's also a good idea to know your test results and keep a list of the medicines you take. Where can you learn more? Go to https://ESBATech.Flickme. org and sign in to your [email or web address] account. Enter E628 in the Qinti box to learn more about \"Learning About Diverticulosis and Diverticulitis. \"     If you do not have an account, please click on the \"Sign Up Now\" link. Current as of: November 7, 2018  Content Version: 12.0  © 8442-2118 [employer]. Care instructions adapted under license by [employer]. If you have questions about a medical condition or this instruction, always ask your healthcare professional. [address] any warranty or liability for your use of this information.

## 2019-05-30 RX ORDER — AMLODIPINE BESYLATE 5 MG/1
TABLET ORAL
Qty: 180 TABLET | Refills: 1 | Status: SHIPPED | OUTPATIENT
Start: 2019-05-30 | End: 2019-12-09 | Stop reason: SDUPTHER

## 2019-07-16 ENCOUNTER — HOSPITAL ENCOUNTER (OUTPATIENT)
Age: 79
Discharge: HOME | End: 2019-07-16
Payer: MEDICARE

## 2019-07-16 PROCEDURE — 86003 ALLG SPEC IGE CRUDE XTRC EA: CPT

## 2019-07-20 LAB
ALLERGEN PORK: <0.1 KU/L
ALLERGEN SEE NOTE: NORMAL
ALLERGEN SHRIMP IGE: 0.55 KU/L
ALLERGEN WHEAT IGE: <0.1 KU/L

## 2019-08-17 NOTE — TELEPHONE ENCOUNTER
Patient called to let Dr. Edi Ortega and Penny Roper know that yesterday she saw Dr. Ray Lopez at [facility] who is a GynoUrologist.  Monday she is seeing Dr. Francis Tierney for a colonoscopy and YRO Dynammic test.  Any questions or concerns please call patient at number provided. The patient is a 24y Female complaining of abdominal pain.

## 2019-12-09 RX ORDER — AMLODIPINE BESYLATE 5 MG/1
TABLET ORAL
Qty: 180 TABLET | Refills: 0 | Status: SHIPPED | OUTPATIENT
Start: 2019-12-09 | End: 2019-12-11 | Stop reason: SDUPTHER

## 2019-12-11 ENCOUNTER — OFFICE VISIT (OUTPATIENT)
Dept: INTERNAL MEDICINE CLINIC | Age: 79
End: 2019-12-11
Payer: MEDICARE

## 2019-12-11 VITALS
WEIGHT: 186 LBS | HEART RATE: 88 BPM | DIASTOLIC BLOOD PRESSURE: 70 MMHG | RESPIRATION RATE: 12 BRPM | HEIGHT: 63 IN | BODY MASS INDEX: 32.96 KG/M2 | SYSTOLIC BLOOD PRESSURE: 120 MMHG

## 2019-12-11 DIAGNOSIS — R73.01 IMPAIRED FASTING BLOOD SUGAR: ICD-10-CM

## 2019-12-11 DIAGNOSIS — Z23 FLU VACCINE NEED: Primary | ICD-10-CM

## 2019-12-11 DIAGNOSIS — I25.10 CAD IN NATIVE ARTERY: ICD-10-CM

## 2019-12-11 DIAGNOSIS — M81.0 AGE-RELATED OSTEOPOROSIS WITHOUT CURRENT PATHOLOGICAL FRACTURE: ICD-10-CM

## 2019-12-11 DIAGNOSIS — Z13.6 SCREENING FOR CARDIOVASCULAR CONDITION: ICD-10-CM

## 2019-12-11 DIAGNOSIS — Z00.00 ROUTINE GENERAL MEDICAL EXAMINATION AT A HEALTH CARE FACILITY: ICD-10-CM

## 2019-12-11 DIAGNOSIS — I10 BENIGN ESSENTIAL HTN: ICD-10-CM

## 2019-12-11 DIAGNOSIS — M54.50 CHRONIC BILATERAL LOW BACK PAIN WITHOUT SCIATICA: ICD-10-CM

## 2019-12-11 DIAGNOSIS — G89.29 CHRONIC BILATERAL LOW BACK PAIN WITHOUT SCIATICA: ICD-10-CM

## 2019-12-11 PROCEDURE — G0008 ADMIN INFLUENZA VIRUS VAC: HCPCS | Performed by: INTERNAL MEDICINE

## 2019-12-11 PROCEDURE — 1123F ACP DISCUSS/DSCN MKR DOCD: CPT | Performed by: INTERNAL MEDICINE

## 2019-12-11 PROCEDURE — 4040F PNEUMOC VAC/ADMIN/RCVD: CPT | Performed by: INTERNAL MEDICINE

## 2019-12-11 PROCEDURE — G0446 INTENS BEHAVE THER CARDIO DX: HCPCS | Performed by: INTERNAL MEDICINE

## 2019-12-11 PROCEDURE — G8599 NO ASA/ANTIPLAT THER USE RNG: HCPCS | Performed by: INTERNAL MEDICINE

## 2019-12-11 PROCEDURE — 90653 IIV ADJUVANT VACCINE IM: CPT | Performed by: INTERNAL MEDICINE

## 2019-12-11 PROCEDURE — G0438 PPPS, INITIAL VISIT: HCPCS | Performed by: INTERNAL MEDICINE

## 2019-12-11 PROCEDURE — G8482 FLU IMMUNIZE ORDER/ADMIN: HCPCS | Performed by: INTERNAL MEDICINE

## 2019-12-11 RX ORDER — LEVOCETIRIZINE DIHYDROCHLORIDE 5 MG/1
5 TABLET, FILM COATED ORAL 2 TIMES DAILY
COMMUNITY
End: 2020-08-11 | Stop reason: ALTCHOICE

## 2019-12-11 ASSESSMENT — PATIENT HEALTH QUESTIONNAIRE - PHQ9
SUM OF ALL RESPONSES TO PHQ QUESTIONS 1-9: 0
SUM OF ALL RESPONSES TO PHQ QUESTIONS 1-9: 0

## 2019-12-31 ENCOUNTER — HOSPITAL ENCOUNTER (OUTPATIENT)
Dept: GENERAL RADIOLOGY | Age: 79
Discharge: HOME | End: 2019-12-31
Payer: MEDICARE

## 2019-12-31 PROCEDURE — 77080 DXA BONE DENSITY AXIAL: CPT

## 2019-12-31 PROCEDURE — 72110 X-RAY EXAM L-2 SPINE 4/>VWS: CPT

## 2020-01-07 ENCOUNTER — TELEPHONE (OUTPATIENT)
Dept: INTERNAL MEDICINE CLINIC | Age: 80
End: 2020-01-07

## 2020-01-07 NOTE — TELEPHONE ENCOUNTER
Patient is calling for most recent test results from [facility].  Please contact patient at the number provided to advise.

## 2020-03-19 RX ORDER — AMLODIPINE BESYLATE 5 MG/1
TABLET ORAL
Qty: 180 TABLET | Refills: 1 | Status: SHIPPED | OUTPATIENT
Start: 2020-03-19 | End: 2020-10-22

## 2020-07-20 ENCOUNTER — HOSPITAL ENCOUNTER (OUTPATIENT)
Dept: WOMENS IMAGING | Age: 80
Discharge: HOME | End: 2020-07-20
Payer: MEDICARE

## 2020-07-20 PROCEDURE — 77063 BREAST TOMOSYNTHESIS BI: CPT

## 2020-07-22 ENCOUNTER — TELEPHONE (OUTPATIENT)
Dept: INTERNAL MEDICINE CLINIC | Age: 80
End: 2020-07-22

## 2020-07-22 NOTE — TELEPHONE ENCOUNTER
Patient scheduled for appt. Thinks she will need blood work done.  Requesting order be placed to have drawn prior to that appt

## 2020-07-30 ENCOUNTER — HOSPITAL ENCOUNTER (OUTPATIENT)
Dept: ULTRASOUND IMAGING | Age: 80
Discharge: HOME | End: 2020-07-30
Payer: MEDICARE

## 2020-07-30 ENCOUNTER — HOSPITAL ENCOUNTER (OUTPATIENT)
Age: 80
Discharge: HOME | End: 2020-07-30
Payer: MEDICARE

## 2020-07-30 ENCOUNTER — HOSPITAL ENCOUNTER (OUTPATIENT)
Dept: WOMENS IMAGING | Age: 80
Discharge: HOME | End: 2020-07-30
Payer: MEDICARE

## 2020-07-30 LAB
A/G RATIO: 1.6 (ref 1.1–2.2)
ALBUMIN SERPL-MCNC: 4.4 G/DL (ref 3.4–5)
ALP BLD-CCNC: 94 U/L (ref 40–129)
ALT SERPL-CCNC: 72 U/L (ref 10–40)
ANION GAP SERPL CALCULATED.3IONS-SCNC: 14 MMOL/L (ref 3–16)
AST SERPL-CCNC: 54 U/L (ref 15–37)
BASOPHILS ABSOLUTE: 0.1 K/UL (ref 0–0.2)
BASOPHILS RELATIVE PERCENT: 0.7 %
BILIRUB SERPL-MCNC: 0.8 MG/DL (ref 0–1)
BUN BLDV-MCNC: 14 MG/DL (ref 7–20)
CALCIUM SERPL-MCNC: 9.1 MG/DL (ref 8.3–10.6)
CHLORIDE BLD-SCNC: 98 MMOL/L (ref 99–110)
CHOLESTEROL, TOTAL: 170 MG/DL (ref 0–199)
CO2: 25 MMOL/L (ref 21–32)
CREAT SERPL-MCNC: 0.5 MG/DL (ref 0.6–1.2)
EOSINOPHILS ABSOLUTE: 0.2 K/UL (ref 0–0.6)
EOSINOPHILS RELATIVE PERCENT: 1.8 %
GFR AFRICAN AMERICAN: >60
GFR NON-AFRICAN AMERICAN: >60
GLOBULIN: 2.8 G/DL
GLUCOSE BLD-MCNC: 96 MG/DL (ref 70–99)
HCT VFR BLD CALC: 47.5 % (ref 36–48)
HDLC SERPL-MCNC: 39 MG/DL (ref 40–60)
HEMATOLOGY PATH CONSULT: NO
HEMOGLOBIN: 15.7 G/DL (ref 12–16)
LDL CHOLESTEROL CALCULATED: 82 MG/DL
LYMPHOCYTES ABSOLUTE: 6 K/UL (ref 1–5.1)
LYMPHOCYTES RELATIVE PERCENT: 44.8 %
MCH RBC QN AUTO: 30.7 PG (ref 26–34)
MCHC RBC AUTO-ENTMCNC: 33 G/DL (ref 31–36)
MCV RBC AUTO: 93.1 FL (ref 80–100)
MONOCYTES ABSOLUTE: 0.8 K/UL (ref 0–1.3)
MONOCYTES RELATIVE PERCENT: 5.6 %
NEUTROPHILS ABSOLUTE: 6.3 K/UL (ref 1.7–7.7)
NEUTROPHILS RELATIVE PERCENT: 47.1 %
PDW BLD-RTO: 13.4 % (ref 12.4–15.4)
PLATELET # BLD: 267 K/UL (ref 135–450)
PMV BLD AUTO: 7.5 FL (ref 5–10.5)
POTASSIUM SERPL-SCNC: 4.5 MMOL/L (ref 3.5–5.1)
RBC # BLD: 5.1 M/UL (ref 4–5.2)
SODIUM BLD-SCNC: 137 MMOL/L (ref 136–145)
TOTAL PROTEIN: 7.2 G/DL (ref 6.4–8.2)
TRIGL SERPL-MCNC: 247 MG/DL (ref 0–150)
TSH SERPL DL<=0.05 MIU/L-ACNC: 3.23 UIU/ML (ref 0.27–4.2)
VLDLC SERPL CALC-MCNC: 49 MG/DL
WBC # BLD: 13.4 K/UL (ref 4–11)

## 2020-07-30 PROCEDURE — 83036 HEMOGLOBIN GLYCOSYLATED A1C: CPT

## 2020-07-30 PROCEDURE — G0279 TOMOSYNTHESIS, MAMMO: HCPCS

## 2020-07-30 PROCEDURE — 85025 COMPLETE CBC W/AUTO DIFF WBC: CPT

## 2020-07-30 PROCEDURE — 36415 COLL VENOUS BLD VENIPUNCTURE: CPT

## 2020-07-30 PROCEDURE — 84443 ASSAY THYROID STIM HORMONE: CPT

## 2020-07-30 PROCEDURE — 76642 ULTRASOUND BREAST LIMITED: CPT

## 2020-07-30 PROCEDURE — 80061 LIPID PANEL: CPT

## 2020-07-30 PROCEDURE — 80053 COMPREHEN METABOLIC PANEL: CPT

## 2020-07-31 LAB
ESTIMATED AVERAGE GLUCOSE: 108.3 MG/DL
HBA1C MFR BLD: 5.4 %

## 2020-08-11 ENCOUNTER — OFFICE VISIT (OUTPATIENT)
Dept: INTERNAL MEDICINE CLINIC | Age: 80
End: 2020-08-11
Payer: MEDICARE

## 2020-08-11 VITALS
TEMPERATURE: 97.7 F | RESPIRATION RATE: 16 BRPM | BODY MASS INDEX: 33.31 KG/M2 | DIASTOLIC BLOOD PRESSURE: 70 MMHG | HEART RATE: 98 BPM | WEIGHT: 188 LBS | SYSTOLIC BLOOD PRESSURE: 120 MMHG | OXYGEN SATURATION: 98 % | HEIGHT: 63 IN

## 2020-08-11 PROCEDURE — G8399 PT W/DXA RESULTS DOCUMENT: HCPCS | Performed by: INTERNAL MEDICINE

## 2020-08-11 PROCEDURE — 1090F PRES/ABSN URINE INCON ASSESS: CPT | Performed by: INTERNAL MEDICINE

## 2020-08-11 PROCEDURE — 99214 OFFICE O/P EST MOD 30 MIN: CPT | Performed by: INTERNAL MEDICINE

## 2020-08-11 PROCEDURE — 1123F ACP DISCUSS/DSCN MKR DOCD: CPT | Performed by: INTERNAL MEDICINE

## 2020-08-11 PROCEDURE — 4040F PNEUMOC VAC/ADMIN/RCVD: CPT | Performed by: INTERNAL MEDICINE

## 2020-08-11 PROCEDURE — G8417 CALC BMI ABV UP PARAM F/U: HCPCS | Performed by: INTERNAL MEDICINE

## 2020-08-11 PROCEDURE — 1036F TOBACCO NON-USER: CPT | Performed by: INTERNAL MEDICINE

## 2020-08-11 PROCEDURE — G8427 DOCREV CUR MEDS BY ELIG CLIN: HCPCS | Performed by: INTERNAL MEDICINE

## 2020-08-11 RX ORDER — LEVOCETIRIZINE DIHYDROCHLORIDE 5 MG/1
5 TABLET, FILM COATED ORAL NIGHTLY
COMMUNITY
End: 2021-10-26 | Stop reason: DRUGHIGH

## 2020-08-11 ASSESSMENT — PATIENT HEALTH QUESTIONNAIRE - PHQ9
2. FEELING DOWN, DEPRESSED OR HOPELESS: 0
SUM OF ALL RESPONSES TO PHQ QUESTIONS 1-9: 0
SUM OF ALL RESPONSES TO PHQ QUESTIONS 1-9: 0
1. LITTLE INTEREST OR PLEASURE IN DOING THINGS: 0
SUM OF ALL RESPONSES TO PHQ9 QUESTIONS 1 & 2: 0

## 2020-08-11 NOTE — PROGRESS NOTES
[facility]) Physicians  Internal Medicine  Patient Encounter  Patricio James D.O., [facility]        Chief Complaint   Patient presents with   Summer Smart       HPI: [de-identified] y.o. female seen today requesting a routine checkup regarding the status of her current chronic medical problems listed below along with a medication review. She has mammogram and then an US-- benign cyst.      CLL-- She is under the care of Dr. Memo Lawson. She denies swollen lymph nodes. Lab Results   Component Value Date    WBC 13.4 (H) 07/30/2020    HGB 15.7 07/30/2020    HCT 47.5 07/30/2020    MCV 93.1 07/30/2020     07/30/2020        HTN--   She denies any lightheadedness, headaches, syncope. She also denies any episodes of unilateral weakness or paresthesias. She does not add salt to foods. IFG--Patient denies any polys. Her last blood sugar was 96. Her A1c is normal.  She denies any weight changes. Lab Results   Component Value Date    LABA1C 5.4 07/30/2020     Lab Results   Component Value Date    .3 07/30/2020      Hyperlipidemia:   Lab Results   Component Value Date    [address] 07/30/2020   Despite her history of significant coronary artery disease, she has resisted taking statins. She continues to take over-the-counter flight of sterols. She understands the risks of not being on statin therapy. CAD-- Previous history---->  She has H/O PTCA with three stents 7/30/2012 after suffering an acute MI. Her last stress Myoview 11/29/2019--   Summary    Tiny fixed anteroapical defect related to fibrosis or soft tissue    attenuation. No ischemia.    Normal LV function.    Overall findings represent a low risk scan. Interval history----> Patient denies any exertional chest pain or chest pain at rest.  She denies any orthopnea, dyspnea with exertion, increased edema, palpitations, syncopal episodes.   Patient has not seen her cardiology specialist in almost 2 years    Elevated LFT-- She has slightly elevated :LFT's over the years. They were a little higher this last lab before. She will see Dr. Sandra Garrett. She wants additional tests while she waits for her appointment. She denies abd pain, jaundice. Past Medical History:   Diagnosis Date    Allergic rhinitis     Anesthesia slow to wake    Arthropathy of cervical facet joint     CAD (coronary artery disease) 7/12/2012    CLL (chronic lymphocytic leukemia) ([facility] 5/29/2019    DDD (degenerative disc disease), cervical     Diverticulosis 10/21/2010    Fatty liver     Foraminal stenosis of cervical region     GERD (gastroesophageal reflux disease)     Hepatomegaly     Hyperlipidemia     Hypertension     Impaired fasting glucose     Kidney stones     MI (myocardial infarction) ([facility] 7/12/2012    Movement disorder     Obesity     Osteopenia     Osteoporosis     Right carpal tunnel syndrome 05/02/2017    Ulnar neuropathy at elbow of right upper extremity 05/02/2017    Urge incontinence        Review of Systems - As per HPI  :  She has urinary incontinence and stool incontinence. OBJECTIVE:  /70   Pulse 98   Temp 97.7 °F (36.5 °C)   Resp 16   Ht 5' 3\" (1.6 m)   Wt 188 lb (85.3 kg)   SpO2 98%   BMI 33.30 kg/m²   Wt Readings from Last 3 Encounters:   08/11/20 188 lb (85.3 kg)   12/11/19 186 lb (84.4 kg)   05/29/19 190 lb (86.2 kg)     BP Readings from Last 3 Encounters:   08/11/20 120/70   12/11/19 120/70   05/29/19 120/72       GEN: NAD, A&O, Non-toxic  HEENT: NC/AT, CHRISTIANE, EOMI, Anicteric. Oral cavity clear,  throat normal.  Tongue midline. AU-- Impacted cerumen. NECK: Supple. No thyromegaly or nodules. No JVD. No soft tissue masses. LYMPH: No C/SC nodes. CV:  Reg rhythm. Rate NL. No ectopy. No murmur. GI: Abd soft, NT, No masses. No hepatosplenomegaly. PULM: CTA  EXT: No edema   VASC:  No Carotid bruits. Pedal pulses 2+. SKIN: No rashes or lesions of concern  NEURO:  No focal deficits.      Procedure:  Cerumen removed from both external auditory canals utilizing curette and irrigation. Otoscopy used for magnification----> TMs normal        ASSESSMENT/PLAN:    1. Elevated LFTs  This is a persistent problem however her LFTs were a little higher  Patient has requested further evaluation and I agree  She has a GI appointment towards the end of September.  - Alpha-1-Antitrypsin; Future  - F-actin (smooth muscle) antibody w/ reflex to titer; Future  - Ferritin; Future  - Hepatitis A Antibody, Total; Future  - Hepatitis B Core Antibody, Total; Future  - Hepatitis B Surface Antibody; Future  - Hepatitis B Surface Antigen; Future  - Hepatitis C Antibody; Future  - Iron and TIBC; Future  - Mitochondrial antibodies, M2; Future  - US LIVER; Future  - PROTEIN ELECTROPHORESIS, SERUM; Future  - LOKI; Future    2. CLL (chronic lymphocytic leukemia) (HCC)  Condition is stable  Follow-up with Dr. Delmy Baron    3. Benign essential HTN  Blood pressure is well controlled  Continue current medications  Continue a no added salt diet  Recommend she start walking a little bit more    4. CAD in native artery  Condition is asymptomatic and seems to be stable  Recommend she follow-up with cardiology for a checkup    5.  Impaired fasting blood sugar  Condition is stable  Continue lifestyle approach with carbohydrate restriction and exercise

## 2020-08-11 NOTE — PATIENT INSTRUCTIONS
To do list:    #1 please obtain additional lab tests to evaluate the liver along with your liver ultrasound.   Someone from scheduling should call you with regards to the liver ultrasound    #2 please schedule an overdue follow-up with your cardiologist.

## 2020-08-25 ENCOUNTER — HOSPITAL ENCOUNTER (OUTPATIENT)
Dept: ULTRASOUND IMAGING | Age: 80
Discharge: HOME | End: 2020-08-25
Payer: MEDICARE

## 2020-08-25 ENCOUNTER — HOSPITAL ENCOUNTER (OUTPATIENT)
Age: 80
Discharge: HOME | End: 2020-08-25
Payer: MEDICARE

## 2020-08-25 LAB
FERRITIN: 198.2 NG/ML (ref 15–150)
HBV SURFACE AB TITR SER: <3.5 MIU/ML
HEPATITIS B SURFACE ANTIGEN INTERPRETATION: NORMAL
HEPATITIS C ANTIBODY INTERPRETATION: NORMAL
IRON SATURATION: 36 % (ref 15–50)
IRON: 117 UG/DL (ref 37–145)
TOTAL IRON BINDING CAPACITY: 329 UG/DL (ref 260–445)

## 2020-08-25 PROCEDURE — 82103 ALPHA-1-ANTITRYPSIN TOTAL: CPT

## 2020-08-25 PROCEDURE — 36415 COLL VENOUS BLD VENIPUNCTURE: CPT

## 2020-08-25 PROCEDURE — 86803 HEPATITIS C AB TEST: CPT

## 2020-08-25 PROCEDURE — 87340 HEPATITIS B SURFACE AG IA: CPT

## 2020-08-25 PROCEDURE — 84165 PROTEIN E-PHORESIS SERUM: CPT

## 2020-08-25 PROCEDURE — 83516 IMMUNOASSAY NONANTIBODY: CPT

## 2020-08-25 PROCEDURE — 84155 ASSAY OF PROTEIN SERUM: CPT

## 2020-08-25 PROCEDURE — 86704 HEP B CORE ANTIBODY TOTAL: CPT

## 2020-08-25 PROCEDURE — 86706 HEP B SURFACE ANTIBODY: CPT

## 2020-08-25 PROCEDURE — 86038 ANTINUCLEAR ANTIBODIES: CPT

## 2020-08-25 PROCEDURE — 83550 IRON BINDING TEST: CPT

## 2020-08-25 PROCEDURE — 76705 ECHO EXAM OF ABDOMEN: CPT

## 2020-08-25 PROCEDURE — 83540 ASSAY OF IRON: CPT

## 2020-08-25 PROCEDURE — 82728 ASSAY OF FERRITIN: CPT

## 2020-08-25 PROCEDURE — 86708 HEPATITIS A ANTIBODY: CPT

## 2020-08-26 ENCOUNTER — TELEPHONE (OUTPATIENT)
Dept: INTERNAL MEDICINE CLINIC | Age: 80
End: 2020-08-26

## 2020-08-26 LAB
ALBUMIN SERPL-MCNC: 3.6 G/DL (ref 3.1–4.9)
ALPHA-1-GLOBULIN: 0.3 G/DL (ref 0.2–0.4)
ALPHA-2-GLOBULIN: 0.9 G/DL (ref 0.4–1.1)
ANTI-NUCLEAR ANTIBODY (ANA): NEGATIVE
BETA GLOBULIN: 1.3 G/DL (ref 0.9–1.6)
GAMMA GLOBULIN: 1 G/DL (ref 0.6–1.8)
HAV AB SERPL IA-ACNC: NEGATIVE
HEPATITIS B CORE TOTAL ANTIBODY: NEGATIVE
SPE/IFE INTERPRETATION: NORMAL
TOTAL PROTEIN: 7 G/DL (ref 6.4–8.2)

## 2020-08-26 NOTE — TELEPHONE ENCOUNTER
----- Message from Reyna Lloyd sent at 8/26/2020  1:42 PM EDT -----  Subject: Message to Provider    QUESTIONS  Information for Provider? she took her blood test yesterday . and she   wants to know when she should get her flu shot .   ---------------------------------------------------------------------------  --------------  CALL BACK INFO  What is the best way for the office to contact you? OK to leave message on   voicemail  Preferred Call Back Phone Number? [phone]  ---------------------------------------------------------------------------  --------------  SCRIPT ANSWERS  Relationship to Patient?  Self

## 2020-08-27 LAB
F-ACTIN AB IGG: 7 UNITS (ref 0–19)
MITOCHONDRIAL M2 AB, IGG: 2 UNITS (ref 0–24.9)

## 2020-08-28 LAB — ALPHA-1 ANTITRYPSIN: 127 MG/DL (ref 90–200)

## 2020-10-22 RX ORDER — AMLODIPINE BESYLATE 5 MG/1
TABLET ORAL
Qty: 180 TABLET | Refills: 1 | Status: SHIPPED | OUTPATIENT
Start: 2020-10-22 | End: 2021-05-19

## 2021-04-22 ENCOUNTER — TELEPHONE (OUTPATIENT)
Dept: INTERNAL MEDICINE CLINIC | Age: 81
End: 2021-04-22

## 2021-04-22 NOTE — LETTER
[facility]) Physicians [address] [address]  Phone: [phone]  Fax: [phone]    Katy Delgado DO        04/22/2021    Patient: Sushil Paris   YOB: 1940   Date of Visit: 2/26/2016       To Whom It May Concern: It is my medical opinion that Rylee Rincon requires a disability parking placard for the following reasons:  She has limited walking ability due to an arthritic condition. Duration of need: 5 years    If you have any questions or concerns, please don't hesitate to call.     Sincerely,      Cristiane Carolina, DO

## 2021-05-19 RX ORDER — AMLODIPINE BESYLATE 5 MG/1
TABLET ORAL
Qty: 180 TABLET | Refills: 1 | Status: SHIPPED | OUTPATIENT
Start: 2021-05-19 | End: 2021-10-26 | Stop reason: SDUPTHER

## 2021-06-17 NOTE — PROGRESS NOTES
[facility]   Cardiac Evaluation      Patient: Aundria Goodpasture  YOB: 1940         Chief Complaint   Patient presents with    Coronary Artery Disease    Hypertension    Edema     feeet and ankle        Referring provider: Shelbie Arevalo DO    History of Present Illness:  Katherleen Lesch is an 80 y.o. female here to re establish care. She has a history of MI/CAD (stents in '12), Htn, Hld. She was last seen in office in 2018 with Karo Ocampo NP. Today Anca Oakley is here with complaints of swelling in her feet and ankles. She denies chest pain/tightness, mild shortness of breath sometimes, she is able to take a flight of stairs without stopping to catch her breath. She reports that she does not take her aspirin because she takes vitamins to help with circulation. She doesn't want to take aspirin because it does not help her and it is not good for her. With regard to medication therapy he/she has been compliant with prescribed regimen and has tolerated therapy to date. Past Medical History:   has a past medical history of Allergic rhinitis, Anesthesia, Arthropathy of cervical facet joint, CAD (coronary artery disease), CLL (chronic lymphocytic leukemia) ([address] DDD (degenerative disc disease), cervical, Diverticulosis, Fatty liver, Foraminal stenosis of cervical region, GERD (gastroesophageal reflux disease), Hepatomegaly, Hyperlipidemia, Hypertension, Impaired fasting glucose, Kidney stones, MI (myocardial infarction) ([address] Movement disorder, Obesity, Osteopenia, Osteoporosis, Right carpal tunnel syndrome, Ulnar neuropathy at elbow of right upper extremity, and Urge incontinence. Surgical History:   has a past surgical history that includes Cholecystectomy (1966); Ovary removal (04/1983); Colonoscopy (10/21/2010); Upper gastrointestinal endoscopy (8/2011); Wrist fracture surgery (9/27/2011); Hysterectomy (1976); eye surgery; Tonsillectomy;  Appendectomy; fracture surgery (10/2011); Percutaneous Transluminal Coronary Angio (7/13/2012); Colonoscopy (12/11/2014); Upper gastrointestinal endoscopy (12/11/2015); Colonoscopy (N/A, 5/20/2019); and Colonoscopy (5/20/2019). Current Outpatient Medications   Medication Sig Dispense Refill    amLODIPine (NORVASC) 5 MG tablet TAKE 1 TABLET BY MOUTH TWICE DAILY 180 tablet 1    levocetirizine (XYZAL) 5 MG tablet Take 5 mg by mouth nightly      magnesium 30 MG tablet Take 30 mg by mouth 2 times daily      Coenzyme Q10 (COQ10) 100 MG CAPS Take by mouth      Vitamin D-Vitamin K (K2 PLUS D3 PO) Take by mouth      Multiple Vitamins-Minerals (MULTIVITAMIN ADULT PO) Take by mouth      Cranberry 125 MG TABS Take by mouth      Phytosterol Esters (CHOLEST CARE PO) Take  by mouth.  nitroGLYCERIN (NITROSTAT) 0.4 MG SL tablet Place 1 tablet under the tongue every 5 minutes as needed for Chest pain. 25 tablet 1    Cyanocobalamin (VITAMIN B-12) 1000 MCG extended release tablet Take 1,000 mcg by mouth daily (Patient not taking: Reported on 6/18/2021)      aspirin EC 81 MG EC tablet Take 1 tablet by mouth daily. (Patient not taking: Reported on 6/18/2021) 30 tablet 3     No current facility-administered medications for this visit.        Allergies:  Latex, Antihistamine [diphenhydramine hcl], Decongest, Penicillins, Biaxin [clarithromycin], Iodine, Rhinocort [budesonide], Sulfa antibiotics, Codeine, and Cortisone     Social History:  Social History     Socioeconomic History    Marital status:      Spouse name: Not on file    Number of children: Not on file    Years of education: Not on file    Highest education level: Not on file   Occupational History    Not on file   Tobacco Use    Smoking status: Never Smoker    Smokeless tobacco: Never Used   Substance and Sexual Activity    Alcohol use: Yes     Comment: rare/once yearly    Drug use: No    Sexual activity: Not on file   Other Topics Concern    Not on file   Social History Narrative    Not on file     Social Determinants of Health     Financial Resource Strain:     Difficulty of Paying Living Expenses:    Food Insecurity:     Worried About Running Out of Food in the Last Year:     [address] in the Last Year:    Transportation Needs:     Lack of Transportation (Medical):  Lack of Transportation (Non-Medical):    Physical Activity:     Days of Exercise per Week:     Minutes of Exercise per Session:    Stress:     Feeling of Stress :    Social Connections:     Frequency of Communication with Friends and Family:     Frequency of Social Gatherings with Friends and Family:     Attends Spiritism Services:     Active Member of Clubs or Organizations:     Attends Club or Organization Meetings:     Marital Status:    Intimate Partner Violence:     Fear of Current or Ex-Partner:     Emotionally Abused:     Physically Abused:     Sexually Abused:        Family History:   Family History   Problem Relation Age of Onset    Heart Disease Mother     Rheum Arthritis Mother     Alcohol Abuse Father     Breast Cancer Maternal Grandmother      Family history has been reviewed and not pertinent except as noted above. Review of Systems:   · Constitutional: there has been no unanticipated weight loss. No change in energy or activity level   · Eyes: No visual changes   · ENT: No Headaches, hearing loss or vertigo. No mouth sores or sore throat. · Cardiovascular: Reviewed in HPI  · Respiratory: No cough or wheezing, no sputum production. · Gastrointestinal: No abdominal pain, appetite loss, blood in stools. No change in bowel or bladder habits. · Genitourinary: No nocturia, dysuria, trouble voiding  · Musculoskeletal:  No gait disturbance, weakness or joint complaints. · Integumentary: No rash or pruritis. · Neurological: No headache, change in muscle strength, numbness or tingling. No change in gait, balance, coordination, mood, affect, memory, mentation, behavior.   · Psychiatric: No anxiety or depression  · Endocrine: No malaise or fever  · Hematologic/Lymphatic: No abnormal bruising or bleeding, blood clots or swollen lymph nodes. · Allergic/Immunologic: No nasal congestion or hives. Physical Examination:    Vitals:    06/18/21 1459   BP: 134/88   Pulse: 88   SpO2: 97%   Weight: 193 lb (87.5 kg)   Height: 5' 2\" (1.575 m)     Body mass index is 35.3 kg/m². Wt Readings from Last 3 Encounters:   06/18/21 193 lb (87.5 kg)   08/11/20 188 lb (85.3 kg)   12/11/19 186 lb (84.4 kg)      BP Readings from Last 3 Encounters:   06/18/21 134/88   08/11/20 120/70   12/11/19 120/70        Physical Examination:    · CONSTITUTIONAL: Well developed, well nourished  · EYES: PERRLA. No xanthelasma, sclera non icteric  · EARS,NOSE,MOUTH,THROAT:  Mucous membranes moist, normal hearing  · NECK: Supple, JVP normal, thyroid not enlarged. Carotids 2+ without bruits  · RESPIRATORY: Normal effort, no rales or rhonchi  · CARDIOVASCULAR: Normal PMI, regular rate and rhythm, no murmurs, rub or gallop. No edema. Radial pulses present and equal  · CHEST: No scar or masses  · ABDOMEN: Normal bowel sounds. No masses or tenderness. No bruit  · MUSCULOSKELETAL: No clubbing or cyanosis. Moves all extremities well. Normal gait  · SKIN:  Warm and dry. No rashes  · NEUROLOGIC: Cranial nerves intact. Alert and oriented  · PSYCHIATRIC: Calm affect. Appears to have normal judgement and insight    All testing and labs listed below were personally reviewed by myself. Stress 2019   Summary  Tiny fixed anteroapical defect related to fibrosis or soft tissue  attenuation. No ischemia. Normal LV function. Overall findings represent a low risk scan. July 2012 MI -- LHC> High grade bifurcation stenosis of the LAD and large first diagonal branch. RENA grade 2 flow through the LAD. Apical hypokinesis with generally preserved global left ventricular systolic function. Balloon angioplasty with kissing balloons.  Kissing stents of the LAD and diagonal.     Assessment/Plan  1. Coronary artery disease involving native coronary artery of native heart without angina pectoris    2. Essential hypertension    3. Hyperlipidemia, unspecified hyperlipidemia type    4. Diastolic heart failure, unspecified HF chronicity ([address]    5. Swelling          Coronary artery disease involving native coronary artery of native heart without angina pectoris  Angina ~ none  CCS class 1  Intervention  Stress~ '19 no ischemia   LHC~ '12 PCI to LAD/Diag  Current meds~ asa - has not been taking  Plan~ she declines aspirin; risks discussed in office. Essential hypertension  Controlled  Meds~ amlodipine   Plan~ no changes, takes nightly      Hyperlipidemia  Lab Results   Component Value Date    CHOL 170 07/30/2020    TRIG 247 07/30/2020    HDL 39 07/30/2020    HDL 41 06/28/2011    LDLCALC 82 07/30/2020     Meds~ Coq 10  Plan~ declines any statins      Diastolic heart failure (HCC)  ASymptomatic  NYHA score~ 1  Diastolic  EF~ 55%   nonischemic  Current HF meds~ spironolactone  Plan~ mild swelling noted in ankles, continue spironolactone, elevate legs        No orders of the defined types were placed in this encounter. Follow up 1 year with NP    Cheryl Lund MD      Thank you for allowing to me to participate in the care of Linda Antoine. Scribe's Attestation: This note was scribed in the presence of Dr. Jigar Marin MD by Julianne Gunter RN.    I, Dr. Jigar Marin, personally performed the services described in this documentation, as scribed by the above signed scribe in my presence. It is both accurate and complete to my knowledge. I agree with the details independently gathered by the clinical support staff, while the remaining scribed note accurately describes my personal service to the patient.

## 2021-06-17 NOTE — ASSESSMENT & PLAN NOTE
Angina ~ none  CCS class 1  Intervention  Stress~ '19 no ischemia   LHC~ '12 PCI to LAD/Diag  Current meds~ asa - has not been taking  Plan~ she declines aspirin; risks discussed in office.

## 2021-06-17 NOTE — PATIENT INSTRUCTIONS
Follow up 1 year with ALESSANDRO mcknight, call the office if you develop symptoms like chest pain or shortness of breath with activity  We do recommend you take aspirin 81 mg since you have cardiac stents

## 2021-06-18 ENCOUNTER — OFFICE VISIT (OUTPATIENT)
Dept: CARDIOLOGY CLINIC | Age: 81
End: 2021-06-18
Payer: MEDICARE

## 2021-06-18 VITALS
DIASTOLIC BLOOD PRESSURE: 88 MMHG | OXYGEN SATURATION: 97 % | WEIGHT: 193 LBS | SYSTOLIC BLOOD PRESSURE: 134 MMHG | BODY MASS INDEX: 35.51 KG/M2 | HEART RATE: 88 BPM | HEIGHT: 62 IN

## 2021-06-18 DIAGNOSIS — I50.30 DIASTOLIC HEART FAILURE, UNSPECIFIED HF CHRONICITY (HCC): ICD-10-CM

## 2021-06-18 DIAGNOSIS — I25.10 CORONARY ARTERY DISEASE INVOLVING NATIVE CORONARY ARTERY OF NATIVE HEART WITHOUT ANGINA PECTORIS: Primary | ICD-10-CM

## 2021-06-18 DIAGNOSIS — E78.5 HYPERLIPIDEMIA, UNSPECIFIED HYPERLIPIDEMIA TYPE: ICD-10-CM

## 2021-06-18 DIAGNOSIS — I10 ESSENTIAL HYPERTENSION: ICD-10-CM

## 2021-06-18 DIAGNOSIS — R60.9 SWELLING: ICD-10-CM

## 2021-06-18 PROCEDURE — 99204 OFFICE O/P NEW MOD 45 MIN: CPT | Performed by: INTERNAL MEDICINE

## 2021-06-18 PROCEDURE — G8417 CALC BMI ABV UP PARAM F/U: HCPCS | Performed by: INTERNAL MEDICINE

## 2021-06-18 PROCEDURE — 1090F PRES/ABSN URINE INCON ASSESS: CPT | Performed by: INTERNAL MEDICINE

## 2021-06-18 PROCEDURE — G8427 DOCREV CUR MEDS BY ELIG CLIN: HCPCS | Performed by: INTERNAL MEDICINE

## 2021-06-18 NOTE — ASSESSMENT & PLAN NOTE
ASymptomatic  NYHA score~ 1  Diastolic  EF~ 64%   nonischemic  Current HF meds~ spironolactone  Plan~ mild swelling noted in ankles, continue spironolactone, elevate legs

## 2021-06-30 ENCOUNTER — HOSPITAL ENCOUNTER (OUTPATIENT)
Dept: CT IMAGING | Age: 81
Discharge: HOME | End: 2021-06-30
Payer: MEDICARE

## 2021-06-30 DIAGNOSIS — C91.10 LEUKEMIA, LYMPHOCYTIC, CHRONIC (HCC): ICD-10-CM

## 2021-06-30 PROCEDURE — 6360000004 HC RX CONTRAST MEDICATION: Performed by: INTERNAL MEDICINE

## 2021-06-30 PROCEDURE — 74176 CT ABD & PELVIS W/O CONTRAST: CPT

## 2021-06-30 RX ADMIN — IOHEXOL 50 ML: 240 INJECTION, SOLUTION INTRATHECAL; INTRAVASCULAR; INTRAVENOUS; ORAL at 14:41

## 2021-07-16 ENCOUNTER — TELEPHONE (OUTPATIENT)
Dept: INTERNAL MEDICINE CLINIC | Age: 81
End: 2021-07-16

## 2021-07-16 NOTE — TELEPHONE ENCOUNTER
Patient had a CT on 6/30/21, which was order by Dr. Halle Prado. She still has not heard from that dr with the results.  She wanted to make sure Dr. Fields Deep saw them

## 2021-07-16 NOTE — TELEPHONE ENCOUNTER
Additionally: There is lobular contour the right kidney. No hydronephrosis on the right. 2 mm stone seen in the right kidney       There is lobular contour left kidney. Parapelvic cyst seen in the left kidney       GI/Bowel: No significant small bowel distention noted. Moderate to large   stool load seen in the colon. A few colonic diverticula are seen.      These other findings may need to be addressed

## 2021-09-27 ENCOUNTER — TELEPHONE (OUTPATIENT)
Dept: INTERNAL MEDICINE CLINIC | Age: 81
End: 2021-09-27

## 2021-09-27 NOTE — TELEPHONE ENCOUNTER
----- Message from Edith Randy sent at 9/27/2021  4:38 PM EDT -----  Subject: Message to Provider    QUESTIONS  Information for Provider? pt would like to get her ears cleaned out at a   6mo f/u appt. this was her question. Also wants to schedule at same time   as . Pt also screened red for shaving a little phlegm in throat   that she thought could be allergies. (congestion) a little. Please   call/advise.   ---------------------------------------------------------------------------  --------------  CALL BACK INFO  What is the best way for the office to contact you? OK to leave message on   voicemail,Do not leave any message, patient will call back for answer  Preferred Call Back Phone Number? [phone]  ---------------------------------------------------------------------------  --------------  SCRIPT ANSWERS  Relationship to Patient? Self  (Is the patient requesting to be seen urgently for their symptoms?)? No  Is this follow up request related to routine Diabetes Management? No  Are you having any new concerns about your existing condition? Yes  Have you been diagnosed with, awaiting test results for, or told that you   are suspected of having COVID-19 (Coronavirus)? (If patient has tested   negative or was tested as a requirement for work, school, or travel and   not based on symptoms, answer no)? No  Within the past two weeks have you developed any of the following symptoms   (answer no if symptoms have been present longer than 2 weeks or began   more than 2 weeks ago)? Fever or Chills, Cough, Shortness of breath or   difficulty breathing, Loss of taste or smell, Sore throat, Nasal   congestion, Sneezing or runny nose, Fatigue or generalized body aches   (answer no if pain is specific to a body part e.g. back pain), Diarrhea,   Headache? No  Have you had close contact with someone with COVID-19 in the last 14 days?    No  (Service Expert  click yes below to proceed with [employer]ual

## 2021-10-26 ENCOUNTER — OFFICE VISIT (OUTPATIENT)
Dept: INTERNAL MEDICINE CLINIC | Age: 81
End: 2021-10-26
Payer: MEDICARE

## 2021-10-26 VITALS
OXYGEN SATURATION: 94 % | HEIGHT: 62 IN | RESPIRATION RATE: 12 BRPM | HEART RATE: 91 BPM | WEIGHT: 188 LBS | DIASTOLIC BLOOD PRESSURE: 86 MMHG | SYSTOLIC BLOOD PRESSURE: 118 MMHG | BODY MASS INDEX: 34.6 KG/M2

## 2021-10-26 DIAGNOSIS — I10 ESSENTIAL HYPERTENSION: ICD-10-CM

## 2021-10-26 DIAGNOSIS — H61.21 IMPACTED CERUMEN, RIGHT EAR: ICD-10-CM

## 2021-10-26 DIAGNOSIS — R73.01 IMPAIRED FASTING BLOOD SUGAR: Primary | ICD-10-CM

## 2021-10-26 DIAGNOSIS — E78.5 HYPERLIPIDEMIA, UNSPECIFIED HYPERLIPIDEMIA TYPE: ICD-10-CM

## 2021-10-26 DIAGNOSIS — I25.10 CORONARY ARTERY DISEASE INVOLVING NATIVE CORONARY ARTERY OF NATIVE HEART WITHOUT ANGINA PECTORIS: ICD-10-CM

## 2021-10-26 DIAGNOSIS — Z23 FLU VACCINE NEED: ICD-10-CM

## 2021-10-26 DIAGNOSIS — C91.10 CLL (CHRONIC LYMPHOCYTIC LEUKEMIA) (HCC): ICD-10-CM

## 2021-10-26 DIAGNOSIS — R79.89 ELEVATED LFTS: ICD-10-CM

## 2021-10-26 PROCEDURE — 1036F TOBACCO NON-USER: CPT | Performed by: INTERNAL MEDICINE

## 2021-10-26 PROCEDURE — G8417 CALC BMI ABV UP PARAM F/U: HCPCS | Performed by: INTERNAL MEDICINE

## 2021-10-26 PROCEDURE — 1123F ACP DISCUSS/DSCN MKR DOCD: CPT | Performed by: INTERNAL MEDICINE

## 2021-10-26 PROCEDURE — 4040F PNEUMOC VAC/ADMIN/RCVD: CPT | Performed by: INTERNAL MEDICINE

## 2021-10-26 PROCEDURE — 1090F PRES/ABSN URINE INCON ASSESS: CPT | Performed by: INTERNAL MEDICINE

## 2021-10-26 PROCEDURE — G8427 DOCREV CUR MEDS BY ELIG CLIN: HCPCS | Performed by: INTERNAL MEDICINE

## 2021-10-26 PROCEDURE — 69210 REMOVE IMPACTED EAR WAX UNI: CPT | Performed by: INTERNAL MEDICINE

## 2021-10-26 PROCEDURE — G8484 FLU IMMUNIZE NO ADMIN: HCPCS | Performed by: INTERNAL MEDICINE

## 2021-10-26 PROCEDURE — G8399 PT W/DXA RESULTS DOCUMENT: HCPCS | Performed by: INTERNAL MEDICINE

## 2021-10-26 PROCEDURE — 90694 VACC AIIV4 NO PRSRV 0.5ML IM: CPT | Performed by: INTERNAL MEDICINE

## 2021-10-26 PROCEDURE — 99214 OFFICE O/P EST MOD 30 MIN: CPT | Performed by: INTERNAL MEDICINE

## 2021-10-26 PROCEDURE — G0008 ADMIN INFLUENZA VIRUS VAC: HCPCS | Performed by: INTERNAL MEDICINE

## 2021-10-26 RX ORDER — AMLODIPINE BESYLATE 5 MG/1
5 TABLET ORAL DAILY
Qty: 90 TABLET | Refills: 0
Start: 2021-10-26 | End: 2022-08-26

## 2021-10-26 RX ORDER — LEVOCETIRIZINE DIHYDROCHLORIDE 5 MG/1
5 TABLET, FILM COATED ORAL NIGHTLY PRN
Status: SHIPPED | COMMUNITY
Start: 2021-10-26

## 2021-10-26 RX ORDER — ASPIRIN 81 MG/1
81 TABLET ORAL
Qty: 30 TABLET | Refills: 3 | Status: SHIPPED | OUTPATIENT
Start: 2021-10-28

## 2021-10-26 NOTE — PROGRESS NOTES
[facility]) Physicians  Internal Medicine  Patient Encounter  Buffy Ling D.O., [facility] [place]        Chief Complaint   Patient presents with   [facility]     urinary frequencey, recently saw urologist who stated she has Kidney stone that should be passed easily. HPI: 80 y.o. female seen today requesting a routine checkup regarding the status of her current chronic medical problems listed below along with a medication review. Pt continues to have urinary continence. She saw the urologist-- Dr. Britta Rizvi. She states she does not want to take medication. She reports frequency, nocturia, leakage. Saw Dr. Yumiko Robles-- She was taken off the Xyzal.        CLL/Small lymphocytic lymphoma-- She is under the care of Dr. Nati Cardona. She denies swollen lymph nodes. CT scan 6/30/2021 showed a small right kidney stone and a lobular contour of both kidneys. Lab Results   Component Value Date    WBC 13.4 (H) 07/30/2020    HGB 15.7 07/30/2020    HCT 47.5 07/30/2020    MCV 93.1 07/30/2020     07/30/2020        HTN-- She states she has only been taking one Amlodipine tab daily instead of two. IFG--Patient denies any problems with excessive thirst or frequent urination. Lab Results   Component Value Date    LABA1C 5.4 07/30/2020     Lab Results   Component Value Date    .3 07/30/2020      Hyperlipidemia:   Lab Results   Component Value Date    LDLCALC 82 07/30/2020   Patient has spent many years refusing statin therapy. Despite her diagnosis of coronary artery disease noted below, she continues to refuse statins. She has insisted on taking over-the-counter fight of sterols. Patient understands the risks of progressive coronary artery disease. CAD/diastolic congestive heart failure-- Previous history---->  She has H/O PTCA with three stents 7/30/2012 after suffering an acute MI.     Her last stress Myoview 11/29/2019--   Summary    Tiny fixed anteroapical defect related to fibrosis or soft tissue    attenuation. No ischemia.    Normal LV function.    Overall findings represent a low risk scan. Interval history----> patient saw her cardiology specialist Dr. Sascha Connelly on 6/18/2021. Before that visit it had been 3 years since she had seen cardiology. His office note is reviewed. True to form, [place] has refused to take aspirin as well. However, she states she may take the ASA once or twice per week. Elevated LFT-- She has slightly elevated, LFT's over the years. Past Medical History:   Diagnosis Date    Allergic rhinitis     Anesthesia slow to wake    Arthropathy of cervical facet joint     CAD (coronary artery disease) 7/12/2012    CLL (chronic lymphocytic leukemia) ([facility] [address] 5/29/2019    DDD (degenerative disc disease), cervical     Diverticulosis 10/21/2010    Fatty liver     Foraminal stenosis of cervical region     GERD (gastroesophageal reflux disease)     Hepatomegaly     Hyperlipidemia     Hypertension     Impaired fasting glucose     Kidney stones     MI (myocardial infarction) ([facility] [address] 7/12/2012    Movement disorder     Obesity     Osteopenia     Osteoporosis     Right carpal tunnel syndrome 05/02/2017    Ulnar neuropathy at elbow of right upper extremity 05/02/2017    Urge incontinence        Review of Systems - As per HPI      OBJECTIVE:  Vitals:    10/26/21 1533   BP: 118/86   Pulse: 91   Resp: 12   SpO2: 94%   Weight: 188 lb (85.3 kg)   Height: 5' 2\" (1.575 m)     Body mass index is 34.39 kg/m². Wt Readings from Last 3 Encounters:   10/26/21 188 lb (85.3 kg)   06/18/21 193 lb (87.5 kg)   08/11/20 188 lb (85.3 kg)     BP Readings from Last 3 Encounters:   10/26/21 118/86   06/18/21 134/88   08/11/20 120/70        GEN: NAD, A&O, Non-toxic  HEENT: NC/AT, CHRISTIANE, EOMI, Anicteric. Oral cavity clear,  throat normal.  Tongue midline. AU-- Impacted cerumen. NECK: Supple. No thyromegaly or nodules. No JVD. No soft tissue masses.     LYMPH: No C/SC nodes.  CV:  Reg rhythm. Rate NL. No ectopy. No murmur. GI: Abd soft, NT, No masses. No hepatosplenomegaly. PULM: CTA  EXT: No edema   VASC:  No Carotid bruits. Pedal pulses 2+. SKIN: No rashes or lesions of concern  NEURO:  No focal deficits. Procedure:  Cerumen removed from the right external auditory canals utilizing curette and irrigation. Otoscopy used for magnification----> TMs normal      ASSESSMENT/PLAN:    1. Impaired fasting blood sugar  Condition stability and control are uncertain  Due for lab  Continue efforts with lifestyle modification.  - Comprehensive Metabolic Panel; Future  - Hemoglobin A1C; Future    2. CLL (chronic lymphocytic leukemia) ([facility] [address]  Patient under the care of hematology/oncology  Continue routine follow-up    3. Flu vaccine need  Flu vaccine today  - INFLUENZA, QUADV, ADJUVANTED, 65 YRS =, IM, PF, PREFILL SYR, 0.5ML (FLUAD)    4. Hyperlipidemia, unspecified hyperlipidemia type  Condition stability and control are uncertain. Due for lab  Patient continues to refuse statin therapy  She will continue her phytosterols  - Comprehensive Metabolic Panel; Future  - Lipid Panel; Future    5. Essential hypertension  Blood pressure is well controlled  She will continue the amlodipine once daily  Continue to monitor for hypotension  - CBC Auto Differential; Future  - Comprehensive Metabolic Panel; Future  - Lipid Panel; Future  - TSH without Reflex; Future    6. Coronary artery disease involving native coronary artery of native heart without angina pectoris  Condition is stable without signs of angina or anginal equivalents  Continue to monitor for other signs of cardiac decompensation.  - Comprehensive Metabolic Panel; Future  - Lipid Panel; Future    7. Elevated LFTs  Condition stability is uncertain  Recheck lab  - Comprehensive Metabolic Panel; Future    8.  Impacted cerumen, right ear  Condition improved  - [id number] - [place] REMOVE IMPACTED EAR WAX

## 2021-10-29 DIAGNOSIS — R32 URINARY INCONTINENCE, UNSPECIFIED TYPE: Primary | ICD-10-CM

## 2021-10-29 DIAGNOSIS — N32.81 OAB (OVERACTIVE BLADDER): ICD-10-CM

## 2021-11-22 NOTE — TELEPHONE ENCOUNTER
Patient calling regarding mirabegron (MYRBETRIQ) 25 MG TB24. This medication will cost her $430+ at the pharmacy. Samples have been left for her at the . Is there a less expensive alternative for this patient?

## 2021-11-22 NOTE — TELEPHONE ENCOUNTER
She can transition to Vesicare 5 mg daily or Detrol LA 4 mg daily. She will have to check which is cheaper.

## 2021-12-10 ENCOUNTER — TELEPHONE (OUTPATIENT)
Dept: INTERNAL MEDICINE CLINIC | Age: 81
End: 2021-12-10

## 2021-12-10 NOTE — TELEPHONE ENCOUNTER
Patient called stating that anytime she has labs done she wants to make sure she has a magnesium lab drawn.  For future references

## 2021-12-13 ENCOUNTER — HOSPITAL ENCOUNTER (OUTPATIENT)
Dept: SURGERY | Age: 81
Discharge: HOME | End: 2021-12-13
Payer: MEDICARE

## 2021-12-13 VITALS — SYSTOLIC BLOOD PRESSURE: 156 MMHG | DIASTOLIC BLOOD PRESSURE: 92 MMHG

## 2021-12-13 PROCEDURE — 66821 AFTER CATARACT LASER SURGERY: CPT

## 2021-12-13 PROCEDURE — 6370000000 HC RX 637 (ALT 250 FOR IP): Performed by: OPHTHALMOLOGY

## 2021-12-13 RX ORDER — PHENYLEPHRINE HCL 2.5 %
1 DROPS OPHTHALMIC (EYE) ONCE
Status: COMPLETED | OUTPATIENT
Start: 2021-12-13 | End: 2021-12-13

## 2021-12-13 RX ORDER — SODIUM CHLORIDE 0.9 % (FLUSH) 0.9 %
5-40 SYRINGE (ML) INJECTION PRN
Status: DISCONTINUED | OUTPATIENT
Start: 2021-12-13 | End: 2021-12-14 | Stop reason: HOSPADM

## 2021-12-13 RX ORDER — SODIUM CHLORIDE 9 MG/ML
25 INJECTION, SOLUTION INTRAVENOUS PRN
Status: DISCONTINUED | OUTPATIENT
Start: 2021-12-13 | End: 2021-12-14 | Stop reason: HOSPADM

## 2021-12-13 RX ORDER — SODIUM CHLORIDE 0.9 % (FLUSH) 0.9 %
5-40 SYRINGE (ML) INJECTION EVERY 12 HOURS SCHEDULED
Status: DISCONTINUED | OUTPATIENT
Start: 2021-12-13 | End: 2021-12-14 | Stop reason: HOSPADM

## 2021-12-13 RX ORDER — TROPICAMIDE 10 MG/ML
1 SOLUTION/ DROPS OPHTHALMIC ONCE
Status: COMPLETED | OUTPATIENT
Start: 2021-12-13 | End: 2021-12-13

## 2021-12-13 RX ADMIN — TROPICAMIDE 1 DROP: 10 SOLUTION/ DROPS OPHTHALMIC at 10:37

## 2021-12-13 RX ADMIN — PHENYLEPHRINE HYDROCHLORIDE 1 DROP: 25 SOLUTION/ DROPS OPHTHALMIC at 10:37

## 2021-12-13 ASSESSMENT — PAIN - FUNCTIONAL ASSESSMENT: PAIN_FUNCTIONAL_ASSESSMENT: 0-10

## 2021-12-13 NOTE — PROGRESS NOTES
Patient: Archie Rios  1940, 81 y.o./female    Ambulatory to laser room. Right eye marked and numbed by Dr. Lucero Garcia. Laser procedure done and tolerated well by patient. Post  instructions given to [facility] by provider.       Power setting was 2.7        # of shots was 85    Total power was 229.5 mJ    Cornell Watt RN

## 2021-12-13 NOTE — OP NOTE
[place] [address]  [phone]      12/13/2021    Patient name: Gina Hale  YOB: 1940  MRN: [id number]    Allergies: Allergies   Allergen Reactions    Latex      welt    Antihistamine [Diphenhydramine Hcl] Shortness Of Breath and Rash    Decongest Shortness Of Breath and Rash    Penicillins Anaphylaxis     Not sure    Biaxin [Clarithromycin] Hives    Iodine Swelling     Swelling of throat. Pt drank PO contrast 6- with no issues, allergy to IV only    Rhinocort [Budesonide]     Sulfa Antibiotics     Codeine Nausea And Vomiting    Cortisone Rash       Pre-operative diagnosis:  After cataract obscuring vision of the right eye. Post-operative diagnosis:  Same    Procedure Performed:  YAG Capsulotomy of the right eye. Anesthesia:  None    Estimated Blood Loss: None    Specimens removed: None    Complications:  None    Description of Procedure: A Yag Capsulotomy was performed today on the right eye. Pt appears to be oriented to time, place, and person. Pre-op medications instilled in the right eye: Proparacaine 1 drop, Hugo-Synephrine 2.5% 1 drop topical and Mydriacyl 0.5% 1 drop topical. Patient is comfortable and will be released to self.      Electronically signed by: Abhishek Tanner MD,12/13/2021,11:28 AM

## 2022-05-25 ENCOUNTER — OFFICE VISIT (OUTPATIENT)
Dept: INTERNAL MEDICINE CLINIC | Age: 82
End: 2022-05-25
Payer: MEDICARE

## 2022-05-25 VITALS
BODY MASS INDEX: 33.86 KG/M2 | RESPIRATION RATE: 12 BRPM | DIASTOLIC BLOOD PRESSURE: 76 MMHG | SYSTOLIC BLOOD PRESSURE: 110 MMHG | HEIGHT: 62 IN | OXYGEN SATURATION: 94 % | HEART RATE: 83 BPM | WEIGHT: 184 LBS

## 2022-05-25 DIAGNOSIS — Z95.5 HISTORY OF CORONARY ANGIOPLASTY WITH INSERTION OF STENT: ICD-10-CM

## 2022-05-25 DIAGNOSIS — Z13.6 SCREENING FOR CARDIOVASCULAR CONDITION: ICD-10-CM

## 2022-05-25 DIAGNOSIS — I50.32 CHRONIC DIASTOLIC CHF (CONGESTIVE HEART FAILURE) (HCC): ICD-10-CM

## 2022-05-25 DIAGNOSIS — K62.5 RECTAL BLEEDING: ICD-10-CM

## 2022-05-25 DIAGNOSIS — R73.01 IMPAIRED FASTING BLOOD SUGAR: ICD-10-CM

## 2022-05-25 DIAGNOSIS — Z23 NEED FOR PNEUMOCOCCAL VACCINATION: ICD-10-CM

## 2022-05-25 DIAGNOSIS — I10 BENIGN ESSENTIAL HTN: ICD-10-CM

## 2022-05-25 DIAGNOSIS — H61.23 IMPACTED CERUMEN, BILATERAL: ICD-10-CM

## 2022-05-25 DIAGNOSIS — Z00.00 MEDICARE ANNUAL WELLNESS VISIT, SUBSEQUENT: Primary | ICD-10-CM

## 2022-05-25 DIAGNOSIS — E78.5 HYPERLIPIDEMIA, UNSPECIFIED HYPERLIPIDEMIA TYPE: ICD-10-CM

## 2022-05-25 DIAGNOSIS — C91.10 CLL (CHRONIC LYMPHOCYTIC LEUKEMIA) (HCC): ICD-10-CM

## 2022-05-25 DIAGNOSIS — I25.10 CORONARY ARTERY DISEASE INVOLVING NATIVE CORONARY ARTERY OF NATIVE HEART WITHOUT ANGINA PECTORIS: ICD-10-CM

## 2022-05-25 PROBLEM — I50.30 DIASTOLIC HEART FAILURE (HCC): Status: RESOLVED | Noted: 2021-06-18 | Resolved: 2022-05-25

## 2022-05-25 PROCEDURE — G8417 CALC BMI ABV UP PARAM F/U: HCPCS | Performed by: INTERNAL MEDICINE

## 2022-05-25 PROCEDURE — 69210 REMOVE IMPACTED EAR WAX UNI: CPT | Performed by: INTERNAL MEDICINE

## 2022-05-25 PROCEDURE — 1123F ACP DISCUSS/DSCN MKR DOCD: CPT | Performed by: INTERNAL MEDICINE

## 2022-05-25 PROCEDURE — 1090F PRES/ABSN URINE INCON ASSESS: CPT | Performed by: INTERNAL MEDICINE

## 2022-05-25 PROCEDURE — G0439 PPPS, SUBSEQ VISIT: HCPCS | Performed by: INTERNAL MEDICINE

## 2022-05-25 PROCEDURE — G8427 DOCREV CUR MEDS BY ELIG CLIN: HCPCS | Performed by: INTERNAL MEDICINE

## 2022-05-25 PROCEDURE — 1036F TOBACCO NON-USER: CPT | Performed by: INTERNAL MEDICINE

## 2022-05-25 PROCEDURE — G8399 PT W/DXA RESULTS DOCUMENT: HCPCS | Performed by: INTERNAL MEDICINE

## 2022-05-25 PROCEDURE — G0446 INTENS BEHAVE THER CARDIO DX: HCPCS | Performed by: INTERNAL MEDICINE

## 2022-05-25 PROCEDURE — 99213 OFFICE O/P EST LOW 20 MIN: CPT | Performed by: INTERNAL MEDICINE

## 2022-05-25 RX ORDER — PNEUMOCOCCAL 20-VALENT CONJUGATE VACCINE 2.2; 2.2; 2.2; 2.2; 2.2; 2.2; 2.2; 2.2; 2.2; 2.2; 2.2; 2.2; 2.2; 2.2; 2.2; 2.2; 4.4; 2.2; 2.2; 2.2 UG/.5ML; UG/.5ML; UG/.5ML; UG/.5ML; UG/.5ML; UG/.5ML; UG/.5ML; UG/.5ML; UG/.5ML; UG/.5ML; UG/.5ML; UG/.5ML; UG/.5ML; UG/.5ML; UG/.5ML; UG/.5ML; UG/.5ML; UG/.5ML; UG/.5ML; UG/.5ML
0.5 INJECTION, SUSPENSION INTRAMUSCULAR ONCE
Qty: 0.5 ML | Refills: 0 | Status: SHIPPED | OUTPATIENT
Start: 2022-05-25 | End: 2022-05-25

## 2022-05-25 ASSESSMENT — PATIENT HEALTH QUESTIONNAIRE - PHQ9
2. FEELING DOWN, DEPRESSED OR HOPELESS: 0
SUM OF ALL RESPONSES TO PHQ QUESTIONS 1-9: 0
SUM OF ALL RESPONSES TO PHQ QUESTIONS 1-9: 0
1. LITTLE INTEREST OR PLEASURE IN DOING THINGS: 0
SUM OF ALL RESPONSES TO PHQ9 QUESTIONS 1 & 2: 0
SUM OF ALL RESPONSES TO PHQ QUESTIONS 1-9: 0
SUM OF ALL RESPONSES TO PHQ QUESTIONS 1-9: 0

## 2022-05-25 ASSESSMENT — LIFESTYLE VARIABLES: HOW OFTEN DO YOU HAVE A DRINK CONTAINING ALCOHOL: NEVER

## 2022-05-25 NOTE — PROGRESS NOTES
[facility]) Physicians  Internal Medicine  Patient Encounter  Michelle Nuno D.O., [facility] Annual Wellness Visit  Germán Pham  5/25/2022    Subjective Germán Pham is here for [facility] AWV    Chief Complaint   Patient presents with    Medicare AWV         HPI:  79 yo seen today for her Medicare AWV. Also, she has additional complaints of 2 episodes of rectal bleeding. One episode ws about a month ago. Pt states she had blood clots. She had a smaller amount of blood a few days later. No abd pain. She denies any weight loss. Patient cannot remember the last time she had a colonoscopy. CAT scan last year showed moderate to large stool burden. Patient also complaining of decreased hearing out of both ears. She thinks she has wax. She denies any pain or drainage.         Medical/Surgical Histories     Past Medical History:   Diagnosis Date    Allergic rhinitis     Anesthesia slow to wake    Arthropathy of cervical facet joint     CAD (coronary artery disease) 7/12/2012    CLL (chronic lymphocytic leukemia) ([facility] [address] 5/29/2019    DDD (degenerative disc disease), cervical     Diverticulosis 10/21/2010    Fatty liver     Foraminal stenosis of cervical region     GERD (gastroesophageal reflux disease)     Hepatomegaly     Hyperlipidemia     Hypertension     Impaired fasting glucose     Kidney stones     MI (myocardial infarction) ([address] 7/12/2012    Movement disorder     Obesity     Osteopenia     Osteoporosis     Right carpal tunnel syndrome 05/02/2017    Ulnar neuropathy at elbow of right upper extremity 05/02/2017    Urge incontinence           Past Surgical History:   Procedure Laterality Date    APPENDECTOMY      CHOLECYSTECTOMY  1966    COLONOSCOPY  10/21/2010    COLONOSCOPY  12/11/2014    Dr. Kusum Mendoza    COLONOSCOPY N/A 5/20/2019    COLONOSCOPY WITH BIOPSY performed by Fahad Carlson MD at [place] [address]  5/20/2019    COLONOSCOPY POLYPECTOMY SNARE/COLD BIOPSY performed by Rach Muñoz MD at [address]      bilateral catarcts removed    FRACTURE SURGERY  10/2011    left wrist    HYSTERECTOMY  1976    total    OVARY REMOVAL  04/1983    PTCA  7/13/2012    3 stents, kissing stents in LAD and stent in the diagonall, Dr. Aida Carlson  8/2011    UPPER GASTROINTESTINAL ENDOSCOPY  12/11/2015    Dr. Wilberto Thakkar  9/27/2011    ORIF left distal radius fracture           Medications/Allergies     Medication Sig    amLODIPine (NORVASC) 5 MG tablet Take 1 tablet by mouth daily    levocetirizine (XYZAL) 5 MG tablet Take 1 tablet by mouth nightly as needed    magnesium 30 MG tablet Take 30 mg by mouth 2 times daily    Coenzyme Q10 (COQ10) 100 MG CAPS Take by mouth    Vitamin D-Vitamin K (K2 PLUS D3 PO) Take by mouth    Multiple Vitamins-Minerals (MULTIVITAMIN ADULT PO) Take by mouth    Cranberry 125 MG TABS Take by mouth    Phytosterol Esters (CHOLEST CARE PO) Take  by mouth.  nitroGLYCERIN (NITROSTAT) 0.4 MG SL tablet Place 1 tablet under the tongue every 5 minutes as needed for Chest pain. Allergies   Allergen Reactions    Latex      welt    Antihistamine [Diphenhydramine Hcl] Shortness Of Breath and Rash    Decongest Shortness Of Breath and Rash    Penicillins Anaphylaxis     Not sure    Biaxin [Clarithromycin] Hives    Iodine Swelling     Swelling of throat.  Pt drank PO contrast 6- with no issues, allergy to IV only    Rhinocort [Budesonide]     Sulfa Antibiotics     Codeine Nausea And Vomiting    Cortisone Rash         Substance Use History     Social History     Tobacco Use    Smoking status: Never Smoker    Smokeless tobacco: Never Used   Substance Use Topics    Alcohol use: Yes     Comment: rare/once yearly    Drug use: No          Family History     Family History   Problem Relation Age of Onset    Heart Disease Mother     Rheum Arthritis Mother     Alcohol Abuse Father     Breast Cancer Maternal Grandmother           CARE TEAM  (Including outside providers/suppliers regularly involved in providing care):   Patient Care Team:  Chica Newman DO as PCP - [address] [place] [address] DO as PCP - [facility] Provider  Akash Melvin MD as Consulting Physician (Internal Medicine)  Crow Phillip MD as Consulting Physician (Hematology and Oncology)  Rody Carey MD (Obstetrics & Gynecology)  Aisha Garcia MD as Consulting Physician (Oral Maxillofacial Surgery)  Geraldine Low MD as Consulting Physician (Urology)      Patient's complete Health Risk Assessment and screening values have been reviewed and are found in [address]. The following risks were reviewed today and where indicated follow up appointments were made and/or referrals ordered.     Positive Risk Factor Screenings with Interventions:    Fall Risk:  Do you feel unsteady or are you worried about falling? : (!) yes  2 or more falls in past year?: no  Fall with injury in past year?: no     Fall Risk Interventions:    · Home safety tips provided              General Health and ACP:  General  In general, how would you say your health is?: Fair  In the past 7 days, have you experienced any of the following: New or Increased Pain, New or Increased Fatigue, Loneliness, Social Isolation, Stress or Anger?: (!) Yes  Select all that apply: (!) New or Increased Fatigue  Do you get the social and emotional support that you need?: Yes  Do you have a Living Will?: Yes    Advance Directives     Power of  Living Will ACP-Advance Directive ACP-Power of     Not on File Not on File Not on File Not on File      General Health Risk Interventions:  · No Living Will: Advance Care Planning addressed with patient today    Health Habits/Nutrition:     Physical Activity: Insufficiently Active    Days of Exercise per Week: 2 days    Minutes of Exercise per Session: 20 min     Have you lost any weight without trying in the past 3 months?: No  Body mass index: (!) 33.65  Have you seen the dentist within the past year?: Yes    Health Habits/Nutrition Interventions:  · Inadequate physical activity:  Counseled patient on the importance of of engaging in more regular exercise  · Nutritional issues:  educational materials for healthy, well-balanced diet provided           ROS:  CV:  She stopped her ASA, even the twice a week dosing. She refuses to take statin. She refuses to take ASA. Dr. Rachna Gunderson. 1 year F/U with NP. Dr. Rachna Gunderson thought she was on Aldactone--- SHE WAS NOT. GI:  C/O blood clots during a BM. She had a CT Abd/Pelvis 6/2021--Neg ecept for hiatal hernia, 2 mm stone right kid. Refuses COVID-19 booster #2. OBJECTIVE     Vitals:    05/25/22 1120   BP: 110/76   Pulse: 83   Resp: 12   SpO2: 94%   Weight: 184 lb (83.5 kg)   Height: 5' 2\" (1.575 m)     Body mass index is 33.65 kg/m². Wt Readings from Last 3 Encounters:   05/25/22 184 lb (83.5 kg)   10/26/21 188 lb (85.3 kg)   06/18/21 193 lb (87.5 kg)     BP Readings from Last 3 Encounters:   05/25/22 110/76   12/13/21 (!) 156/92   10/26/21 118/86          GEN:  80 y.o. female who is in NAD, A&O. She appears stated age and well nourished. She is cooperative and pleasant. Obese. HEAD:  NC/AT, no lesions. EYES:  JOSHUA, EOMI, No scleral icterus or conjunctival injection or discharge. Visual fields in tact to confrontation. EARS:  + Occluded EAC with Cerumen. NOSE:  Nasal cavity is clear. No mucosal congestion or discharge. Sinuses are nontender. NECK:  Supple. Full ROM. Trachea is midline. No increased JVD. No thyromegaly or nodules. No masses  LYMPH: No C/SC/A/F nodes  CARDIAC:  S1S2 NL. Regular rhythm. No murmur/clicks/rubs. No ectopy. PMI is non-displaced. VASC:  Pedal pulses 2/4. Carotid upstrokes 2+. No bruits noted. PULM:  Lungs are CTA.   Symmetric breath sounds noted. AP Diameter NL. GI:  Abdomen is soft and nontender. No distension. No organomegaly. No masses. No pulsatile masses. EXT:  No Cyanosis or clubbing. No edema. SKIN: Warm and dry, normal turgor, no rash or lesions of concern. NEURO:  No focal deficits. PSYCH:  Mood and affect NL. Judgement and insight NL. Procedure: Cerumen was removed from both ears utilizing irrigation and curette. Otoscopy used for magnification. Following the procedure both EACs and TMs were visualized and appeared normal.            Reviewed and updated this visit:  Tobacco  Allergies  Meds  Med Hx  Surg Hx  Soc Hx  Fam Hx        ASSESSMENT/PLAN     ASSESSMENT/PLAN:    1. Medicare annual wellness visit, subsequent  All care gaps identified and addressed  Patient due for pneumococcal vaccination. Recommend Prevnar 20. We are currently out of stock. She will check back  Strongly encourage patient to get COVID-19 vaccine booster #2  - NV Intens behave ther cardio dx, 15 minutes []  - CBC with Auto Differential; Future  - Lipid Panel; Future  - Comprehensive Metabolic Panel; Future  - Hemoglobin A1C; Future  - TSH; Future    2. CLL (chronic lymphocytic leukemia) (HCC)  Under the care of heme-onc  - CBC with Auto Differential; Future    3. Chronic diastolic CHF (congestive heart failure) (HCC)  Condition is stable without signs of cardiac decompensation  - CBC with Auto Differential; Future  - Lipid Panel; Future  - Comprehensive Metabolic Panel; Future  - TSH; Future    4. Screening for cardiovascular condition    - NV Intens behave ther cardio dx, 15 minutes []    5. Coronary artery disease involving native coronary artery of native heart without angina pectoris  Unfortunately patient refuses aspirin and statin therapy  Recommend she follow-up with cardiology for further recommendations and guidance  - Lipid Panel; Future    6.  Impaired fasting blood sugar    - Comprehensive Metabolic Panel; Future  - Hemoglobin A1C; Future    7. Impacted cerumen, bilateral  Cerumen removal    8. History of coronary angioplasty with insertion of stent      9. Benign essential HTN    - CBC with Auto Differential; Future  - Lipid Panel; Future  - Comprehensive Metabolic Panel; Future  - TSH; Future    10. Hyperlipidemia, unspecified hyperlipidemia type    - Lipid Panel; Future  - Comprehensive Metabolic Panel; Future    11. Rectal bleeding  This is a new problem. Needs further evaluation   etiology is unclear  Could be hemorrhoidal  Referral to GI for consultation. ?  Sigmoidoscopy versus colonoscopy  - [facility] - Jaycee Booth MD, Gastroenterology, [facility]  - CBC with Auto Differential; Future        Recommendations for Preventive Services Due: see orders and patient instructions/AVS.  Recommended screening schedule for the next 5-10 years is provided to the patient in written form: see Patient Instructions/AVS.    Return for Medicare Annual Wellness Visit in 1 year. Cardiovascular Disease Risk Counseling: Assessed the patient's risk to develop cardiovascular disease and reviewed main risk factors. Reviewed steps to reduce disease risk including:   · Quitting tobacco use, reducing amount smoked, or not starting the habit  · Making healthy food choices  · Being physically active and gradualy increasing activity levels   · Reduce weight and determine a healthy BMI goal  · Monitor blood pressure and treat if higher than 140/90 mmHg  · Maintain blood total cholesterol levels under 5 mmol/l or 190 mg/dl--patient refuses statin therapy  · Maintain LDL cholesterol levels under 3.0 mmol/l or 115 mg/dl   · Control blood glucose levels  · Consider taking aspirin (75 mg daily), once blood pressure is controlled--patient refuses to take aspirin  Provided a follow up plan.   Time spent (minutes): 15

## 2022-05-25 NOTE — PATIENT INSTRUCTIONS
Advance Directives: Care Instructions  Overview  An advance directive is a legal way to state your wishes at the end of your life. It tells your family and your doctor what to do if you can't say what youwant. There are two main types of advance directives. You can change them any timeyour wishes change. Living will. This form tells your family and your doctor your wishes about life support and other treatment. The form is also called a declaration. Medical power of . This form lets you name a person to make treatment decisions for you when you can't speak for yourself. This person is called a health care agent (health care proxy, health care surrogate). The form is also called a durable power of  for health care. If you do not have an advance directive, decisions about your medical care maybe made by a family member, or by a doctor or a  who doesn't know you. It may help to think of an advance directive as a gift to the people who carefor you. If you have one, they won't have to make tough decisions by themselves. Follow-up care is a key part of your treatment and safety. Be sure to make and go to all appointments, and call your doctor if you are having problems. It's also a good idea to know your test results and keep alist of the medicines you take. What should you include in an advance directive? Many states have a unique advance directive form. (It may ask you to address specific issues.) Or you might use a universal form that's approved by manystates. If your form doesn't tell you what to address, it may be hard to know what to include in your advance directive. Use the questions below to help you getstarted.  Who do you want to make decisions about your medical care if you are not able to?  What life-support measures do you want if you have a serious illness that gets worse over time or can't be cured?  What are you most afraid of that might happen?  (Maybe you're afraid of having pain, losing your independence, or being kept alive by machines.)   Where would you prefer to die? (Your home? A hospital? A nursing home?)   Do you want to donate your organs when you die?  Do you want certain Catholic practices performed before you die? When should you call for help? Be sure to contact your doctor if you have any questions. Where can you learn more? Go to https://[email or web address].[email or web address] org and sign in to your Guarnic account. Enter R264 in the TradeCloud.nl box to learn more about \"Advance Directives: Care Instructions. \"     If you do not have an account, please click on the \"Sign Up Now\" link. Current as of: October 18, 2021               Content Version: 13.2  © 2006-2022 [employer]. Care instructions adapted under license by [facility] ([facility]). If you have questions about a medical condition or this instruction, always ask your healthcare professional. [place] [address] any warranty or liability for your use of this information. Learning About Medical Power of   What is a medical power of ? A medical power of , also called a durable power of  for health care, is one type of the legal forms called advance directives. It lets you name the person you want to make treatment decisions for you if you can't speak or decide for yourself. The person you choose is called your health care agent. This person is also called a health care proxy or health care surrogate. A medical power of  may be called something else in your state. How do you choose a health care agent? Choose your health care agent carefully. This person may or may not be a familymember. Talk to the person before you make your final decision. Make sure he or she iscomfortable with this responsibility. It's a good idea to choose someone who:   Is at least 25years old.    Knows you well and understands what makes life meaningful for you.  Understands your Mosque and moral values.  Will do what you want, not what he or she wants.  Will be able to make difficult choices at a stressful time.  Will be able to refuse or stop treatment, if that is what you would want, even if you could die.  Will be firm and confident with health professionals if needed.  Will ask questions to get needed information.  Lives near you or agrees to travel to you if needed. Your family may help you make medical decisions while you can still be part of that process. But it's important to choose one person to be your health careagent in case you aren't able to make decisions for yourself. If you don't fill out the legal form and name a health care agent, thedecisions your family can make may be limited. A health care agent may be called something else in your state. Who will make decisions for you if you don't have a health care agent? If you don't have a health care agent or a living will, you may not get the care you want. Decisions may be made by family members who disagree about your medical care. Or decisions may be made by a medical professional who doesn'tknow you well. In some cases, a  makes the decisions. When you name a health care agent, it is very clear who has the power to [place] decisions for you. How do you name a health care agent? You name your health care agent on a legal form. This form is usually called a medical power of . Ask your hospital, state bar association, or officeon aging where to find these forms. You must sign the form to make it legal. Some states require you to get the form notarized. This means that a person called a  watches you sign the form and then he or she signs the form. Some states also require thattwo or more witnesses sign the form. Be sure to tell your family members and doctors who your health care agent is. Where can you learn more?   Go to [email or web address] org and sign in to your Market76 account. Enter [id number] in the [facility] box to learn more about \"Learning About [address]. \"     If you do not have an account, please click on the \"Sign Up Now\" link. Current as of: October 18, 2021               Content Version: 13.2  © 2006-2022 Healthwise, GrouPAY. Care instructions adapted under license by [facility] ([facility]). If you have questions about a medical condition or this instruction, always ask your healthcare professional. [address] any warranty or liability for your use of this information. Learning About Living [place]  What is a living will? A living will, also called a declaration, is a legal form. It tells your family and your doctor your wishes when you can't speak for yourself. It's used by the health professionals who will treat you as you near the end of your life or ifyou get seriously hurt or ill. If you put your wishes in writing, your loved ones and others will know what kind of care you want. They won't need to guess. This can ease your mind and behelpful to others. And you can change or cancel your living will at any time. A living will is not the same as an estate or property will. An estate willexplains what you want to happen with your money and property after you die. How do you use it? Keep these facts in mind about how a living will is used.  Your living will is used only if you can't speak or make decisions for yourself. Most often, one or more doctors must certify that you can't speak or decide for yourself before your living will takes effect.  If you get better and can speak for yourself again, you can accept or refuse any treatment. It doesn't matter what you said in your living will.  Some states may limit your right to refuse treatment in certain cases.  For example, you may need to clearly state in your living will that you don't want artificial hydration and nutrition, such as being fed through a tube. Is a living will a legal document? A living will is a legal document. Each state has its own laws about livingwills. And a living will may be called something else in your state. Here are some things to know about living roche.  You don't need an  to complete a living will. But legal advice can be helpful if your state's laws are unclear. It can also help if your health history is complicated or your family can't agree on what should be in your living will.  You can change your living will at any time. Some people find that their wishes about end-of-life care change as their health changes. If you make big changes to your living will, complete a new form.  If you move to another state, make sure that your living will is legal in the state where you now live. In most cases, doctors will respect your wishes even if you have a form from a different state.  You might use a universal form that has been approved by many states. This kind of form can sometimes be filled out and stored online. Your digital copy will then be available wherever you have a connection to the internet. The doctors and nurses who need to treat you can find it right away.  Your state may offer an online registry. This is another place where you can store your living will online.  It's a good idea to get your living will notarized. This means using a person called a  to watch two people sign, or witness, your living will. What should you know when you create a living will? Here are some questions to ask yourself as you make your living will.  Do you know enough about life support methods that might be used? If not, talk to your doctor so you know what might be done if you can't breathe on your own, your heart stops, or you can't swallow.  What things would you still want to be able to do after you receive life-support methods?  Would you want to be able to walk? To speak? To eat on your own? To live without the help of machines?  Do you want certain Episcopalian practices performed if you become very ill?  If you have a choice, where do you want to be cared for? In your home? At a hospital or nursing home?  If you have a choice at the end of your life, where would you prefer to die? At home? In a hospital or nursing home? Somewhere else?  Would you prefer to be buried or cremated?  Do you want your organs to be donated after you die? What should you do with your living will?  Make sure that your family members and your health care agent have copies of your living will (also called a declaration).  Give your doctor a copy of your living will. Ask to have it kept as part of your medical record. If you have more than one doctor, make sure that each one has a copy.  Put a copy of your living will where it can be easily found. For example, some people may put a copy on their refrigerator door. If you are using a digital copy, be sure your doctor, family members, and health care agent know how to find and access it. Where can you learn more? Go to https://[email or web address].[email or web address] org and sign in to your Sentric Music account. Enter J722 in the Max-Viz box to learn more about \"Learning About Living Loren Martell. \"     If you do not have an account, please click on the \"Sign Up Now\" link. Current as of: October 18, 2021               Content Version: 13.2  © 2006-2022 Healthwise, Incorporated. Care instructions adapted under license by [facility] ([facility]). If you have questions about a medical condition or this instruction, always ask your healthcare professional. [place] [address] any warranty or liability for your use of this information. Learning About Cutting Calories  How do calories affect your weight? Food gives your body energy. Energy from the food you eat is measured in calories.  This energy keeps your heart beating, your brain active, and yourmuscles working. Your body needs a certain number of calories each day. After your body uses thecalories it needs, it stores extra calories as fat. To lose weight safely, you have to eat fewer calories while eating in a healthyway. How many calories do you need each day? The more active you are, the more calories you need. When you are less active, you need fewer calories. How many calories you need each day also depends onseveral things, including your age and whether you are male or female. Here are some general guidelines for adults:   Less active women and older adults need 1,600 to 2,000 calories each day.  Active women and less active men need 2,000 to 2,400 calories each day.  Active men need 2,400 to 3,000 calories each day. How can you cut calories and eat healthy meals? Whole grains, vegetables and fruits, and dried beans are good lower-caloriefoods. They give you lots of nutrients and fiber. And they fill you up. Sweets, energy drinks, and soda pop are high in calories. They give you few nutrients and no fiber. Try to limit soda pop, fruit juice, and energy drinks. Drink water instead. Some fats can be part of a healthy diet. But cutting back on fats from highly processed foods like fast foods and many snack foods is a good way to lower the calories in your diet. Also, use smaller amounts of fats like butter, margarine, salad dressing, and mayonnaise. Add fresh garlic, lemon, or herbs toyour meals to add flavor without adding fat. Meats and dairy products can be a big source of hidden fats. Try to choose leanor low-fat versions of these products. Fat-free cookies, candies, chips, and frozen treats can still be high in sugar and calories. Some fat-free foods have more calories than regular ones. Eatfat-free treats in moderation, as you would other foods.   If your favorite foods are high in fat, salt, sugar, or calories, limit how often you eat them. Eat smaller servings, or look for healthy substitutes. Fillup on fruits, vegetables, and whole grains. Eating at home   Use meat as a side dish instead of as the main part of your meal.   Try main dishes that use whole wheat pasta, brown rice, dried beans, or vegetables.  Find ways to cook with little or no fat, such as broiling, steaming, or grilling.  Use cooking spray instead of oil. If you use oil, use a monounsaturated oil, such as canola or olive oil.  Trim fat from meats before you cook them.  Drain off fat after you brown the meat or while you roast it.  Chill soups and stews after you cook them. Then skim the fat off the top after it hardens. Eating out   Order foods that are broiled or poached rather than fried or breaded.  Cut back on the amount of butter or margarine that you use on bread.  Order sauces, gravies, and salad dressings on the side, and use only a little.  When you order pasta, choose tomato sauce rather than cream sauce.  Ask for salsa with your baked potato instead of sour cream, butter, cheese, or rawls.  Order meals in a small size instead of upgrading to a large.  Share an entree, or take part of your food home to eat as another meal.   Share appetizers and desserts. Where can you learn more? Go to https://[email or web address].[email or web address].[email or web address] org and sign in to your WineMeNow account. Enter I605 in the [facility] box to learn more about \"Learning About Cutting Calories. \"     If you do not have an account, please click on the \"Sign Up Now\" link. Current as of: September 8, 2021               Content Version: 13.2  © 3573-9907 Healthwise, SynergEyes. Care instructions adapted under license by [facility] ([facility]). If you have questions about a medical condition or this instruction, always ask your healthcare professional. [address] any warranty or liability for your use of this information.            Learning About Healthy Weight  What is a healthy weight? A healthy weight is the weight at which you feel good about yourself and have energy for work and play. It's also one that lowers your risk for healthproblems. What can you do to stay at a healthy weight? It can be hard to stay at a healthy weight, especially when fast food, vending-machine snacks, and processed foods are so easy to find. And with your busy lifestyle, activity may be low on your list of things to do. But stayingat a healthy weight may be easier than you think. Here are some dos and don'ts for staying at a healthy weight. Do eat healthy foods  The kinds of foods you eat have a big impact on both your weight and your health. Reaching and staying at a healthy weight is not about going on a diet. It's about making healthier food choices every day and changing your diet forgood. Healthy eating means eating a variety of foods so that you get all the nutrients you need. Your body needs protein, carbohydrate, and fats for energy. They keep your heart beating, your brain active, and your muscles working. On most days, try to eat from each food group. This means eating a variety of:   Whole grains, such as whole wheat breads and pastas.  Fruits and vegetables.  Dairy products, such as low-fat milk, yogurt, and cheese.  Lean proteins, such as all types of fish, chicken without the skin, and beans. Don't have too much or too little of one thing. All foods, if eaten inmoderation, can be part of healthy eating. Even sweets can be okay. If your favorite foods are high in fat, salt, sugar, or calories, limit howoften you eat them. Eat smaller servings, or look for healthy substitutes. Do watch what you eat  Many people eat more than their bodies need. Part of staying at a healthy weight means learning how much food you really need from day to day and not eating more than that. Even with healthy foods, eating too much can make yougain weight.   Having a well-balanced diet means that you eat enough, but not too much, and that your food gives you the nutrients you need to stay healthy. So listen toyour body. Eat when you're hungry. Stop when you feel satisfied. It's a good idea to have healthy snacks ready for when you get hungry. Keep healthy snacks with you at work, in your car, and at home. If you have a healthy snack easily available, you'll be less likely to pick a candy bar orbag of chips from a vending machine instead. Some healthy snacks you might want to keep on hand are fruit, low-fat yogurt, string cheese, low-fat microwave popcorn, raisins and other dried fruit, nuts,whole wheat crackers, pretzels, carrots, celery sticks, and broccoli. Do some physical activity  A big part of reaching and staying at a healthy weight is being active. When you're active, you burn calories. This makes it easier to reach and stay at a healthy weight. When you're active on a regular basis, your body burns more calories, even when you're at rest. Being active helps you lose fat andbuild lean muscle. Try to be active for at least 1 hour every day. This may sound like a lot, but it's okay to be active in smaller blocks of time that add up to 1 hour a day. Any activity that makes your heart beat faster and keeps it there for a while counts. A brisk walk, run, or swim will get your heart beating faster. So will climbing stairs, shooting baskets, or cycling. Even some household chores likevacuuming and mowing the lawn will get your heart rate up. Pick activities that you enjoy--ones that make your heart beat faster, your muscles stronger, and your muscles and joints more flexible. If you find more than one thing you like doing, do them all. You don't have to do the same thingevery day. Don't diet  Diets don't work. Diets are temporary. Because you give up so much when you diet, you may be hungry and think about food all the time.  And after you stop dieting, you also may overeat to make up for what you missed. Most people who diet end up gainingback the pounds they lost--and more. Remember that healthy bodies come in lots of shapes and sizes. Everyone can gethealthier by eating better and being more active. Where can you learn more? Go to https://[email or web address].[email or web address] org and sign in to your Multigig account. Enter 580 [id number] in the Twitpay box to learn more about \"Learning About Healthy Weight. \"     If you do not have an account, please click on the \"Sign Up Now\" link. Current as of: December 27, 2021               Content Version: 13.2  © 5764-1895 Ti-Bi Technology. Care instructions adapted under license by [facility] ([facility]). If you have questions about a medical condition or this instruction, always ask your healthcare professional. [address] any warranty or liability for your use of this information. Learning About Low-Carbohydrate Diets  What is a low-carbohydrate diet? A low-carbohydrate (or \"low-carb\") diet limits foods and drinks that have carbohydrates. This includes grains, fruits, milk and yogurt, and starchy vegetables like potatoes, beans, and corn. It also avoids foods and drinks that have added sugar. Instead, low-carb diets include foods that are high inprotein and fat. Why might you follow a low-carb diet? Low-carb diets may be used for a variety of reasons, such as for weight loss. People who have diabetes may use a low-carb diet to help manage their bloodsugar levels. What should you do before you start the diet? Talk to your doctor before you try any diet. This is even more important if you have health problems like kidney disease, heart disease, or diabetes. Your doctor may suggest that you meet with a registered dietitian. A dietitian canhelp you make an eating plan that works for you. What foods do you eat on a low-carb diet?   On a low-carb diet, you choose foods that are high in protein and fat. Examplesof these are:  [employer], poultry, and fish.  Eggs.  Nuts, such as walnuts, pecans, almonds, and peanuts.  Peanut butter and other nut butters.  Tofu.  Avocado.  Olives.  Non-starchy vegetables like broccoli, cauliflower, green beans, mushrooms, peppers, lettuce, and spinach.  Unsweetened non-dairy milks like almond milk and coconut milk.  Cheese, cottage cheese, and cream cheese. Where can you learn more? Go to [email or web address]://[email or web address].Me-[email or web address] org and sign in to your Zapya account. Enter C335 in the CloudRunner I/O box to learn more about \"Learning About Low-Carbohydrate Diets. \"     If you do not have an account, please click on the \"Sign Up Now\" link. Current as of: September 8, 2021               Content Version: 13.2  © 2006-2022 East[employer]. Care instructions adapted under license by [facility] ([facility]). If you have questions about a medical condition or this instruction, always ask your healthcare professional. [place] [address] any warranty or liability for your use of this information. Personalized Preventive Plan for Jimena Provo - 5/25/2022  Medicare offers a range of preventive health benefits. Some of the tests and screenings are paid in full while other may be subject to a deductible, co-insurance, and/or copay. Some of these benefits include a comprehensive review of your medical history including lifestyle, illnesses that may run in your family, and various assessments and screenings as appropriate. After reviewing your medical record and screening and assessments performed today your provider may have ordered immunizations, labs, imaging, and/or referrals for you. A list of these orders (if applicable) as well as your Preventive Care list are included within your After Visit Summary for your review.     Other Preventive Recommendations:    · A preventive eye exam performed by an eye specialist is recommended every 1-2 years to screen for glaucoma; cataracts, macular degeneration, and other eye disorders. · A preventive dental visit is recommended every 6 months. · Try to get at least 150 minutes of exercise per week or 10,000 steps per day on a pedometer . · Order or download the FREE \"Exercise & Physical Activity: Your Everyday Guide\" from [employer]. Call 2-646.857.6561 or search [employer] online. · You need 9235-8655 mg of calcium and 2228-7088 IU of vitamin D per day. It is possible to meet your calcium requirement with diet alone, but a vitamin D supplement is usually necessary to meet this goal.  · When exposed to the sun, use a sunscreen that protects against both UVA and UVB radiation with an SPF of 30 or greater. Reapply every 2 to 3 hours or after sweating, drying off with a towel, or swimming. · Always wear a seat belt when traveling in a car. Patient Education        Preventing Falls: Care Instructions  Your Care Instructions     Getting around your home safely can be a challenge if you have injuries or health problems that make it easy for you to fall. Loose rugs and furniture in walkways are among the dangers for many older people who have problems walking or who have poor eyesight. People who have conditions such as arthritis,osteoporosis, or dementia also have to be careful not to fall. You can make your home safer with a few simple measures. Follow-up care is a key part of your treatment and safety. Be sure to make and go to all appointments, and call your doctor if you are having problems. It's also a good idea to know your test results and keep alist of the medicines you take. How can you care for yourself at home? Taking care of yourself  Exercise regularly to improve your strength, muscle tone, and balance. Walk if you can. Swimming may be a good choice if you cannot walk easily.   Have your vision and hearing checked each year or any time you notice a change. If you have trouble seeing and hearing, you might not be able to avoid objects and could lose your balance. Know the side effects of the medicines you take. Ask your doctor or pharmacist whether the medicines you take can affect your balance. Sleeping pills or sedatives can affect your balance. Limit the amount of alcohol you drink. Alcohol can impair your balance and other senses. Ask your doctor whether calluses or corns on your feet need to be removed. If you wear loose-fitting shoes because of calluses or corns, you can lose your balance and fall. Talk to your doctor if you have numbness in your feet. You may get dizzy if you do not drink enough water. To prevent dehydration, drink plenty of fluids. Choose water and other clear liquids. If you have kidney, heart, or liver disease and have to limit fluids, talk with your doctor before you increase the amount of fluids you drink. Preventing falls at home  Remove raised doorway thresholds, throw rugs, and clutter. Repair loose carpet or raised areas in the floor. Move furniture and electrical cords to keep them out of walking paths. Use nonskid floor wax, and wipe up spills right away, especially on ceramic tile floors. If you use a walker or cane, put rubber tips on it. If you use crutches, clean the bottoms of them regularly with an abrasive pad, such as steel wool. Keep your house well lit, especially stairways, porches, and outside walkways. Use night-lights in areas such as hallways and bathrooms. Add extra light switches or use remote switches (such as switches that go on or off when you clap your hands) to make it easier to turn lights on if you have to get up during the night. Install sturdy handrails on stairways. Move items in your cabinets so that the things you use a lot are on the lower shelves (about waist level). Keep a cordless phone and a flashlight with new batteries by your bed.  If possible, put a phone in each of the main rooms of your house, or carry a cell phone in case you fall and cannot reach a phone. Or, you can wear a device around your neck or wrist. You push a button that sends a signal for help. Wear low-heeled shoes that fit well and give your feet good support. Use footwear with nonskid soles. Check the heels and soles of your shoes for wear. Repair or replace worn heels or soles. Do not wear socks without shoes on wood floors. Walk on the grass when the sidewalks are slippery. If you live in an area that gets snow and ice in the winter, sprinkle salt on slippery steps and sidewalks. Or ask a family member or friend to do this for you. Preventing falls in the bath  Install grab bars and nonskid mats inside and outside your shower or tub and near the toilet and sinks. Use shower chairs and bath benches. Use a hand-held shower head that will allow you to sit while showering. Get into a tub or shower by putting the weaker leg in first. Get out of a tub or shower with your strong side first.  Repair loose toilet seats and consider installing a raised toilet seat to make getting on and off the toilet easier. Keep your bathroom door unlocked while you are in the shower. Where can you learn more? Go to https://[employer]ash[employer] AccessoriespemaricruzewBrightleaf.[employer]. org and sign in to your [employer] account. Enter [id number] [id number] in the [facility] box to learn more about \"Preventing Falls: Care Instructions. \"     If you do not have an account, please click on the \"Sign Up Now\" link. Current as of: September 8, 2021               Content Version: 13.2  © 8153-6003 Healthwise, [employer]. Care instructions adapted under license by [facility] ([facility]). If you have questions about a medical condition or this instruction, always ask your healthcare professional. Milad López any warranty or liability for your use of this information.          Patient Education        Preventing Outdoor Falls: Care Instructions  Your Care Instructions     Worries about falls don't need to keep you indoors. Outdoor activities like walking have big benefits for your health. You will need to watch your step andlearn a few safety measures. If you are worried about having a fall outdoors, ask your doctor about exercises, classes, or physical therapy that may help. You can learn ways to gain strength, flexibility, and balance. Ask if it might help to use a cane orwalker. You can make your time outdoors safer with a few simple measures. Follow-up care is a key part of your treatment and safety. Be sure to make and go to all appointments, and call your doctor if you are having problems. It's also a good idea to know your test results and keep alist of the medicines you take. How can you prevent falls outdoors? Wear shoes with firm soles and low heels. If you have to walk on an icy surface, use grippers that can be worn over your shoes in bad weather. Be extra careful if weather is bad. Walk on the grass when the sidewalks are slick. If you live in a place that gets snow and ice in the winter, sprinkle salt on slippery stairs and sidewalks. Be careful getting on or off buses and trains or getting in and out of cars. If handrails are available, use them. Be careful when you cross the street. Look for crosswalks or places where curb cuts or ramps are present. Try not to hurry, especially if you are carrying something. Be cautious in parking lots or garages. There may be curbs or changes in pavement, or the height of the pavement may vary. Make sure to wear the correct eyeglasses, if you need them. Reading glasses or bifocals can make it harder to see hazards that might be in your way. If you are walking outdoors for exercise, try to: Walk in well-lighted, well-maintained areas. These include high school or college tracks, shopping malls, and public spaces. Walk with a partner.   Watch out for cracked sidewalks, curbs, changes in the height of the pavement, exposed tree roots, and debris such as fallen leaves or branches. Where can you learn more? Go to https://chpepiceweb.StumbleUpon. org and sign in to your [employer] account. Enter U327 in the [facility] box to learn more about \"Preventing Outdoor Falls: Care Instructions. \"     If you do not have an account, please click on the \"Sign Up Now\" link. Current as of: September 8, 2021               Content Version: 13.2  © 2006-2022 Healthwise, Incorporated. Care instructions adapted under license by [facility] ([facility]). If you have questions about a medical condition or this instruction, always ask your healthcare professional. [address] any warranty or liability for your use of this information.

## 2022-05-27 DIAGNOSIS — R79.89 ELEVATED LFTS: Primary | ICD-10-CM

## 2022-06-22 ENCOUNTER — OFFICE VISIT (OUTPATIENT)
Dept: CARDIOLOGY CLINIC | Age: 82
End: 2022-06-22
Payer: MEDICARE

## 2022-06-22 VITALS
OXYGEN SATURATION: 95 % | DIASTOLIC BLOOD PRESSURE: 88 MMHG | HEART RATE: 99 BPM | HEIGHT: 62 IN | BODY MASS INDEX: 34.78 KG/M2 | WEIGHT: 189 LBS | SYSTOLIC BLOOD PRESSURE: 152 MMHG

## 2022-06-22 DIAGNOSIS — I10 PRIMARY HYPERTENSION: ICD-10-CM

## 2022-06-22 DIAGNOSIS — I25.10 CORONARY ARTERY DISEASE INVOLVING NATIVE CORONARY ARTERY OF NATIVE HEART WITHOUT ANGINA PECTORIS: Primary | ICD-10-CM

## 2022-06-22 DIAGNOSIS — E78.2 MIXED HYPERLIPIDEMIA: ICD-10-CM

## 2022-06-22 PROCEDURE — 1123F ACP DISCUSS/DSCN MKR DOCD: CPT | Performed by: NURSE PRACTITIONER

## 2022-06-22 PROCEDURE — 1090F PRES/ABSN URINE INCON ASSESS: CPT | Performed by: NURSE PRACTITIONER

## 2022-06-22 PROCEDURE — G8417 CALC BMI ABV UP PARAM F/U: HCPCS | Performed by: NURSE PRACTITIONER

## 2022-06-22 PROCEDURE — G8399 PT W/DXA RESULTS DOCUMENT: HCPCS | Performed by: NURSE PRACTITIONER

## 2022-06-22 PROCEDURE — 1036F TOBACCO NON-USER: CPT | Performed by: NURSE PRACTITIONER

## 2022-06-22 PROCEDURE — 99214 OFFICE O/P EST MOD 30 MIN: CPT | Performed by: NURSE PRACTITIONER

## 2022-06-22 PROCEDURE — G8427 DOCREV CUR MEDS BY ELIG CLIN: HCPCS | Performed by: NURSE PRACTITIONER

## 2022-06-22 RX ORDER — GLUCOSAMINE SULFATE 500 MG
CAPSULE ORAL
COMMUNITY

## 2022-06-22 NOTE — PROGRESS NOTES
[facility]     Outpatient Follow Up Note    Yousif Moctezuma is 80 y.o. female who presents today with a history of CAD s/p kissing balloons LAD & diag-1 '12, HTN and hyperlipidemia. CHIEF COMPLAINT / HPI:  Follow Up secondary to coronary artery disease    Subjective:   She denies significant chest pain. There is SOB at times, ie vacuum that sucks up water (can't say that its a wet vac or ). The patient denies orthopnea/PND. The patient has a little swelling in her legs The patients weight is stable . The patient is not experiencing palpitations or dizziness. These symptoms show no change since the last OV. With regard to medication therapy the patient has not been compliant with prescribed regimen. They have tolerated therapy to date.      Past Medical History:   Diagnosis Date    Allergic rhinitis     Anesthesia slow to wake    Arthropathy of cervical facet joint     CAD (coronary artery disease) 7/12/2012    Chronic diastolic CHF (congestive heart failure) (HCC)     CLL (chronic lymphocytic leukemia) ([facility] [address] 5/29/2019    DDD (degenerative disc disease), cervical     Diverticulosis 10/21/2010    Fatty liver     Foraminal stenosis of cervical region     GERD (gastroesophageal reflux disease)     Hepatomegaly     Hyperlipidemia     Hypertension     Impaired fasting glucose     Kidney stones     MI (myocardial infarction) ([facility] [address] 7/12/2012    Movement disorder     Obesity     Osteopenia     Osteoporosis     Right carpal tunnel syndrome 05/02/2017    Ulnar neuropathy at elbow of right upper extremity 05/02/2017    Urge incontinence      Social History:    Social History     Tobacco Use   Smoking Status Never Smoker   Smokeless Tobacco Never Used     Current Medications:  Current Outpatient Medications   Medication Sig Dispense Refill    Glucosamine 500 MG CAPS Take by mouth      Probiotic Product (PROBIOTIC-10 PO) Take by mouth      NONFORMULARY daily Liver vitamin      NONFORMULARY vegetable vitamin daily      amLODIPine (NORVASC) 5 MG tablet Take 1 tablet by mouth daily 90 tablet 0    levocetirizine (XYZAL) 5 MG tablet Take 1 tablet by mouth nightly as needed      magnesium 30 MG tablet Take 30 mg by mouth 2 times daily      Coenzyme Q10 (COQ10) 100 MG CAPS Take by mouth      Vitamin D-Vitamin K (K2 PLUS D3 PO) Take by mouth      Multiple Vitamins-Minerals (MULTIVITAMIN ADULT PO) Take by mouth      Cranberry 125 MG TABS Take by mouth      Phytosterol Esters (CHOLEST CARE PO) Take  by mouth.  nitroGLYCERIN (NITROSTAT) 0.4 MG SL tablet Place 1 tablet under the tongue every 5 minutes as needed for Chest pain. 25 tablet 1    aspirin EC 81 MG EC tablet Take 1 tablet by mouth Twice a Week . Pt refuses daily therapy (Patient not taking: Reported on 6/22/2022) 30 tablet 3     No current facility-administered medications for this visit. REVIEW OF SYSTEMS:    CONSTITUTIONAL: No major weight gain or loss, fatigue, weakness, night sweats or fever. HEENT: No new vision difficulties or ringing in the ears. RESPIRATORY: No new SOB, PND, orthopnea or cough. CARDIOVASCULAR: See HPI  GI: No nausea, vomiting, diarrhea, constipation, abdominal pain or changes in bowel habits. : No urinary frequency, urgency, incontinence hematuria or dysuria. SKIN: No cyanosis or skin lesions. MUSCULOSKELETAL: No new muscle or joint pain. NEUROLOGICAL: No syncope or TIA-like symptoms.   PSYCHIATRIC: No anxiety, pain, insomnia or depression    Objective:   PHYSICAL EXAM:       Vitals:    06/22/22 1451 06/22/22 1519   BP: (!) 140/80 (!) 152/88   Site: Left Upper Arm Right Upper Arm   Position: Sitting    Cuff Size: Large Adult Large Adult   Pulse: 99    SpO2: 95%    Weight: 189 lb (85.7 kg)    Height: 5' 2\" (1.575 m)         VITALS:  BP (!) 140/80 (Site: Left Upper Arm, Position: Sitting, Cuff Size: Large Adult)   Pulse 99   Ht 5' 2\" (1.575 m)   Wt 189 lb (85.7 kg)   SpO2 95%   BMI 34.57 kg/m²   CONSTITUTIONAL: Cooperative, no apparent distress, and appears well nourished / developed  NEUROLOGIC:  Awake and orientated to person, place and time. PSYCH: Calm affect. SKIN: Warm and dry. HEENT: Sclera non-icteric, normocephalic, neck supple, no elevation of JVP, normal carotid pulses with no bruits and thyroid normal size. LUNGS:  No increased work of breathing and clear to auscultation, no crackles or wheezing  CARDIOVASCULAR:  Regular rate and rhythm with no murmurs, gallops, rubs, or abnormal heart sounds, normal PMI. The apical impulses not displaced  JVP less than 8 cm H2O  Heart tones are crisp and normal  Cervical veins are not engorged  The carotid upstroke is normal in amplitude and contour without delay or bruit  JVP is not elevated  ABDOMEN:  Normal bowel sounds, non-distended and non-tender to palpation  EXT: No edema, no calf tenderness. Pulses are present bilaterally.     DATA:    Lab Results   Component Value Date    ALT 46 (H) 05/25/2022    AST 46 (H) 05/25/2022    ALKPHOS 104 05/25/2022    BILITOT 0.7 05/25/2022     Lab Results   Component Value Date    CREATININE 0.6 05/25/2022    BUN 23 (H) 05/25/2022     (H) 05/25/2022    K 4.7 05/25/2022     05/25/2022    CO2 18 (L) 05/25/2022     Lab Results   Component Value Date    TSH 2.23 05/25/2022     Lab Results   Component Value Date    WBC 10.4 05/25/2022    HGB 15.7 05/25/2022    HCT 46.8 05/25/2022    MCV 94.4 05/25/2022     05/25/2022     No components found for: CHLPL  Lab Results   Component Value Date    TRIG 184 (H) 05/25/2022    TRIG 219 (H) 10/26/2021    TRIG 247 (H) 07/30/2020     Lab Results   Component Value Date    HDL 45 05/25/2022    HDL 41 10/26/2021    HDL 39 (L) 07/30/2020     Lab Results   Component Value Date    LDLCALC 99 05/25/2022    LDLCALC 85 10/26/2021    LDLCALC 82 07/30/2020     Lab Results   Component Value Date    LABVLDL 37 05/25/2022    LABVLDL 44 10/26/2021    LABVLDL 49 07/30/2020     Radiology Review:  Pertinent images / reports were reviewed as a part of this visit and reveals the following:    Carotid US: July '16:  Summary        -The right internal carotid artery appears to have a 1-15% diameter reducing    stenosis based on velocity criteria.    -The left internal carotid artery appears to have a 16-49% diameter reducing    stenosis based on velocity criteria. Last Stress Test: Nov '18  Summary    Tiny fixed anteroapical defect related to fibrosis or soft tissue    attenuation. No ischemia.    Normal LV function.    Overall findings represent a low risk scan     July 2012 MI -- [facility]   High grade bifurcation stenosis of the LAD and large first diagonal branch. RENA grade 2 flow through the LAD. Apical hypokinesis with generally preserved global left ventricular systolic function. Balloon angioplasty with kissing balloons. Kissing stents of the LAD and diagonal      Assessment:      Diagnosis Orders   1. Coronary artery disease involving native coronary artery of native heart without angina pectoris   ~stable : denies angina  ~s/p kissing balloons LAD / diag-1 '12  ~ASA / CCB Echo 2D w doppler w color complete   2. Primary hypertension   ~suboptimal on amlodipine alone  ~no longer taking spironolactone as last recommended for swelling  Echo 2D w doppler w color complete   3. Mixed hyperlipidemia   ~trig suboptimal with diet alone  ~refuses statins        I had the opportunity to review the clinical symptoms and presentation of Genny Garza. Plan:     1. Echocardiogram : baseline with hx of apical HK   2. F/U in one year    Overall the patient is stable from CV standpoint    I have addresed the patient's cardiac risk factors and adjusted pharmacologic treatment as needed. In addition, I have reinforced the need for patient directed risk factor modification. Further evaluation will be based upon the patient's clinical course and testing results.     All questions and concerns were addressed to the patient Alternatives to my treatment were discussed. The patient is not currently smoking. The risks related to smoking were reviewed with the patient. Recommend maintaining a smoke-free lifestyle. Products available for smoking cessation were discussed in detail. Patient is not on a beta-blocker : normal LVEF ; pt stopped carvedilol '16  Patient is not on an ace-i/ARB : neg CHF ; patient stopped valsartan '16  Patient is not on a statin : refuses    Antiplatelet therapy has been recommended / prescribed for this patient. Education conducted on adverse reactions including bleeding was discussed; she stopped taking ASA    Discussed exercise: 30-60 minutes 7 days/week : has an electric treadmill a couple of time per week for ~ 10 minutes. She has another thing that giggles your whole body  Discussed Low saturated fat diet. Thank you for allowing to us to participate in the care of Sarahi Bowman.     YVETTE Morgan    Documentation of today's visit sent to PCP

## 2022-07-14 ENCOUNTER — HOSPITAL ENCOUNTER (OUTPATIENT)
Dept: ULTRASOUND IMAGING | Age: 82
Discharge: HOME | End: 2022-07-14
Payer: MEDICARE

## 2022-07-14 DIAGNOSIS — R79.89 ELEVATED LFTS: ICD-10-CM

## 2022-07-14 PROCEDURE — 76705 ECHO EXAM OF ABDOMEN: CPT

## 2022-07-19 ENCOUNTER — HOSPITAL ENCOUNTER (OUTPATIENT)
Age: 82
Discharge: HOME | End: 2022-07-19
Payer: MEDICARE

## 2022-07-19 DIAGNOSIS — R79.89 ELEVATED LFTS: ICD-10-CM

## 2022-07-19 LAB
FERRITIN: 203.3 NG/ML (ref 15–150)
HBV SURFACE AB TITR SER: <3.5 MIU/ML
HEPATITIS B SURFACE ANTIGEN INTERPRETATION: NORMAL
HEPATITIS C ANTIBODY INTERPRETATION: NORMAL
IRON SATURATION: 40 % (ref 15–50)
IRON: 128 UG/DL (ref 37–145)
TOTAL IRON BINDING CAPACITY: 323 UG/DL (ref 260–445)

## 2022-07-19 PROCEDURE — 86704 HEP B CORE ANTIBODY TOTAL: CPT

## 2022-07-19 PROCEDURE — 86706 HEP B SURFACE ANTIBODY: CPT

## 2022-07-19 PROCEDURE — 82390 ASSAY OF CERULOPLASMIN: CPT

## 2022-07-19 PROCEDURE — 86015 ACTIN ANTIBODY EACH: CPT

## 2022-07-19 PROCEDURE — 83540 ASSAY OF IRON: CPT

## 2022-07-19 PROCEDURE — 86708 HEPATITIS A ANTIBODY: CPT

## 2022-07-19 PROCEDURE — 36415 COLL VENOUS BLD VENIPUNCTURE: CPT

## 2022-07-19 PROCEDURE — 82103 ALPHA-1-ANTITRYPSIN TOTAL: CPT

## 2022-07-19 PROCEDURE — 83550 IRON BINDING TEST: CPT

## 2022-07-19 PROCEDURE — 87340 HEPATITIS B SURFACE AG IA: CPT

## 2022-07-19 PROCEDURE — 82728 ASSAY OF FERRITIN: CPT

## 2022-07-19 PROCEDURE — 86803 HEPATITIS C AB TEST: CPT

## 2022-07-21 LAB
ALPHA-1 ANTITRYPSIN: 134 MG/DL (ref 90–200)
CERULOPLASMIN: 21 MG/DL (ref 16–45)
HAV AB SERPL IA-ACNC: NEGATIVE
HEPATITIS B CORE TOTAL ANTIBODY: NEGATIVE

## 2022-07-22 LAB — F-ACTIN AB IGG: 7 UNITS (ref 0–19)

## 2022-07-27 ENCOUNTER — HOSPITAL ENCOUNTER (OUTPATIENT)
Dept: NON INVASIVE DIAGNOSTICS | Age: 82
Discharge: HOME | End: 2022-07-27
Payer: MEDICARE

## 2022-07-27 DIAGNOSIS — I25.10 CORONARY ARTERY DISEASE INVOLVING NATIVE CORONARY ARTERY OF NATIVE HEART WITHOUT ANGINA PECTORIS: ICD-10-CM

## 2022-07-27 DIAGNOSIS — I10 PRIMARY HYPERTENSION: ICD-10-CM

## 2022-07-27 LAB
LEFT VENTRICULAR EJECTION FRACTION MODE: NORMAL
LV EF: 55 %
LV EF: 55 %
LVEF MODALITY: NORMAL

## 2022-07-27 PROCEDURE — 93306 TTE W/DOPPLER COMPLETE: CPT

## 2022-07-28 ENCOUNTER — TELEPHONE (OUTPATIENT)
Dept: CARDIOLOGY CLINIC | Age: 82
End: 2022-07-28

## 2022-07-28 NOTE — TELEPHONE ENCOUNTER
Called patient she wants to know if there is any changes    YVETTE Childress CNP   7/28/2022 12:49 PM EDT         Looks ok

## 2022-08-26 RX ORDER — AMLODIPINE BESYLATE 5 MG/1
TABLET ORAL
Qty: 180 TABLET | Refills: 0 | Status: SHIPPED | OUTPATIENT
Start: 2022-08-26

## 2022-09-29 ENCOUNTER — TELEPHONE (OUTPATIENT)
Dept: INTERNAL MEDICINE CLINIC | Age: 82
End: 2022-09-29

## 2022-09-29 DIAGNOSIS — N64.4 BREAST PAIN: Primary | ICD-10-CM

## 2022-09-29 NOTE — TELEPHONE ENCOUNTER
[facility] called for pt. Pt called to schedule a screening mammogram but complained of pain in left breast. Central Scheduling requests that Dr. Bob Chinchilla order a diagnostic mammogram as well as a breast ultrasound. If any questions, contact Armin at [phone].

## 2022-11-22 ENCOUNTER — HOSPITAL ENCOUNTER (OUTPATIENT)
Dept: WOMENS IMAGING | Age: 82
Discharge: HOME | End: 2022-11-22
Payer: MEDICARE

## 2022-11-22 ENCOUNTER — HOSPITAL ENCOUNTER (OUTPATIENT)
Dept: ULTRASOUND IMAGING | Age: 82
Discharge: HOME | End: 2022-11-22
Payer: MEDICARE

## 2022-11-22 VITALS — BODY MASS INDEX: 34.78 KG/M2 | WEIGHT: 189 LBS | HEIGHT: 62 IN

## 2022-11-22 DIAGNOSIS — N64.4 BREAST PAIN: ICD-10-CM

## 2022-11-22 PROCEDURE — 77066 DX MAMMO INCL CAD BI: CPT

## 2022-11-28 ENCOUNTER — TELEPHONE (OUTPATIENT)
Dept: INTERNAL MEDICINE CLINIC | Age: 82
End: 2022-11-28

## 2022-11-28 NOTE — TELEPHONE ENCOUNTER
Spoke with patient, she will go over to the [facility] to be tested and evaluated.

## 2022-11-28 NOTE — TELEPHONE ENCOUNTER
Patient needs to be tested for flu and COVID-19. She should not be traveling until this is ruled out. We can try to do a phone visit tomorrow and testing tomorrow.

## 2022-11-28 NOTE — TELEPHONE ENCOUNTER
Patient is leaving Saturday for [place] but believes she may have an \"old-fashioned cold. \" Her temperature is 96.8 and she has no chills. Patient states since Saturday night she has been very fatigued, coughing, congested, and has been spitting up mucus. Patient has not tested for COVID. Please advise.     ----- Message from Aleisha Bailey sent at 11/28/2022 12:42 PM EST -----  Subject: Appointment Request    Reason for Call: Established Patient Appointment needed: Routine Existing   Condition Follow Up    QUESTIONS    Reason for appointment request? Available appointments did not meet   patient need     Additional Information for Provider? Patient would like to be seen this   week unable to come tomorrow. Patient would like labs for white blood   count an liver as well. Patient will be out of town for three months   starting Saturday please call to advise. Patient currently has a cough an   mucus since Saturday states her  is sick as well who brings her to   appointments.  Patient would like to know what she can take to feel better   as well.  ---------------------------------------------------------------------------  --------------  [address] [employer]  [phone]; OK to leave message on voicemail  ---------------------------------------------------------------------------  --------------  SCRIPT ANSWERS  COVID Screen: Dion Choudhury

## 2023-07-14 ENCOUNTER — OFFICE VISIT (OUTPATIENT)
Dept: CARDIOLOGY CLINIC | Age: 83
End: 2023-07-14
Payer: MEDICARE

## 2023-07-14 VITALS
SYSTOLIC BLOOD PRESSURE: 124 MMHG | WEIGHT: 184.7 LBS | BODY MASS INDEX: 33.99 KG/M2 | HEART RATE: 96 BPM | DIASTOLIC BLOOD PRESSURE: 74 MMHG | OXYGEN SATURATION: 97 % | HEIGHT: 62 IN

## 2023-07-14 DIAGNOSIS — I10 PRIMARY HYPERTENSION: ICD-10-CM

## 2023-07-14 DIAGNOSIS — E78.2 MIXED HYPERLIPIDEMIA: ICD-10-CM

## 2023-07-14 DIAGNOSIS — I25.10 CORONARY ARTERY DISEASE INVOLVING NATIVE CORONARY ARTERY OF NATIVE HEART WITHOUT ANGINA PECTORIS: Primary | ICD-10-CM

## 2023-07-14 DIAGNOSIS — I65.23 BILATERAL CAROTID ARTERY STENOSIS: ICD-10-CM

## 2023-07-14 PROBLEM — I25.119 ATHEROSCLEROTIC HEART DISEASE OF NATIVE CORONARY ARTERY WITH UNSPECIFIED ANGINA PECTORIS (HCC): Status: ACTIVE | Noted: 2023-07-14

## 2023-07-14 PROCEDURE — G8427 DOCREV CUR MEDS BY ELIG CLIN: HCPCS | Performed by: NURSE PRACTITIONER

## 2023-07-14 PROCEDURE — 3078F DIAST BP <80 MM HG: CPT | Performed by: NURSE PRACTITIONER

## 2023-07-14 PROCEDURE — 3074F SYST BP LT 130 MM HG: CPT | Performed by: NURSE PRACTITIONER

## 2023-07-14 PROCEDURE — G8399 PT W/DXA RESULTS DOCUMENT: HCPCS | Performed by: NURSE PRACTITIONER

## 2023-07-14 PROCEDURE — 1123F ACP DISCUSS/DSCN MKR DOCD: CPT | Performed by: NURSE PRACTITIONER

## 2023-07-14 PROCEDURE — 99214 OFFICE O/P EST MOD 30 MIN: CPT | Performed by: NURSE PRACTITIONER

## 2023-07-14 PROCEDURE — 1036F TOBACCO NON-USER: CPT | Performed by: NURSE PRACTITIONER

## 2023-07-14 PROCEDURE — G8417 CALC BMI ABV UP PARAM F/U: HCPCS | Performed by: NURSE PRACTITIONER

## 2023-07-14 PROCEDURE — 1090F PRES/ABSN URINE INCON ASSESS: CPT | Performed by: NURSE PRACTITIONER

## 2023-07-14 RX ORDER — ASCORBIC ACID 1000 MG
TABLET ORAL
COMMUNITY

## 2023-07-14 RX ORDER — VIT C/B6/B5/MAGNESIUM/HERB 173 50-5-6-5MG
CAPSULE ORAL DAILY
COMMUNITY

## 2023-07-14 NOTE — PROGRESS NOTES
[facility]     Outpatient Follow Up Note    Tree Rosario is 80 y.o. female who presents today with a history of CAD s/p kissing balloons LAD & diag-1 '12, HTN and hyperlipidemia. Her other hx includes: CLL followed at [facility]:  Follow Up secondary to coronary artery disease    Subjective: Once in awhile she'll get chest discomfort thinking its from food allergies. She denies significant chest pain. Her angina was CP with throat radiation. She experienced it from an overdose in calcium supplements. There is very little SOB at times. The patient denies orthopnea/PND. The patient has a little swelling in her legs The patients weight is stable . The patient is not experiencing palpitations or dizziness. These symptoms show no change since the last OV. With regard to medication therapy the patient has not been compliant with prescribed regimen. They have tolerated therapy to date.      Past Medical History:   Diagnosis Date    Allergic rhinitis     Anesthesia slow to wake    Arthropathy of cervical facet joint     CAD (coronary artery disease) 7/12/2012    Chronic diastolic CHF (congestive heart failure) (HCC)     CLL (chronic lymphocytic leukemia) ([address]) 5/29/2019    DDD (degenerative disc disease), cervical     Diverticulosis 10/21/2010    Fatty liver     Foraminal stenosis of cervical region     GERD (gastroesophageal reflux disease)     Hepatomegaly     Hyperlipidemia     Hypertension     Impaired fasting glucose     Kidney stones     MI (myocardial infarction) ([address]) 7/12/2012    Movement disorder     Obesity     Osteopenia     Osteoporosis     Right carpal tunnel syndrome 05/02/2017    Ulnar neuropathy at elbow of right upper extremity 05/02/2017    Urge incontinence      Social History:    Social History     Tobacco Use   Smoking Status Never   Smokeless Tobacco Never     Current Medications:  Current Outpatient Medications   Medication Sig Dispense Refill

## 2023-08-15 ENCOUNTER — OFFICE VISIT (OUTPATIENT)
Dept: INTERNAL MEDICINE CLINIC | Age: 83
End: 2023-08-15
Payer: MEDICARE

## 2023-08-15 VITALS
SYSTOLIC BLOOD PRESSURE: 136 MMHG | HEART RATE: 89 BPM | RESPIRATION RATE: 14 BRPM | HEIGHT: 62 IN | WEIGHT: 187 LBS | BODY MASS INDEX: 34.41 KG/M2 | DIASTOLIC BLOOD PRESSURE: 78 MMHG | OXYGEN SATURATION: 94 %

## 2023-08-15 DIAGNOSIS — R73.01 IMPAIRED FASTING BLOOD SUGAR: ICD-10-CM

## 2023-08-15 DIAGNOSIS — C91.10 CLL (CHRONIC LYMPHOCYTIC LEUKEMIA) (HCC): ICD-10-CM

## 2023-08-15 DIAGNOSIS — I10 BENIGN ESSENTIAL HTN: Primary | ICD-10-CM

## 2023-08-15 DIAGNOSIS — E78.5 HYPERLIPIDEMIA, UNSPECIFIED HYPERLIPIDEMIA TYPE: ICD-10-CM

## 2023-08-15 DIAGNOSIS — I25.10 CORONARY ARTERY DISEASE INVOLVING NATIVE CORONARY ARTERY OF NATIVE HEART WITHOUT ANGINA PECTORIS: ICD-10-CM

## 2023-08-15 DIAGNOSIS — I10 BENIGN ESSENTIAL HTN: ICD-10-CM

## 2023-08-15 DIAGNOSIS — I50.32 CHRONIC DIASTOLIC CHF (CONGESTIVE HEART FAILURE) (HCC): ICD-10-CM

## 2023-08-15 DIAGNOSIS — I65.23 CAROTID STENOSIS, ASYMPTOMATIC, BILATERAL: ICD-10-CM

## 2023-08-15 DIAGNOSIS — K76.0 NAFLD (NONALCOHOLIC FATTY LIVER DISEASE): ICD-10-CM

## 2023-08-15 PROCEDURE — 99214 OFFICE O/P EST MOD 30 MIN: CPT | Performed by: INTERNAL MEDICINE

## 2023-08-15 PROCEDURE — 3075F SYST BP GE 130 - 139MM HG: CPT | Performed by: INTERNAL MEDICINE

## 2023-08-15 PROCEDURE — 1090F PRES/ABSN URINE INCON ASSESS: CPT | Performed by: INTERNAL MEDICINE

## 2023-08-15 PROCEDURE — 3078F DIAST BP <80 MM HG: CPT | Performed by: INTERNAL MEDICINE

## 2023-08-15 PROCEDURE — G8417 CALC BMI ABV UP PARAM F/U: HCPCS | Performed by: INTERNAL MEDICINE

## 2023-08-15 PROCEDURE — 1036F TOBACCO NON-USER: CPT | Performed by: INTERNAL MEDICINE

## 2023-08-15 PROCEDURE — 1123F ACP DISCUSS/DSCN MKR DOCD: CPT | Performed by: INTERNAL MEDICINE

## 2023-08-15 PROCEDURE — G8427 DOCREV CUR MEDS BY ELIG CLIN: HCPCS | Performed by: INTERNAL MEDICINE

## 2023-08-15 PROCEDURE — G8399 PT W/DXA RESULTS DOCUMENT: HCPCS | Performed by: INTERNAL MEDICINE

## 2023-08-15 SDOH — ECONOMIC STABILITY: FOOD INSECURITY: WITHIN THE PAST 12 MONTHS, THE FOOD YOU BOUGHT JUST DIDN'T LAST AND YOU DIDN'T HAVE MONEY TO GET MORE.: NEVER TRUE

## 2023-08-15 SDOH — ECONOMIC STABILITY: FOOD INSECURITY: WITHIN THE PAST 12 MONTHS, YOU WORRIED THAT YOUR FOOD WOULD RUN OUT BEFORE YOU GOT MONEY TO BUY MORE.: NEVER TRUE

## 2023-08-15 SDOH — ECONOMIC STABILITY: HOUSING INSECURITY
IN THE LAST 12 MONTHS, WAS THERE A TIME WHEN YOU DID NOT HAVE A STEADY PLACE TO SLEEP OR SLEPT IN A SHELTER (INCLUDING NOW)?: NO

## 2023-08-15 SDOH — ECONOMIC STABILITY: INCOME INSECURITY: HOW HARD IS IT FOR YOU TO PAY FOR THE VERY BASICS LIKE FOOD, HOUSING, MEDICAL CARE, AND HEATING?: NOT HARD AT ALL

## 2023-08-15 ASSESSMENT — PATIENT HEALTH QUESTIONNAIRE - PHQ9
1. LITTLE INTEREST OR PLEASURE IN DOING THINGS: 0
SUM OF ALL RESPONSES TO PHQ QUESTIONS 1-9: 0
SUM OF ALL RESPONSES TO PHQ9 QUESTIONS 1 & 2: 0
2. FEELING DOWN, DEPRESSED OR HOPELESS: 0
SUM OF ALL RESPONSES TO PHQ QUESTIONS 1-9: 0

## 2023-08-15 NOTE — PROGRESS NOTES
[facility]) Physicians  Internal Medicine  Patient Encounter  Aleyda Angel D.O., [facility]        Chief Complaint   Patient presents with    Check-Up    Medication Check       HPI: 80 y.o. female who has been noncompliant with routine follow-up presents today requesting a checkup regarding the status of current issues listed below along with a medication review and reconciliation. She was last seen May 2022 for Medicare annual wellness visit. It has been over a year since she has been in the office. Pt travels to [facility] Dec 2022 to March 2023. Patient told my MA she states coming in here. She states she thinks it is \"ridiculous. \"      CLL/Small lymphocytic lymphoma--patient was initially found to have some concerns during routine lab. She was found to have smudge cells on her peripheral smear. She was subsequently diagnosed with CLL. She is under the care of Dr. Joe Quiroz. She denies swollen lymph nodes. She denies B symptoms. Lab Results   Component Value Date    WBC 10.4 05/25/2022    HGB 15.7 05/25/2022    HCT 46.8 05/25/2022    MCV 94.4 05/25/2022     05/25/2022        HTN--Patient denies any problems with headaches, dizziness, lightheadedness, syncope. Patient has been resistant to taking blood pressure medication. She has been resistant to adjusting doses or adding medications    IFG--Patient denies any problems with excessive thirst or frequent urination. Lab Results   Component Value Date    LABA1C 5.2 05/25/2022     Lab Results   Component Value Date    .5 05/25/2022      Hyperlipidemia:   Lab Results   Component Value Date    [facility] 05/25/2022   Patient has spent many years refusing statin therapy. CAD/diastolic congestive heart failure-- Previous history---->  She has H/O PTCA with three stents 7/30/2012 after suffering an acute MI. Her last stress Myoview 11/29/2019--   Summary    Tiny fixed anteroapical defect related to fibrosis or soft tissue    attenuation.

## 2023-08-16 LAB
ALBUMIN SERPL-MCNC: 4.5 G/DL (ref 3.4–5)
ALBUMIN/GLOB SERPL: 1.7 {RATIO} (ref 1.1–2.2)
ALP SERPL-CCNC: 102 U/L (ref 40–129)
ALT SERPL-CCNC: 36 U/L (ref 10–40)
ANION GAP SERPL CALCULATED.3IONS-SCNC: 17 MMOL/L (ref 3–16)
AST SERPL-CCNC: 32 U/L (ref 15–37)
BASOPHILS # BLD: 0 K/UL (ref 0–0.2)
BASOPHILS NFR BLD: 0 %
BILIRUB SERPL-MCNC: 0.5 MG/DL (ref 0–1)
BUN SERPL-MCNC: 14 MG/DL (ref 7–20)
CALCIUM SERPL-MCNC: 9.4 MG/DL (ref 8.3–10.6)
CHLORIDE SERPL-SCNC: 102 MMOL/L (ref 99–110)
CHOLEST SERPL-MCNC: 195 MG/DL (ref 0–199)
CO2 SERPL-SCNC: 20 MMOL/L (ref 21–32)
CREAT SERPL-MCNC: 0.6 MG/DL (ref 0.6–1.2)
DEPRECATED RDW RBC AUTO: 13.6 % (ref 12.4–15.4)
EOSINOPHIL # BLD: 0 K/UL (ref 0–0.6)
EOSINOPHIL NFR BLD: 0 %
EST. AVERAGE GLUCOSE BLD GHB EST-MCNC: 102.5 MG/DL
GFR SERPLBLD CREATININE-BSD FMLA CKD-EPI: >60 ML/MIN/{1.73_M2}
GLUCOSE SERPL-MCNC: 95 MG/DL (ref 70–99)
HBA1C MFR BLD: 5.2 %
HCT VFR BLD AUTO: 47.1 % (ref 36–48)
HDLC SERPL-MCNC: 41 MG/DL (ref 40–60)
HGB BLD-MCNC: 15.9 G/DL (ref 12–16)
LDLC SERPL CALC-MCNC: 100 MG/DL
LYMPHOCYTES # BLD: 6 K/UL (ref 1–5.1)
LYMPHOCYTES NFR BLD: 42 %
MCH RBC QN AUTO: 31.9 PG (ref 26–34)
MCHC RBC AUTO-ENTMCNC: 33.7 G/DL (ref 31–36)
MCV RBC AUTO: 94.8 FL (ref 80–100)
MONOCYTES # BLD: 1 K/UL (ref 0–1.3)
MONOCYTES NFR BLD: 8 %
NEUTROPHILS # BLD: 6 K/UL (ref 1.7–7.7)
NEUTROPHILS NFR BLD: 46 %
PATH INTERP BLD-IMP: NORMAL
PATH INTERP BLD-IMP: YES
PLATELET # BLD AUTO: 296 K/UL (ref 135–450)
PLATELET BLD QL SMEAR: ADEQUATE
PMV BLD AUTO: 7.1 FL (ref 5–10.5)
POTASSIUM SERPL-SCNC: 4.5 MMOL/L (ref 3.5–5.1)
PROLYMPHOCYTES: 4 %
PROT SERPL-MCNC: 7.2 G/DL (ref 6.4–8.2)
RBC # BLD AUTO: 4.97 M/UL (ref 4–5.2)
RBC MORPH BLD: NORMAL
SLIDE REVIEW: ABNORMAL
SODIUM SERPL-SCNC: 139 MMOL/L (ref 136–145)
TRIGL SERPL-MCNC: 270 MG/DL (ref 0–150)
TSH SERPL DL<=0.005 MIU/L-ACNC: 3.35 UIU/ML (ref 0.27–4.2)
VLDLC SERPL CALC-MCNC: 54 MG/DL
WBC # BLD AUTO: 13.1 K/UL (ref 4–11)

## 2023-08-18 ENCOUNTER — TELEPHONE (OUTPATIENT)
Dept: INTERNAL MEDICINE CLINIC | Age: 83
End: 2023-08-18

## 2023-08-18 NOTE — TELEPHONE ENCOUNTER
----- Message from Summer Rollins DO sent at 8/17/2023 12:31 PM EDT -----  Notify patient lab tests remain about the same. No new concerns.

## 2023-08-18 NOTE — TELEPHONE ENCOUNTER
Patient calling for most recent lab results. Dr. Seda Love lab note relayed to patient as follows:     Notify patient lab tests remain about the same. No new concerns. Patient voiced understanding. She requested a copy of of the results to be mailed to her.

## 2023-08-25 ENCOUNTER — HOSPITAL ENCOUNTER (OUTPATIENT)
Dept: VASCULAR LAB | Age: 83
Discharge: HOME | End: 2023-08-25
Payer: MEDICARE

## 2023-08-25 DIAGNOSIS — I65.23 BILATERAL CAROTID ARTERY STENOSIS: ICD-10-CM

## 2023-08-25 PROCEDURE — 93880 EXTRACRANIAL BILAT STUDY: CPT

## 2023-09-08 RX ORDER — AMLODIPINE BESYLATE 5 MG/1
5 TABLET ORAL DAILY
Qty: 90 TABLET | Refills: 1 | Status: SHIPPED | OUTPATIENT
Start: 2023-09-08

## 2023-10-31 ENCOUNTER — TELEPHONE (OUTPATIENT)
Dept: INTERNAL MEDICINE CLINIC | Age: 83
End: 2023-10-31

## 2023-10-31 DIAGNOSIS — Z12.31 ENCOUNTER FOR SCREENING MAMMOGRAM FOR MALIGNANT NEOPLASM OF BREAST: Primary | ICD-10-CM

## 2023-10-31 NOTE — TELEPHONE ENCOUNTER
----- Message from [address] [place] [address] sent at 10/31/2023  3:01 PM EDT -----  Subject: Referral Request    Reason for referral request? diagnostic mammogram  Provider patient wants to be referred to(if known):     Provider Phone Number(if known): Additional Information for Provider?  please call pt when order is in  ---------------------------------------------------------------------------  --------------  [address]    [phone]; OK to leave message on voicemail  ---------------------------------------------------------------------------  --------------

## 2023-11-03 ENCOUNTER — HOSPITAL ENCOUNTER (OUTPATIENT)
Dept: WOMENS IMAGING | Age: 83
Discharge: HOME | End: 2023-11-03
Payer: MEDICARE

## 2023-11-03 ENCOUNTER — OFFICE VISIT (OUTPATIENT)
Dept: CARDIOLOGY CLINIC | Age: 83
End: 2023-11-03
Payer: MEDICARE

## 2023-11-03 VITALS
HEIGHT: 62 IN | SYSTOLIC BLOOD PRESSURE: 144 MMHG | DIASTOLIC BLOOD PRESSURE: 72 MMHG | WEIGHT: 187 LBS | BODY MASS INDEX: 34.41 KG/M2 | OXYGEN SATURATION: 96 % | HEART RATE: 88 BPM

## 2023-11-03 DIAGNOSIS — Z12.31 ENCOUNTER FOR SCREENING MAMMOGRAM FOR MALIGNANT NEOPLASM OF BREAST: ICD-10-CM

## 2023-11-03 DIAGNOSIS — E78.2 MIXED HYPERLIPIDEMIA: ICD-10-CM

## 2023-11-03 DIAGNOSIS — I25.118 CORONARY ARTERY DISEASE OF NATIVE ARTERY OF NATIVE HEART WITH STABLE ANGINA PECTORIS (HCC): Primary | ICD-10-CM

## 2023-11-03 DIAGNOSIS — I65.23 BILATERAL CAROTID ARTERY STENOSIS: ICD-10-CM

## 2023-11-03 DIAGNOSIS — I10 PRIMARY HYPERTENSION: ICD-10-CM

## 2023-11-03 PROCEDURE — G8484 FLU IMMUNIZE NO ADMIN: HCPCS | Performed by: NURSE PRACTITIONER

## 2023-11-03 PROCEDURE — G8427 DOCREV CUR MEDS BY ELIG CLIN: HCPCS | Performed by: NURSE PRACTITIONER

## 2023-11-03 PROCEDURE — 77063 BREAST TOMOSYNTHESIS BI: CPT

## 2023-11-03 PROCEDURE — G8417 CALC BMI ABV UP PARAM F/U: HCPCS | Performed by: NURSE PRACTITIONER

## 2023-11-03 PROCEDURE — G8399 PT W/DXA RESULTS DOCUMENT: HCPCS | Performed by: NURSE PRACTITIONER

## 2023-11-03 PROCEDURE — 1036F TOBACCO NON-USER: CPT | Performed by: NURSE PRACTITIONER

## 2023-11-03 PROCEDURE — 3078F DIAST BP <80 MM HG: CPT | Performed by: NURSE PRACTITIONER

## 2023-11-03 PROCEDURE — 99214 OFFICE O/P EST MOD 30 MIN: CPT | Performed by: NURSE PRACTITIONER

## 2023-11-03 PROCEDURE — 1090F PRES/ABSN URINE INCON ASSESS: CPT | Performed by: NURSE PRACTITIONER

## 2023-11-03 PROCEDURE — 3075F SYST BP GE 130 - 139MM HG: CPT | Performed by: NURSE PRACTITIONER

## 2023-11-03 PROCEDURE — 1123F ACP DISCUSS/DSCN MKR DOCD: CPT | Performed by: NURSE PRACTITIONER

## 2023-11-13 ENCOUNTER — HOSPITAL ENCOUNTER (OUTPATIENT)
Dept: NON INVASIVE DIAGNOSTICS | Age: 83
Discharge: HOME | End: 2023-11-13
Payer: MEDICARE

## 2023-11-13 DIAGNOSIS — I25.118 CORONARY ARTERY DISEASE OF NATIVE ARTERY OF NATIVE HEART WITH STABLE ANGINA PECTORIS (HCC): ICD-10-CM

## 2023-11-13 PROCEDURE — 78452 HT MUSCLE IMAGE SPECT MULT: CPT | Performed by: INTERNAL MEDICINE

## 2023-11-13 PROCEDURE — 93017 CV STRESS TEST TRACING ONLY: CPT | Performed by: INTERNAL MEDICINE

## 2023-11-13 PROCEDURE — 6360000002 HC RX W HCPCS: Performed by: NURSE PRACTITIONER

## 2023-11-13 PROCEDURE — 6360000002 HC RX W HCPCS: Performed by: INTERNAL MEDICINE

## 2023-11-13 PROCEDURE — A9502 TC99M TETROFOSMIN: HCPCS | Performed by: NURSE PRACTITIONER

## 2023-11-13 PROCEDURE — 3430000000 HC RX DIAGNOSTIC RADIOPHARMACEUTICAL: Performed by: NURSE PRACTITIONER

## 2023-11-13 RX ORDER — REGADENOSON 0.08 MG/ML
0.4 INJECTION, SOLUTION INTRAVENOUS
Status: COMPLETED | OUTPATIENT
Start: 2023-11-13 | End: 2023-11-13

## 2023-11-13 RX ORDER — AMINOPHYLLINE 25 MG/ML
100 INJECTION, SOLUTION INTRAVENOUS ONCE
Status: COMPLETED | OUTPATIENT
Start: 2023-11-13 | End: 2023-11-13

## 2023-11-13 RX ADMIN — REGADENOSON 0.4 MG: 0.08 INJECTION, SOLUTION INTRAVENOUS at 13:35

## 2023-11-13 RX ADMIN — TETROFOSMIN 10 MILLICURIE: 1.38 INJECTION, POWDER, LYOPHILIZED, FOR SOLUTION INTRAVENOUS at 12:40

## 2023-11-13 RX ADMIN — TETROFOSMIN 30 MILLICURIE: 1.38 INJECTION, POWDER, LYOPHILIZED, FOR SOLUTION INTRAVENOUS at 13:35

## 2023-11-13 RX ADMIN — AMINOPHYLLINE 100 MG: 25 INJECTION, SOLUTION INTRAVENOUS at 13:39

## 2023-11-13 NOTE — PROGRESS NOTES
Instructed on Lexiscan Stress Test Procedure including possible side effects/ adverse reactions. Patient verbalizes  understanding and denies having any questions . See [facility]         Instructed on [address] [place] [address] S Stress Test Procedure including possible side effects/ adverse reactions. Patient verbalizes  understanding and denies having any questions . See [facility]

## 2023-11-16 NOTE — PROGRESS NOTES
Attestation: This note was scribed in the presence of Dr. Mj Beck MD by Kelly Bartlett RN       I, Dr. Mj Beck, personally performed the services described in this documentation, as scribed by the above signed scribe in my presence. It is both accurate and complete to my knowledge. I agree with the details independently gathered by the clinical support staff, while the remaining scribed note accurately describes my personal service to the patient.

## 2023-11-17 ENCOUNTER — OFFICE VISIT (OUTPATIENT)
Dept: CARDIOLOGY CLINIC | Age: 83
End: 2023-11-17
Payer: MEDICARE

## 2023-11-17 VITALS
HEIGHT: 62 IN | BODY MASS INDEX: 34.41 KG/M2 | HEART RATE: 90 BPM | SYSTOLIC BLOOD PRESSURE: 126 MMHG | WEIGHT: 187 LBS | OXYGEN SATURATION: 95 % | DIASTOLIC BLOOD PRESSURE: 74 MMHG

## 2023-11-17 DIAGNOSIS — I50.32 CHRONIC DIASTOLIC CHF (CONGESTIVE HEART FAILURE) (HCC): ICD-10-CM

## 2023-11-17 DIAGNOSIS — I10 BENIGN ESSENTIAL HTN: ICD-10-CM

## 2023-11-17 DIAGNOSIS — I25.10 CORONARY ARTERY DISEASE INVOLVING NATIVE CORONARY ARTERY OF NATIVE HEART WITHOUT ANGINA PECTORIS: Primary | ICD-10-CM

## 2023-11-17 DIAGNOSIS — E78.2 MIXED HYPERLIPIDEMIA: ICD-10-CM

## 2023-11-17 PROCEDURE — G8399 PT W/DXA RESULTS DOCUMENT: HCPCS | Performed by: INTERNAL MEDICINE

## 2023-11-17 PROCEDURE — 1036F TOBACCO NON-USER: CPT | Performed by: INTERNAL MEDICINE

## 2023-11-17 PROCEDURE — 99214 OFFICE O/P EST MOD 30 MIN: CPT | Performed by: INTERNAL MEDICINE

## 2023-11-17 PROCEDURE — G8417 CALC BMI ABV UP PARAM F/U: HCPCS | Performed by: INTERNAL MEDICINE

## 2023-11-17 PROCEDURE — 3074F SYST BP LT 130 MM HG: CPT | Performed by: INTERNAL MEDICINE

## 2023-11-17 PROCEDURE — 3078F DIAST BP <80 MM HG: CPT | Performed by: INTERNAL MEDICINE

## 2023-11-17 PROCEDURE — 1123F ACP DISCUSS/DSCN MKR DOCD: CPT | Performed by: INTERNAL MEDICINE

## 2023-11-17 PROCEDURE — G8427 DOCREV CUR MEDS BY ELIG CLIN: HCPCS | Performed by: INTERNAL MEDICINE

## 2023-11-17 PROCEDURE — 1090F PRES/ABSN URINE INCON ASSESS: CPT | Performed by: INTERNAL MEDICINE

## 2023-11-17 PROCEDURE — G8484 FLU IMMUNIZE NO ADMIN: HCPCS | Performed by: INTERNAL MEDICINE

## 2024-05-29 ENCOUNTER — OFFICE VISIT (OUTPATIENT)
Dept: INTERNAL MEDICINE CLINIC | Age: 84
End: 2024-05-29

## 2024-05-29 VITALS
HEART RATE: 90 BPM | BODY MASS INDEX: 34.41 KG/M2 | DIASTOLIC BLOOD PRESSURE: 88 MMHG | TEMPERATURE: 97 F | OXYGEN SATURATION: 94 % | SYSTOLIC BLOOD PRESSURE: 130 MMHG | WEIGHT: 187 LBS | HEIGHT: 62 IN

## 2024-05-29 DIAGNOSIS — K76.0 NAFLD (NONALCOHOLIC FATTY LIVER DISEASE): ICD-10-CM

## 2024-05-29 DIAGNOSIS — E78.2 MIXED HYPERLIPIDEMIA: ICD-10-CM

## 2024-05-29 DIAGNOSIS — I10 BENIGN ESSENTIAL HTN: ICD-10-CM

## 2024-05-29 DIAGNOSIS — I25.10 CORONARY ARTERY DISEASE INVOLVING NATIVE CORONARY ARTERY OF NATIVE HEART WITHOUT ANGINA PECTORIS: ICD-10-CM

## 2024-05-29 DIAGNOSIS — M81.0 AGE-RELATED OSTEOPOROSIS WITHOUT CURRENT PATHOLOGICAL FRACTURE: ICD-10-CM

## 2024-05-29 DIAGNOSIS — Z00.00 MEDICARE ANNUAL WELLNESS VISIT, SUBSEQUENT: ICD-10-CM

## 2024-05-29 DIAGNOSIS — C91.10 CLL (CHRONIC LYMPHOCYTIC LEUKEMIA) (HCC): ICD-10-CM

## 2024-05-29 DIAGNOSIS — Z00.00 MEDICARE ANNUAL WELLNESS VISIT, SUBSEQUENT: Primary | ICD-10-CM

## 2024-05-29 DIAGNOSIS — R73.01 IMPAIRED FASTING BLOOD SUGAR: ICD-10-CM

## 2024-05-29 DIAGNOSIS — I50.32 CHRONIC DIASTOLIC CHF (CONGESTIVE HEART FAILURE) (HCC): ICD-10-CM

## 2024-05-29 DIAGNOSIS — N39.46 MIXED STRESS AND URGE URINARY INCONTINENCE: ICD-10-CM

## 2024-05-29 PROBLEM — I25.119 ATHEROSCLEROTIC HEART DISEASE OF NATIVE CORONARY ARTERY WITH UNSPECIFIED ANGINA PECTORIS (HCC): Status: RESOLVED | Noted: 2023-07-14 | Resolved: 2024-05-29

## 2024-05-29 RX ORDER — MAGNESIUM 30 MG
30 TABLET ORAL 2 TIMES DAILY
Qty: 30 TABLET | Status: CANCELLED | OUTPATIENT
Start: 2024-05-29

## 2024-05-29 ASSESSMENT — PATIENT HEALTH QUESTIONNAIRE - PHQ9
SUM OF ALL RESPONSES TO PHQ9 QUESTIONS 1 & 2: 2
1. LITTLE INTEREST OR PLEASURE IN DOING THINGS: SEVERAL DAYS
2. FEELING DOWN, DEPRESSED OR HOPELESS: SEVERAL DAYS
SUM OF ALL RESPONSES TO PHQ QUESTIONS 1-9: 2
2. FEELING DOWN, DEPRESSED OR HOPELESS: SEVERAL DAYS
SUM OF ALL RESPONSES TO PHQ9 QUESTIONS 1 & 2: 2
SUM OF ALL RESPONSES TO PHQ QUESTIONS 1-9: 2
SUM OF ALL RESPONSES TO PHQ QUESTIONS 1-9: 2
1. LITTLE INTEREST OR PLEASURE IN DOING THINGS: SEVERAL DAYS
SUM OF ALL RESPONSES TO PHQ QUESTIONS 1-9: 2
SUM OF ALL RESPONSES TO PHQ QUESTIONS 1-9: 2

## 2024-05-29 ASSESSMENT — LIFESTYLE VARIABLES
HOW OFTEN DO YOU HAVE A DRINK CONTAINING ALCOHOL: NEVER
HOW MANY STANDARD DRINKS CONTAINING ALCOHOL DO YOU HAVE ON A TYPICAL DAY: PATIENT DOES NOT DRINK

## 2024-05-29 NOTE — PROGRESS NOTES
well-balanced diet provided      ADL's:   Patient reports needing help with:  Select all that apply: (!) Walking/Balance        ROS:  HEME/ONC:  Saw Dr. Jones.  Lab was OK according to pt.  She will return in 1 year.  Pt has CLL/Small Lymphocytic Lymphoma  : Urinary incontinence, worse through the night.  She has accidents during the day as well.   She has two normal BM's per day.  She occasionally has bowel incontinence.   She wears a pad.  She does not want medication.      OBJECTIVE     Vitals:    05/29/24 1327   BP: 130/88   Pulse: 90   Temp: 97 °F (36.1 °C)   TempSrc: Temporal   SpO2: 94%   Weight: 84.8 kg (187 lb)   Height: 1.575 m (5' 2\")     Body mass index is 34.2 kg/m².     Wt Readings from Last 3 Encounters:   05/29/24 84.8 kg (187 lb)   11/17/23 84.8 kg (187 lb)   11/03/23 84.8 kg (187 lb)     BP Readings from Last 3 Encounters:   05/29/24 130/88   11/17/23 126/74   11/03/23 (!) 144/72          GEN:  84 y.o. female who is in NAD, A&O.  She appears stated age and well nourished.  She is cooperative and pleasant.    HEAD:  NC/AT, no lesions.  EYES:  JOSHUA, EOMI, No scleral icterus or conjunctival injection or discharge.  Visual fields in tact to confrontation.   EARS:  EAC's clear, TM's normal.  MOUTH & THROAT:  Oral cavity is clear without mucosal lesions.  Tongue is midline.  Dentition is in good repair.  No pharyngeal erythema or exudate.  NECK:  Supple.  Full ROM.  Trachea is midline.  No increased JVD.  No thyromegaly or nodules.  No masses  LYMPH: No C/SC/A/F nodes  CARDIAC:  S1S2 NL.  Regular rhythm.   No murmur/clicks/rubs.  No ectopy.  PMI is non-displaced.  VASC:  Pedal pulses 2/4.  Carotid upstrokes 2+.  No bruits noted.    PULM:  Lungs are CTA.  Symmetric breath sounds noted.  AP Diameter NL.  GI:  Abdomen is soft and nontender.  No distension.  No organomegaly.  No masses.  No pulsatile masses.    EXT:  No Cyanosis or clubbing.  No edema.  + Lipedema.    SKIN: Warm and dry, normal turgor,

## 2024-05-29 NOTE — PATIENT INSTRUCTIONS
peas, nuts, seeds, and soy products.     Limit drinks and foods with added sugar. These include candy, desserts, and soda pop.   Heart-healthy lifestyle    If your doctor recommends it, get more exercise. For many people, walking is a good choice. Or you may want to swim, bike, or do other activities. Bit by bit, increase the time you're active every day. Try for at least 30 minutes on most days of the week.     Try to quit or cut back on using tobacco and other nicotine products. This includes smoking and vaping. If you need help quitting, talk to your doctor about stop-smoking programs and medicines. These can increase your chances of quitting for good. Quitting is one of the most important things you can do to protect your heart. It is never too late to quit. Try to avoid secondhand smoke too.     Stay at a weight that's healthy for you. Talk to your doctor if you need help losing weight.     Try to get 7 to 9 hours of sleep each night.     Limit alcohol to 2 drinks a day for men and 1 drink a day for women. Too much alcohol can cause health problems.     Manage other health problems such as diabetes, high blood pressure, and high cholesterol. If you think you may have a problem with alcohol or drug use, talk to your doctor.   Medicines    Take your medicines exactly as prescribed. Call your doctor if you think you are having a problem with your medicine.     If your doctor recommends aspirin, take the amount directed each day. Make sure you take aspirin and not another kind of pain reliever, such as acetaminophen (Tylenol).   When should you call for help?   Call 911 if you have symptoms of a heart attack. These may include:    Chest pain or pressure, or a strange feeling in the chest.     Sweating.     Shortness of breath.     Pain, pressure, or a strange feeling in the back, neck, jaw, or upper belly or in one or both shoulders or arms.     Lightheadedness or sudden weakness.     A fast or irregular heartbeat.

## 2024-05-30 LAB
ALBUMIN SERPL-MCNC: 4.4 G/DL (ref 3.4–5)
ALBUMIN/GLOB SERPL: 1.7 {RATIO} (ref 1.1–2.2)
ALP SERPL-CCNC: 111 U/L (ref 40–129)
ALT SERPL-CCNC: 37 U/L (ref 10–40)
ANION GAP SERPL CALCULATED.3IONS-SCNC: 16 MMOL/L (ref 3–16)
AST SERPL-CCNC: 28 U/L (ref 15–37)
BASOPHILS # BLD: 0.1 K/UL (ref 0–0.2)
BASOPHILS NFR BLD: 0.8 %
BILIRUB SERPL-MCNC: 0.5 MG/DL (ref 0–1)
BUN SERPL-MCNC: 18 MG/DL (ref 7–20)
CALCIUM SERPL-MCNC: 9.5 MG/DL (ref 8.3–10.6)
CHLORIDE SERPL-SCNC: 98 MMOL/L (ref 99–110)
CHOLEST SERPL-MCNC: 155 MG/DL (ref 0–199)
CO2 SERPL-SCNC: 23 MMOL/L (ref 21–32)
CREAT SERPL-MCNC: 0.5 MG/DL (ref 0.6–1.2)
DEPRECATED RDW RBC AUTO: 13.5 % (ref 12.4–15.4)
EOSINOPHIL # BLD: 0.2 K/UL (ref 0–0.6)
EOSINOPHIL NFR BLD: 1.2 %
EST. AVERAGE GLUCOSE BLD GHB EST-MCNC: 102.5 MG/DL
GFR SERPLBLD CREATININE-BSD FMLA CKD-EPI: >90 ML/MIN/{1.73_M2}
GLUCOSE SERPL-MCNC: 99 MG/DL (ref 70–99)
HBA1C MFR BLD: 5.2 %
HCT VFR BLD AUTO: 47.6 % (ref 36–48)
HDLC SERPL-MCNC: 42 MG/DL (ref 40–60)
HGB BLD-MCNC: 15.8 G/DL (ref 12–16)
LDLC SERPL CALC-MCNC: 59 MG/DL
LYMPHOCYTES # BLD: 6.2 K/UL (ref 1–5.1)
LYMPHOCYTES NFR BLD: 47 %
MAGNESIUM SERPL-MCNC: 2.2 MG/DL (ref 1.8–2.4)
MCH RBC QN AUTO: 30.9 PG (ref 26–34)
MCHC RBC AUTO-ENTMCNC: 33.3 G/DL (ref 31–36)
MCV RBC AUTO: 92.8 FL (ref 80–100)
MONOCYTES # BLD: 0.8 K/UL (ref 0–1.3)
MONOCYTES NFR BLD: 6.3 %
NEUTROPHILS # BLD: 5.9 K/UL (ref 1.7–7.7)
NEUTROPHILS NFR BLD: 44.7 %
PATH INTERP BLD-IMP: NO
PLATELET # BLD AUTO: 266 K/UL (ref 135–450)
PMV BLD AUTO: 8 FL (ref 5–10.5)
POTASSIUM SERPL-SCNC: 4.5 MMOL/L (ref 3.5–5.1)
PROT SERPL-MCNC: 7 G/DL (ref 6.4–8.2)
RBC # BLD AUTO: 5.13 M/UL (ref 4–5.2)
SODIUM SERPL-SCNC: 137 MMOL/L (ref 136–145)
TRIGL SERPL-MCNC: 271 MG/DL (ref 0–150)
TSH SERPL DL<=0.005 MIU/L-ACNC: 3.01 UIU/ML (ref 0.27–4.2)
VLDLC SERPL CALC-MCNC: 54 MG/DL
WBC # BLD AUTO: 13.3 K/UL (ref 4–11)

## 2024-06-17 ENCOUNTER — APPOINTMENT (OUTPATIENT)
Dept: CT IMAGING | Age: 84
End: 2024-06-17
Payer: MEDICARE

## 2024-06-17 ENCOUNTER — HOSPITAL ENCOUNTER (EMERGENCY)
Age: 84
Discharge: HOME | End: 2024-06-18
Attending: INTERNAL MEDICINE
Payer: MEDICARE

## 2024-06-17 ENCOUNTER — TELEPHONE (OUTPATIENT)
Dept: INTERNAL MEDICINE CLINIC | Age: 84
End: 2024-06-17

## 2024-06-17 VITALS
HEIGHT: 62 IN | BODY MASS INDEX: 33.68 KG/M2 | TEMPERATURE: 98.6 F | DIASTOLIC BLOOD PRESSURE: 78 MMHG | HEART RATE: 65 BPM | SYSTOLIC BLOOD PRESSURE: 142 MMHG | WEIGHT: 183 LBS | OXYGEN SATURATION: 94 % | RESPIRATION RATE: 14 BRPM

## 2024-06-17 DIAGNOSIS — N39.0 URINARY TRACT INFECTION IN FEMALE: Primary | ICD-10-CM

## 2024-06-17 DIAGNOSIS — M54.9 MID BACK PAIN: ICD-10-CM

## 2024-06-17 LAB
ALBUMIN SERPL-MCNC: 4.3 G/DL (ref 3.4–5)
ALP SERPL-CCNC: 92 U/L (ref 40–129)
ALT SERPL-CCNC: 26 U/L (ref 10–40)
ANION GAP SERPL CALCULATED.3IONS-SCNC: 12 MMOL/L (ref 3–16)
AST SERPL-CCNC: 24 U/L (ref 15–37)
BACTERIA URNS QL MICRO: ABNORMAL /HPF
BASOPHILS # BLD: 0 K/UL (ref 0–0.2)
BASOPHILS NFR BLD: 0 %
BILIRUB DIRECT SERPL-MCNC: <0.2 MG/DL (ref 0–0.3)
BILIRUB INDIRECT SERPL-MCNC: NORMAL MG/DL (ref 0–1)
BILIRUB SERPL-MCNC: 0.6 MG/DL (ref 0–1)
BILIRUB UR QL STRIP.AUTO: NEGATIVE
BUN SERPL-MCNC: 10 MG/DL (ref 7–20)
CALCIUM SERPL-MCNC: 9.1 MG/DL (ref 8.3–10.6)
CHLORIDE SERPL-SCNC: 103 MMOL/L (ref 99–110)
CLARITY UR: CLEAR
CO2 SERPL-SCNC: 22 MMOL/L (ref 21–32)
COLOR UR: YELLOW
CREAT SERPL-MCNC: <0.5 MG/DL (ref 0.6–1.2)
DEPRECATED RDW RBC AUTO: 13.2 % (ref 12.4–15.4)
EOSINOPHIL # BLD: 0.3 K/UL (ref 0–0.6)
EOSINOPHIL NFR BLD: 2 %
EPI CELLS #/AREA URNS AUTO: 0 /HPF (ref 0–5)
GFR SERPLBLD CREATININE-BSD FMLA CKD-EPI: >90 ML/MIN/{1.73_M2}
GLUCOSE SERPL-MCNC: 133 MG/DL (ref 70–99)
GLUCOSE UR STRIP.AUTO-MCNC: NEGATIVE MG/DL
HCT VFR BLD AUTO: 44 % (ref 36–48)
HGB BLD-MCNC: 15.1 G/DL (ref 12–16)
HGB UR QL STRIP.AUTO: NEGATIVE
HYALINE CASTS #/AREA URNS AUTO: 0 /LPF (ref 0–8)
KETONES UR STRIP.AUTO-MCNC: NEGATIVE MG/DL
LEUKOCYTE ESTERASE UR QL STRIP.AUTO: ABNORMAL
LYMPHOCYTES # BLD: 4.4 K/UL (ref 1–5.1)
LYMPHOCYTES NFR BLD: 33 %
MCH RBC QN AUTO: 31.4 PG (ref 26–34)
MCHC RBC AUTO-ENTMCNC: 34.4 G/DL (ref 31–36)
MCV RBC AUTO: 91.2 FL (ref 80–100)
MONOCYTES # BLD: 0.7 K/UL (ref 0–1.3)
MONOCYTES NFR BLD: 5 %
NEUTROPHILS # BLD: 7.9 K/UL (ref 1.7–7.7)
NEUTROPHILS NFR BLD: 60 %
NITRITE UR QL STRIP.AUTO: POSITIVE
PH UR STRIP.AUTO: 7 [PH] (ref 5–8)
PLATELET # BLD AUTO: 252 K/UL (ref 135–450)
PMV BLD AUTO: 6.9 FL (ref 5–10.5)
POTASSIUM SERPL-SCNC: 3.7 MMOL/L (ref 3.5–5.1)
PROT SERPL-MCNC: 7.1 G/DL (ref 6.4–8.2)
PROT UR STRIP.AUTO-MCNC: NEGATIVE MG/DL
RBC # BLD AUTO: 4.82 M/UL (ref 4–5.2)
RBC CLUMPS #/AREA URNS AUTO: 1 /HPF (ref 0–4)
RBC MORPH BLD: NORMAL
SLIDE REVIEW: ABNORMAL
SODIUM SERPL-SCNC: 137 MMOL/L (ref 136–145)
SP GR UR STRIP.AUTO: 1.01 (ref 1–1.03)
UA COMPLETE W REFLEX CULTURE PNL UR: YES
UA DIPSTICK W REFLEX MICRO PNL UR: YES
URN SPEC COLLECT METH UR: ABNORMAL
UROBILINOGEN UR STRIP-ACNC: 0.2 E.U./DL
WBC # BLD AUTO: 13.2 K/UL (ref 4–11)
WBC #/AREA URNS AUTO: 31 /HPF (ref 0–5)

## 2024-06-17 PROCEDURE — 85025 COMPLETE CBC W/AUTO DIFF WBC: CPT

## 2024-06-17 PROCEDURE — 96374 THER/PROPH/DIAG INJ IV PUSH: CPT

## 2024-06-17 PROCEDURE — 80076 HEPATIC FUNCTION PANEL: CPT

## 2024-06-17 PROCEDURE — 99284 EMERGENCY DEPT VISIT MOD MDM: CPT

## 2024-06-17 PROCEDURE — 81003 URINALYSIS AUTO W/O SCOPE: CPT

## 2024-06-17 PROCEDURE — 36415 COLL VENOUS BLD VENIPUNCTURE: CPT

## 2024-06-17 PROCEDURE — 6370000000 HC RX 637 (ALT 250 FOR IP): Performed by: NURSE PRACTITIONER

## 2024-06-17 PROCEDURE — 74176 CT ABD & PELVIS W/O CONTRAST: CPT

## 2024-06-17 PROCEDURE — 80048 BASIC METABOLIC PNL TOTAL CA: CPT

## 2024-06-17 PROCEDURE — 6360000002 HC RX W HCPCS: Performed by: INTERNAL MEDICINE

## 2024-06-17 PROCEDURE — 81001 URINALYSIS AUTO W/SCOPE: CPT

## 2024-06-17 PROCEDURE — 2580000003 HC RX 258: Performed by: INTERNAL MEDICINE

## 2024-06-17 RX ORDER — CIPROFLOXACIN 500 MG/1
500 TABLET, FILM COATED ORAL 2 TIMES DAILY
Qty: 20 TABLET | Refills: 0 | Status: SHIPPED | OUTPATIENT
Start: 2024-06-17 | End: 2024-06-27

## 2024-06-17 RX ORDER — 0.9 % SODIUM CHLORIDE 0.9 %
500 INTRAVENOUS SOLUTION INTRAVENOUS ONCE
Status: COMPLETED | OUTPATIENT
Start: 2024-06-17 | End: 2024-06-17

## 2024-06-17 RX ORDER — AMLODIPINE BESYLATE 5 MG/1
5 TABLET ORAL DAILY
Qty: 90 TABLET | Refills: 1 | Status: SHIPPED | OUTPATIENT
Start: 2024-06-17

## 2024-06-17 RX ORDER — CIPROFLOXACIN 250 MG/1
250 TABLET, FILM COATED ORAL 2 TIMES DAILY
Qty: 14 TABLET | Refills: 0 | OUTPATIENT
Start: 2024-06-17 | End: 2024-06-24

## 2024-06-17 RX ORDER — CIPROFLOXACIN 500 MG/1
500 TABLET, FILM COATED ORAL ONCE
Status: COMPLETED | OUTPATIENT
Start: 2024-06-18 | End: 2024-06-17

## 2024-06-17 RX ORDER — FENTANYL CITRATE 50 UG/ML
25 INJECTION, SOLUTION INTRAMUSCULAR; INTRAVENOUS ONCE
Status: COMPLETED | OUTPATIENT
Start: 2024-06-17 | End: 2024-06-17

## 2024-06-17 RX ADMIN — FENTANYL CITRATE 25 MCG: 50 INJECTION INTRAMUSCULAR; INTRAVENOUS at 22:34

## 2024-06-17 RX ADMIN — SODIUM CHLORIDE 500 ML: 9 INJECTION, SOLUTION INTRAVENOUS at 21:00

## 2024-06-17 RX ADMIN — CIPROFLOXACIN HYDROCHLORIDE 500 MG: 500 TABLET, FILM COATED ORAL at 23:57

## 2024-06-17 ASSESSMENT — PAIN SCALES - GENERAL
PAINLEVEL_OUTOF10: 5
PAINLEVEL_OUTOF10: 2
PAINLEVEL_OUTOF10: 8

## 2024-06-17 ASSESSMENT — PAIN DESCRIPTION - LOCATION
LOCATION: BACK
LOCATION: BACK

## 2024-06-17 NOTE — TELEPHONE ENCOUNTER
Medication:   Requested Prescriptions     Pending Prescriptions Disp Refills    ciprofloxacin (CIPRO) 250 MG tablet 14 tablet 0     Sig: Take 1 tablet by mouth 2 times daily for 7 days    amLODIPine (NORVASC) 5 MG tablet 90 tablet 1     Sig: Take 1 tablet by mouth daily        Last Filled: 7/8/13 9/8/23    Patient Phone Number: [phone] (home)     Last appt: 5/29/2024   Next appt: 11/29/2024    Last OARRS:        No data to display

## 2024-06-17 NOTE — TELEPHONE ENCOUNTER
Patient is calling in for  in regards to medication refill for:    amLODIPine (NORVASC) 5 MG tablet   Last appointment: 5/29/2024  Next appointment: 11/29/2024  Last refill: 9/08/23      ciprofloxacin (CIPRO) 250 MG tablet   Last appointment: 5/29/2024  Next appointment: 11/29/2024  Last refill: 11/08/2016      Per patient states she has a possible cold/infection again and wasn't able to walk this morning very well. Patient declined appointment due to  knowing she has recurrent problem. Patient would like medication refill's.     Please send medication refill to :    [id number] [place], [place] - [address] - [place] [phone] - F [phone]

## 2024-06-17 NOTE — TELEPHONE ENCOUNTER
FYI   Patient called back in to check on Rx refill for ciprofloxacin (CIPRO) 250 MG tablet. Patient in really bad pain across her lower back and stated she doesn't want to go the hospital thinks maybe she has an infection as the previous note stated. Please advise would like a callback on next steps or when Rx has been sent over to the pharmacy.

## 2024-06-17 NOTE — TELEPHONE ENCOUNTER
Call from patient on the after-hours line.  Patient requesting an antibiotic for a UTI.  Patient states she is having low back pain and diarrhea and she thinks she has a bladder infection.  She is requesting an antibiotic be called in.  I explained to her that an antibiotic at this juncture is not appropriate.  I recommended she go to the urgent care to get evaluated ASAP.  Patient agreed

## 2024-06-18 ENCOUNTER — CARE COORDINATION (OUTPATIENT)
Dept: CARE COORDINATION | Age: 84
End: 2024-06-18

## 2024-06-18 PROCEDURE — 87086 URINE CULTURE/COLONY COUNT: CPT

## 2024-06-18 PROCEDURE — 87186 SC STD MICRODIL/AGAR DIL: CPT

## 2024-06-18 PROCEDURE — 87088 URINE BACTERIA CULTURE: CPT

## 2024-06-18 ASSESSMENT — ENCOUNTER SYMPTOMS
NAUSEA: 0
DIARRHEA: 0
VOMITING: 0
ABDOMINAL PAIN: 0
SHORTNESS OF BREATH: 0
BACK PAIN: 1
CHEST TIGHTNESS: 0

## 2024-06-18 NOTE — CARE COORDINATION
Ambulatory Care Coordination Note     2024 11:05 AM     Patient Current Location:  Home: [facility] [place] [facility] [address]     This patient was received as a referral from Ambulatory Care Manager .    ACM contacted the patient by telephone. Verified name and  with patient as identifiers. Provided introduction to self, and explanation of the ACM role.   Patient accepted care management services at this time.          ACM: Sara Xie RN     Challenges to be reviewed by the provider   Additional needs identified to be addressed with provider No  Will be starting antbx today. If back pain persists , she will call to schedule w dr hooper               Method of communication with provider: chart routing.    Care Summary Note:     Offered patient enrollment in the Remote Patient Monitoring (RPM) program for in-home monitoring: Patient is not eligible for RPM program because: patient does not have qualifying diagnosis.     Assessments Completed:   Social Determinants of Health     Tobacco Use: Low Risk  (2023)    Patient History     Smoking Tobacco Use: Never     Smokeless Tobacco Use: Never     Passive Exposure: Not on file   Alcohol Use: Not At Risk (2024)    AUDIT-C     Frequency of Alcohol Consumption: Never     Average Number of Drinks: Patient does not drink     Frequency of Binge Drinking: Never   Financial Resource Strain: Low Risk  (8/15/2023)    Overall Financial Resource Strain (CARDIA)     Difficulty of Paying Living Expenses: Not hard at all   Food Insecurity: Not on file (8/15/2023)   Transportation Needs: Unknown (8/15/2023)    PRAPARE - Transportation     Lack of Transportation (Medical): Not on file     Lack of Transportation (Non-Medical): No   Physical Activity: Insufficiently Active (2024)    Exercise Vital Sign     Days of Exercise per Week: 3 days     Minutes of Exercise per Session: 20 min   Stress: Not on file   Social Connections: Not on file   Intimate Partner

## 2024-06-18 NOTE — ED NOTES
Patient oriented to room and ED throughput process.  Safety measures with ED bed locked in lowest position and call light in reach.  Patient educated on all orders, including any medications.  Patient educated on chief complaint/symptoms. Patient encouraged to ask questions regarding care, medications or treatment plan.  Patient aware of how to reach staff with questions/concerns.    No

## 2024-06-18 NOTE — ED PROVIDER NOTES
EMERGENCY MEDICINE PROVIDER NOTE    Patient Identification  Pt Name: Steffanie Chavez  MRN: [id number]  Birthdate 1940  Date of evaluation: 6/17/2024  Provider: MARCUS GAMA DO  PCP: Kev Villafana DO    Chief Complaint  Back Pain (Pt has back pain since Saturday, no fall)      HPI  (History provided by patient)  This is a 84 y.o. female who was brought in by self for upper back pain that radiates down to her lower back and into her lower abdomen since Saturday.  Patient denies any fall.  Patient denies numbness or tingling going down her legs.  She states that she is able to raise her legs off the bed but cannot due to the weight of 1 blanket in the ER.  She denies loss of bowel or bladder function.  She is concerned she may have a UTI due to the lower abdominal pain.  Patient also tells me that she has CLL and has treated it with multiple vitamins at home.  The back pain and abdominal pain is severe nature.  Movement makes it worse.  Today she came because the back pain was so severe.  She denies any fever or chills.  Patient denies chest pain or shortness of breath.    I have reviewed the following nursing documentation:  Allergies: Latex, Antihistamine [diphenhydramine hcl], Decongest, Penicillins, Biaxin [clarithromycin], Caffeine, Iodine, Rhinocort [budesonide], Sulfa antibiotics, Wheat, Codeine, and Cortisone    Past medical history:   Past Medical History:   Diagnosis Date    Allergic rhinitis     Anesthesia slow to wake    Arthropathy of cervical facet joint     CAD (coronary artery disease) 7/12/2012    Chronic diastolic CHF (congestive heart failure) (HCC)     CLL (chronic lymphocytic leukemia) (HCC) 5/29/2019    DDD (degenerative disc disease), cervical     Diverticulosis 10/21/2010    Fatty liver     Foraminal stenosis of cervical region     GERD (gastroesophageal reflux disease)     Hepatomegaly     Hyperlipidemia     Hypertension     Impaired fasting glucose     Kidney stones     MI (myocardial 
are negative.      Positives and Pertinent negatives as per HPI.     SURGICAL HISTORY     Past Surgical History:   Procedure Laterality Date    APPENDECTOMY      CHOLECYSTECTOMY  1966    COLONOSCOPY  10/21/2010    COLONOSCOPY  12/11/2014    Dr. Blevins    COLONOSCOPY N/A 5/20/2019    COLONOSCOPY WITH BIOPSY performed by Christy Blevins MD at [facility] ENDOSCOPY    COLONOSCOPY  5/20/2019    COLONOSCOPY POLYPECTOMY SNARE/COLD BIOPSY performed by Christy Blevins MD at [facility] ENDOSCOPY    EYE SURGERY      bilateral catarcts removed    FRACTURE SURGERY  10/2011    left wrist    HYSTERECTOMY (CERVIX STATUS UNKNOWN)  1976    total    OVARY REMOVAL  04/1983    PTCA  7/13/2012    3 stents, kissing stents in LAD and stent in the diagonall, Dr. Ragsdale    TONSILLECTOMY      UPPER GASTROINTESTINAL ENDOSCOPY  8/2011    UPPER GASTROINTESTINAL ENDOSCOPY  12/11/2015    Dr. Blevins    WRIST FRACTURE SURGERY  9/27/2011    ORIF left distal radius fracture       CURRENTMEDICATIONS       Discharge Medication List as of 6/17/2024 11:56 PM        CONTINUE these medications which have NOT CHANGED    Details   amLODIPine (NORVASC) 5 MG tablet Take 1 tablet by mouth daily, Disp-90 tablet, R-1Normal      turmeric 500 MG CAPS Take by mouth dailyHistorical Med      Ginkgo Biloba 40 MG TABS Take by mouthHistorical Med      Glucosamine 500 MG CAPS Take by mouthHistorical Med      Probiotic Product (PROBIOTIC-10 PO) Take by mouthHistorical Med      NONFORMULARY daily Liver vitaminHistorical Med      levocetirizine (XYZAL) 5 MG tablet Take 1 tablet by mouth nightly as neededHistorical Med      magnesium 30 MG tablet Take 1 tablet by mouth 2 times dailyHistorical Med      Coenzyme Q10 (COQ10) 100 MG CAPS Take by mouth as neededHistorical Med      Vitamin D-Vitamin K (K2 PLUS D3 PO) Take by mouthHistorical Med      Multiple Vitamins-Minerals (MULTIVITAMIN ADULT PO) Take by mouthHistorical Med      Cranberry 125 MG TABS Take by mouth      Phytosterol Esters

## 2024-06-20 LAB
BACTERIA UR CULT: ABNORMAL
ORGANISM: ABNORMAL

## 2024-06-24 ENCOUNTER — TELEPHONE (OUTPATIENT)
Dept: INTERNAL MEDICINE CLINIC | Age: 84
End: 2024-06-24

## 2024-06-24 NOTE — TELEPHONE ENCOUNTER
[facility] Scheduling is calling in for  in regards to patient adamant that  had ordered a dexa scan an MRI for her to get done.     No MRI seen on chart.     Please call patient and advise.

## 2024-06-24 NOTE — TELEPHONE ENCOUNTER
I did not order an MRI.  We did not discuss and MRI.  I would be happy to have her back in to address this problem

## 2024-06-25 NOTE — TELEPHONE ENCOUNTER
Documentation lengthy. Please call nurse for verbal communication if needed       Called and spoke with patient at length to gather more information.     Pt was seen in ED on 6/17 for back pain.   Pt confirms with this nurse it still persists, is localized right lower back.       CT abd/pelvis was done. One of the notes from ED states   \"I did consider mri lumbar spine\"  So this is why patient called regarding an MRI    Per ED noted, \"Positive for UTI\" for which she was prescribed a 10 day course of cipro.  While on phone with pt, she kept insisting that she needs a refill of the CIPRO. Though she could not relay any s/s of uti to support her request.     I advised patient that an office visit may be necessary.

## 2024-06-25 NOTE — TELEPHONE ENCOUNTER
Please reschedule patient an appointment to address the low back pain.  I have not discussed an MRI with the patient

## 2024-06-26 ENCOUNTER — CARE COORDINATION (OUTPATIENT)
Dept: CARE COORDINATION | Age: 84
End: 2024-06-26

## 2024-06-26 NOTE — CARE COORDINATION
Symptoms, taught by Sara Xie, RN at 6/26/2024 11:59 AM.  Learner: Patient  Readiness: Acceptance  Method: Explanation  Response: Verbalizes Understanding, Demonstrated Understanding    Educate dietary adherence benefits, taught by Sara Xie, RN at 6/26/2024 11:59 AM.  Learner: Patient  Readiness: Acceptance  Method: Explanation  Response: Verbalizes Understanding, Demonstrated Understanding    Education Comments  No comments found.         PCP/Specialist follow up:   Future Appointments         Provider Specialty Dept Phone    6/27/2024 11:00 AM (Arrive by 10:45 AM) Kev Villafana DO Internal Medicine [phone]    7/2/2024 1:30 PM (Arrive by 1:00 PM) [facility] DEXA  1 Radiology [phone]    8/21/2024 2:45 PM Dianne London APRN - CNP Cardiology [phone]    11/29/2024 3:30 PM (Arrive by 3:15 PM) Kev Villafana DO Internal Medicine [phone]            Follow Up:   Plan for next ACM outreach in approximately 1 week to complete:  - medication review   - education   - follow up appointment with PCP .   patient  is agreeable to this plan.

## 2024-06-27 ENCOUNTER — HOSPITAL ENCOUNTER (OUTPATIENT)
Age: 84
Discharge: HOME | End: 2024-06-27
Payer: MEDICARE

## 2024-06-27 ENCOUNTER — OFFICE VISIT (OUTPATIENT)
Dept: INTERNAL MEDICINE CLINIC | Age: 84
End: 2024-06-27

## 2024-06-27 ENCOUNTER — HOSPITAL ENCOUNTER (OUTPATIENT)
Dept: MRI IMAGING | Age: 84
Discharge: HOME | End: 2024-06-27
Payer: MEDICARE

## 2024-06-27 VITALS
OXYGEN SATURATION: 97 % | WEIGHT: 183 LBS | HEART RATE: 87 BPM | BODY MASS INDEX: 33.68 KG/M2 | DIASTOLIC BLOOD PRESSURE: 78 MMHG | SYSTOLIC BLOOD PRESSURE: 136 MMHG | TEMPERATURE: 97.3 F | HEIGHT: 62 IN

## 2024-06-27 DIAGNOSIS — C91.10 CLL (CHRONIC LYMPHOCYTIC LEUKEMIA) (HCC): ICD-10-CM

## 2024-06-27 DIAGNOSIS — M54.6 ACUTE MIDLINE THORACIC BACK PAIN: ICD-10-CM

## 2024-06-27 DIAGNOSIS — N39.0 URINARY TRACT INFECTION WITHOUT HEMATURIA, SITE UNSPECIFIED: ICD-10-CM

## 2024-06-27 DIAGNOSIS — M54.6 ACUTE MIDLINE THORACIC BACK PAIN: Primary | ICD-10-CM

## 2024-06-27 DIAGNOSIS — M54.50 ACUTE BILATERAL LOW BACK PAIN WITHOUT SCIATICA: ICD-10-CM

## 2024-06-27 DIAGNOSIS — S22.080A COMPRESSION FRACTURE OF T12 VERTEBRA, INITIAL ENCOUNTER (HCC): Primary | ICD-10-CM

## 2024-06-27 DIAGNOSIS — M54.9 INTRACTABLE BACK PAIN: ICD-10-CM

## 2024-06-27 LAB
ALBUMIN SERPL-MCNC: 4.1 G/DL (ref 3.4–5)
ALBUMIN/GLOB SERPL: 1.5 {RATIO} (ref 1.1–2.2)
ALP SERPL-CCNC: 101 U/L (ref 40–129)
ALT SERPL-CCNC: 26 U/L (ref 10–40)
ANION GAP SERPL CALCULATED.3IONS-SCNC: 13 MMOL/L (ref 3–16)
AST SERPL-CCNC: 23 U/L (ref 15–37)
BASOPHILS # BLD: 0.1 K/UL (ref 0–0.2)
BASOPHILS NFR BLD: 0.4 %
BILIRUB SERPL-MCNC: 0.6 MG/DL (ref 0–1)
BUN SERPL-MCNC: 15 MG/DL (ref 7–20)
CALCIUM SERPL-MCNC: 8.8 MG/DL (ref 8.3–10.6)
CHLORIDE SERPL-SCNC: 104 MMOL/L (ref 99–110)
CO2 SERPL-SCNC: 22 MMOL/L (ref 21–32)
CREAT SERPL-MCNC: <0.5 MG/DL (ref 0.6–1.2)
CRP SERPL-MCNC: <3 MG/L (ref 0–5.1)
DEPRECATED RDW RBC AUTO: 13.6 % (ref 12.4–15.4)
EOSINOPHIL # BLD: 0.2 K/UL (ref 0–0.6)
EOSINOPHIL NFR BLD: 1.5 %
ERYTHROCYTE [SEDIMENTATION RATE] IN BLOOD BY WESTERGREN METHOD: 8 MM/HR (ref 0–30)
GFR SERPLBLD CREATININE-BSD FMLA CKD-EPI: >90 ML/MIN/{1.73_M2}
GLUCOSE SERPL-MCNC: 85 MG/DL (ref 70–99)
HCT VFR BLD AUTO: 45.6 % (ref 36–48)
HGB BLD-MCNC: 15.4 G/DL (ref 12–16)
LYMPHOCYTES # BLD: 5.9 K/UL (ref 1–5.1)
LYMPHOCYTES NFR BLD: 41.5 %
MCH RBC QN AUTO: 30.6 PG (ref 26–34)
MCHC RBC AUTO-ENTMCNC: 33.7 G/DL (ref 31–36)
MCV RBC AUTO: 91.1 FL (ref 80–100)
MONOCYTES # BLD: 0.6 K/UL (ref 0–1.3)
MONOCYTES NFR BLD: 4.1 %
NEUTROPHILS # BLD: 7.5 K/UL (ref 1.7–7.7)
NEUTROPHILS NFR BLD: 52.5 %
PATH INTERP BLD-IMP: NO
PLATELET # BLD AUTO: 313 K/UL (ref 135–450)
PMV BLD AUTO: 6.8 FL (ref 5–10.5)
POTASSIUM SERPL-SCNC: 3.7 MMOL/L (ref 3.5–5.1)
PROT SERPL-MCNC: 6.8 G/DL (ref 6.4–8.2)
RBC # BLD AUTO: 5.01 M/UL (ref 4–5.2)
SODIUM SERPL-SCNC: 139 MMOL/L (ref 136–145)
WBC # BLD AUTO: 14.3 K/UL (ref 4–11)

## 2024-06-27 PROCEDURE — 85025 COMPLETE CBC W/AUTO DIFF WBC: CPT

## 2024-06-27 PROCEDURE — 72158 MRI LUMBAR SPINE W/O & W/DYE: CPT

## 2024-06-27 PROCEDURE — 85652 RBC SED RATE AUTOMATED: CPT

## 2024-06-27 PROCEDURE — 72157 MRI CHEST SPINE W/O & W/DYE: CPT

## 2024-06-27 PROCEDURE — 36415 COLL VENOUS BLD VENIPUNCTURE: CPT

## 2024-06-27 PROCEDURE — 6360000004 HC RX CONTRAST MEDICATION: Performed by: INTERNAL MEDICINE

## 2024-06-27 PROCEDURE — 86140 C-REACTIVE PROTEIN: CPT

## 2024-06-27 PROCEDURE — 80053 COMPREHEN METABOLIC PANEL: CPT

## 2024-06-27 PROCEDURE — A9579 GAD-BASE MR CONTRAST NOS,1ML: HCPCS | Performed by: INTERNAL MEDICINE

## 2024-06-27 RX ADMIN — GADOTERIDOL 20 ML: 279.3 INJECTION, SOLUTION INTRAVENOUS at 15:17

## 2024-06-27 NOTE — PROGRESS NOTES
[facility]  Internal Medicine  Patient Encounter  Kev Villafana D.O., [facility]        Chief Complaint   Patient presents with    Back Pain     Lower back right side.        HPI: 84 y.o. female seen today with complaint of progressively worsening and severe low back pain.  Patient had called the office on 6/17/2024 requesting an antibiotic for a UTI.  Patient reported she was having a low back pain problem and diarrhea and blamed this on a bladder infection.  She was requesting an antibiotic be called in.  She was told just simply calling in an antibiotic was not appropriate.  We recommended that she go get evaluated.  She did in fact go to the emergency department on 6/17/2024.  At that time she reported that the low back pain radiated into her lower abdomen.  She had no numbness or tingling or pain going down the legs.  She did indicate that her low back pain was severe.  She did not have any fever or chills.  Her blood pressure was 158/95.  The patient was sent for a CT scan of the abdomen and pelvis and a UA CNS was obtained.  Her urinalysis at that time showed positive nitrites, large leukocyte esterase, 2+ bacteria, and 31 WBCs per high-powered field.  She was placed on an antibiotic.  Her urine culture was positive for E. coli greater than 100,000 CFU's per mL.    Her CT scan of the abdomen and pelvis showed no acute intra-abdominal or intrapelvic findings.  There was evidence of grade 1 degenerative anterolisthesis of L4 on L5.  There were no soft tissue or osseous structure abnormalities.    The patient had called central scheduling and was adamant that I had to order a DEXA scan and an MRI.  I insisted that I would not be ordering anything until she was evaluated.    Her last lumbar x-ray back in 2019 showed degenerative disc disease, facet arthropathy particularly at L4-L5.    When asked to locate her pain, she actually points to the lower thoracic right at the thoracolumbar junction.  She has

## 2024-06-28 ENCOUNTER — TELEPHONE (OUTPATIENT)
Dept: INTERNAL MEDICINE CLINIC | Age: 84
End: 2024-06-28

## 2024-06-28 ENCOUNTER — TELEPHONE (OUTPATIENT)
Dept: INTERVENTIONAL RADIOLOGY/VASCULAR | Age: 84
End: 2024-06-28

## 2024-06-28 DIAGNOSIS — S22.000A CLOSED COMPRESSION FRACTURE OF THORACIC VERTEBRA, INITIAL ENCOUNTER (HCC): Primary | ICD-10-CM

## 2024-06-28 RX ORDER — TRAMADOL HYDROCHLORIDE 50 MG/1
25-50 TABLET ORAL EVERY 8 HOURS PRN
Qty: 21 TABLET | Refills: 0 | Status: SHIPPED | OUTPATIENT
Start: 2024-06-28 | End: 2024-07-05

## 2024-06-28 NOTE — TELEPHONE ENCOUNTER
Spoke to patient to inform her that we are going to cancel the dexa bone scan for now. She also confirmed she has her appointment with IR on Wednesday July 3 at 8:30. She had previously been informed of her results.

## 2024-06-28 NOTE — TELEPHONE ENCOUNTER
----- Message from Kev Villafana DO sent at 6/28/2024 11:08 AM EDT -----  I already placed a referral for IR for kyphoplasty.  I was told that it goes into a pool for them to call the patient.  I am not confident that that will take place.  Please help patient figure out what she needs to do.  We might have to call interventional radiology at [facility].  Cancel DEXA scan for now  ----- Message -----  From: Argelia Sommer MA  Sent: 6/28/2024  10:44 AM EDT  To: Kev Villafana, DO    Does a referral need to be ordered for IR radiology? Also she has a DEXA bone scan scheduled for July 2nd. Should she reschedule that for a little later?  ----- Message -----  From: Kev Villafana DO  Sent: 6/27/2024  10:50 PM EDT  To: [facility]    Discussed with pt and .  Patient with thoracic compression fracture (T12).  Needs referral to interventional radiology for consideration of Kyphoplasty.  Need to get her scheduled for consult with IR at [facility].

## 2024-06-28 NOTE — TELEPHONE ENCOUNTER
Patient's  is calling in for  in regards to yesterday's office visit.  is wanting to know if  was going to prescribe her medication due to previous conversation.Per patient has not gotten a call from pharmacy letting them know it has been called in.     Please advise.

## 2024-07-03 ENCOUNTER — HOSPITAL ENCOUNTER (OUTPATIENT)
Dept: INTERVENTIONAL RADIOLOGY/VASCULAR | Age: 84
Discharge: HOME | End: 2024-07-03
Payer: MEDICARE

## 2024-07-03 VITALS
SYSTOLIC BLOOD PRESSURE: 139 MMHG | OXYGEN SATURATION: 97 % | TEMPERATURE: 98.3 F | HEART RATE: 81 BPM | HEIGHT: 62 IN | BODY MASS INDEX: 33.49 KG/M2 | DIASTOLIC BLOOD PRESSURE: 78 MMHG | RESPIRATION RATE: 14 BRPM | WEIGHT: 182 LBS

## 2024-07-03 DIAGNOSIS — S22.080G COMPRESSION FRACTURE OF T12 VERTEBRA WITH DELAYED HEALING, SUBSEQUENT ENCOUNTER: ICD-10-CM

## 2024-07-03 DIAGNOSIS — S22.080A COMPRESSION FRACTURE OF T12 VERTEBRA, INITIAL ENCOUNTER (HCC): ICD-10-CM

## 2024-07-03 LAB
ANION GAP SERPL CALCULATED.3IONS-SCNC: 12 MMOL/L (ref 3–16)
BUN SERPL-MCNC: 21 MG/DL (ref 7–20)
CALCIUM SERPL-MCNC: 9.3 MG/DL (ref 8.3–10.6)
CHLORIDE SERPL-SCNC: 101 MMOL/L (ref 99–110)
CO2 SERPL-SCNC: 25 MMOL/L (ref 21–32)
CREAT SERPL-MCNC: 0.6 MG/DL (ref 0.6–1.2)
DEPRECATED RDW RBC AUTO: 13.7 % (ref 12.4–15.4)
GFR SERPLBLD CREATININE-BSD FMLA CKD-EPI: 88 ML/MIN/{1.73_M2}
GLUCOSE SERPL-MCNC: 105 MG/DL (ref 70–99)
HCT VFR BLD AUTO: 46.2 % (ref 36–48)
HGB BLD-MCNC: 15.4 G/DL (ref 12–16)
INR PPP: 1.02 (ref 0.85–1.15)
MCH RBC QN AUTO: 31 PG (ref 26–34)
MCHC RBC AUTO-ENTMCNC: 33.3 G/DL (ref 31–36)
MCV RBC AUTO: 93 FL (ref 80–100)
PLATELET # BLD AUTO: 293 K/UL (ref 135–450)
PMV BLD AUTO: 7 FL (ref 5–10.5)
POTASSIUM SERPL-SCNC: 4 MMOL/L (ref 3.5–5.1)
PROTHROMBIN TIME: 13.6 SEC (ref 11.9–14.9)
RBC # BLD AUTO: 4.97 M/UL (ref 4–5.2)
SODIUM SERPL-SCNC: 138 MMOL/L (ref 136–145)
WBC # BLD AUTO: 16.6 K/UL (ref 4–11)

## 2024-07-03 PROCEDURE — 87186 SC STD MICRODIL/AGAR DIL: CPT

## 2024-07-03 PROCEDURE — 85027 COMPLETE CBC AUTOMATED: CPT

## 2024-07-03 PROCEDURE — 87077 CULTURE AEROBIC IDENTIFY: CPT

## 2024-07-03 PROCEDURE — 80048 BASIC METABOLIC PNL TOTAL CA: CPT

## 2024-07-03 PROCEDURE — 36415 COLL VENOUS BLD VENIPUNCTURE: CPT

## 2024-07-03 PROCEDURE — 87086 URINE CULTURE/COLONY COUNT: CPT

## 2024-07-03 PROCEDURE — 87040 BLOOD CULTURE FOR BACTERIA: CPT

## 2024-07-03 PROCEDURE — 85610 PROTHROMBIN TIME: CPT

## 2024-07-03 ASSESSMENT — PAIN - FUNCTIONAL ASSESSMENT: PAIN_FUNCTIONAL_ASSESSMENT: NONE - DENIES PAIN

## 2024-07-03 NOTE — PROGRESS NOTES
Patient here for outpatient kyphoplasty. WBC elevated. Blood cultures and urinalysis ordered. Will follow along with patient and reschedule once blood cultures are negative.

## 2024-07-05 ENCOUNTER — TELEPHONE (OUTPATIENT)
Dept: INTERVENTIONAL RADIOLOGY/VASCULAR | Age: 84
End: 2024-07-05

## 2024-07-05 ENCOUNTER — TELEPHONE (OUTPATIENT)
Dept: INTERNAL MEDICINE CLINIC | Age: 84
End: 2024-07-05

## 2024-07-05 DIAGNOSIS — S22.000A CLOSED COMPRESSION FRACTURE OF THORACIC VERTEBRA, INITIAL ENCOUNTER (HCC): ICD-10-CM

## 2024-07-05 DIAGNOSIS — B95.2 ENTEROCOCCUS UTI: Primary | ICD-10-CM

## 2024-07-05 DIAGNOSIS — N39.0 ENTEROCOCCUS UTI: Primary | ICD-10-CM

## 2024-07-05 LAB
BACTERIA UR CULT: ABNORMAL
ORGANISM: ABNORMAL

## 2024-07-05 RX ORDER — CIPROFLOXACIN 500 MG/1
500 TABLET, FILM COATED ORAL 2 TIMES DAILY
COMMUNITY
End: 2024-07-08 | Stop reason: ALTCHOICE

## 2024-07-05 RX ORDER — CIPROFLOXACIN 500 MG/1
500 TABLET, FILM COATED ORAL 2 TIMES DAILY
Qty: 20 TABLET | Refills: 0 | OUTPATIENT
Start: 2024-07-05

## 2024-07-05 RX ORDER — TRAMADOL HYDROCHLORIDE 50 MG/1
25-50 TABLET ORAL EVERY 8 HOURS PRN
Qty: 21 TABLET | Refills: 0 | OUTPATIENT
Start: 2024-07-05 | End: 2024-07-12

## 2024-07-05 RX ORDER — TRAMADOL HYDROCHLORIDE 50 MG/1
25-50 TABLET ORAL EVERY 8 HOURS PRN
Qty: 21 TABLET | Refills: 0 | Status: SHIPPED | OUTPATIENT
Start: 2024-07-05 | End: 2024-07-12

## 2024-07-05 RX ORDER — NITROFURANTOIN 25; 75 MG/1; MG/1
100 CAPSULE ORAL 2 TIMES DAILY
Qty: 20 CAPSULE | Refills: 0 | Status: ON HOLD | OUTPATIENT
Start: 2024-07-05 | End: 2024-07-11 | Stop reason: HOSPADM

## 2024-07-05 NOTE — TELEPHONE ENCOUNTER
Patient is calling in for  in regards to lab work she has done when trying to get the kyphoplasty. Per patient she had urine test done before attempting to start the procedure ( procedure has been rescheduled 2 times due to labs). Per patient procedure was set to be done at [facility] in the radiology department.     Per patient was advised to call 's office to get treated first before being able to reschedule appt. Per patient has been over the counter medicating with BRAGS apple cider vinegar ( not advised by a provider), Took 1 dosage yesterday in the afternoon and 1 at night time. Patient also took 1 dosage this morning.     No appointment's available today, patient dosnt have mychart available.     Please advise.

## 2024-07-05 NOTE — TELEPHONE ENCOUNTER
Patient is calling in for  in regards to medication refill for :    traMADol (ULTRAM) 50 MG tablet   Last appointment: 6/27/2024  Next appointment: 11/29/2024  Last refill: 6/28/24      ciprofloxacin (CIPRO) tablet 500 mg   Last appointment: 6/27/2024  Next appointment: 11/29/2024  Last refill: 6/18/24      Please send medication refill to :    [facility] #[id number] [address] - [place] [phone] - F [phone]

## 2024-07-05 NOTE — TELEPHONE ENCOUNTER
Message left with patient and MD, urine is positive for infection.  Unable to complete kyphoplasty today

## 2024-07-05 NOTE — TELEPHONE ENCOUNTER
Tell patient to stop taking apple cider vinegar!  Start Macrobid 1 p.o. twice daily for 10 days.  Please ale up medicine

## 2024-07-05 NOTE — PROGRESS NOTES
Unable to perform kyphoplasty today, as patient's urine is positive for infection.  Message left with patient, and , message also left at Dr. Villafana's office.

## 2024-07-07 LAB
BACTERIA BLD CULT ORG #2: NORMAL
BACTERIA BLD CULT: NORMAL

## 2024-07-08 ENCOUNTER — CARE COORDINATION (OUTPATIENT)
Dept: CARE COORDINATION | Age: 84
End: 2024-07-08

## 2024-07-08 DIAGNOSIS — B95.2 ENTEROCOCCUS UTI: Primary | ICD-10-CM

## 2024-07-08 DIAGNOSIS — N39.0 ENTEROCOCCUS UTI: Primary | ICD-10-CM

## 2024-07-08 NOTE — CARE COORDINATION
Ambulatory Care Coordination Note     2024 4:10 PM     Patient Current Location:  Home: [address] [place] [id number]     ACM contacted the patient by telephone. Verified name and  with patient as identifiers.         ACM: Sara Xie RN     Challenges to be reviewed by the provider   Additional needs identified to be addressed with provider Yes  Pt feeling better.  Back still very painful  Had at cancel kyphoplasty last week due to UTI   Has been taking Macrobid since 7/3  Would like to have urine rechecked so she can reschedule Kyphoplasty.    Would like to go on WEd this week    On   flying to [place]  On  pt has a [employer] party planned for 14 and does not want to have to cancel...    Was not taking D- Mannotose 1000 mg bid, but has been taking Cran supp.   She did not know the spelling of D- mannotose-- provided information           Method of communication with provider: chart routing.    Care Summary Note:     Offered patient enrollment in the Remote Patient Monitoring (RPM) program for in-home monitoring: Yes, but did not enroll at this time: already monitoring with home equipment.     Assessments Completed:   General Assessment    Do you have any symptoms that are causing concern?: Yes  Progression since Onset: Unchanged  Reported Symptoms: Pain          Medications Reviewed:   Completed during a previous call     Advance Care Planning:   Not reviewed during this call     Care Planning:   Not completed during this call    PCP/Specialist follow up:   Future Appointments         Provider Specialty Dept Phone    2024 2:45 PM Dianne London APRN - CNP Cardiology [phone]    2024 3:30 PM (Arrive by 3:15 PM) Kev Villafana DO Internal Medicine [phone]            Follow Up:   Plan for next ACM outreach in approximately 1-2 days  to complete:  - labs .   Patient  is agreeable to this plan.

## 2024-07-08 NOTE — CARE COORDINATION
Pt reports that at the hospital they did a catheter for urine sample because she couldn't provide clean catch specimen  Would you prefer her go to hospital or ?                        /

## 2024-07-09 ENCOUNTER — TELEPHONE (OUTPATIENT)
Dept: INTERNAL MEDICINE CLINIC | Age: 84
End: 2024-07-09

## 2024-07-09 ENCOUNTER — ANESTHESIA (OUTPATIENT)
Dept: ENDOSCOPY | Age: 84
End: 2024-07-09
Payer: MEDICARE

## 2024-07-09 ENCOUNTER — APPOINTMENT (OUTPATIENT)
Dept: GENERAL RADIOLOGY | Age: 84
End: 2024-07-09
Payer: MEDICARE

## 2024-07-09 ENCOUNTER — APPOINTMENT (OUTPATIENT)
Dept: CT IMAGING | Age: 84
End: 2024-07-09
Payer: MEDICARE

## 2024-07-09 ENCOUNTER — HOSPITAL ENCOUNTER (INPATIENT)
Age: 84
LOS: 2 days | Discharge: HOME | End: 2024-07-11
Attending: EMERGENCY MEDICINE | Admitting: INTERNAL MEDICINE
Payer: MEDICARE

## 2024-07-09 ENCOUNTER — ANESTHESIA EVENT (OUTPATIENT)
Dept: ENDOSCOPY | Age: 84
End: 2024-07-09
Payer: MEDICARE

## 2024-07-09 DIAGNOSIS — K92.2 UGIB (UPPER GASTROINTESTINAL BLEED): Primary | ICD-10-CM

## 2024-07-09 DIAGNOSIS — K92.0 HEMATEMESIS, UNSPECIFIED WHETHER NAUSEA PRESENT: ICD-10-CM

## 2024-07-09 PROBLEM — E87.20 LACTIC ACIDOSIS: Status: ACTIVE | Noted: 2024-07-09

## 2024-07-09 LAB
ABO + RH BLD: NORMAL
ALBUMIN SERPL-MCNC: 3.9 G/DL (ref 3.4–5)
ALBUMIN/GLOB SERPL: 1.4 {RATIO} (ref 1.1–2.2)
ALP SERPL-CCNC: 101 U/L (ref 40–129)
ALT SERPL-CCNC: 21 U/L (ref 10–40)
ANION GAP SERPL CALCULATED.3IONS-SCNC: 15 MMOL/L (ref 3–16)
AST SERPL-CCNC: 19 U/L (ref 15–37)
BACTERIA URNS QL MICRO: NORMAL /HPF
BASOPHILS # BLD: 0 K/UL (ref 0–0.2)
BASOPHILS NFR BLD: 0 %
BILIRUB SERPL-MCNC: 0.7 MG/DL (ref 0–1)
BILIRUB UR QL STRIP.AUTO: NEGATIVE
BLD GP AB SCN SERPL QL: NORMAL
BUN SERPL-MCNC: 50 MG/DL (ref 7–20)
C DIFF TOX A+B STL QL IA: ABNORMAL
CALCIUM SERPL-MCNC: 9.1 MG/DL (ref 8.3–10.6)
CHLORIDE SERPL-SCNC: 104 MMOL/L (ref 99–110)
CLARITY UR: CLEAR
CO2 SERPL-SCNC: 20 MMOL/L (ref 21–32)
COLOR UR: YELLOW
CREAT SERPL-MCNC: 0.7 MG/DL (ref 0.6–1.2)
DEPRECATED RDW RBC AUTO: 13.3 % (ref 12.4–15.4)
EKG ATRIAL RATE: 111 BPM
EKG DIAGNOSIS: NORMAL
EKG P AXIS: 60 DEGREES
EKG P-R INTERVAL: 180 MS
EKG Q-T INTERVAL: 308 MS
EKG QRS DURATION: 84 MS
EKG QTC CALCULATION (BAZETT): 418 MS
EKG R AXIS: -46 DEGREES
EKG T AXIS: 86 DEGREES
EKG VENTRICULAR RATE: 111 BPM
EOSINOPHIL # BLD: 0.2 K/UL (ref 0–0.6)
EOSINOPHIL NFR BLD: 1 %
EPI CELLS #/AREA URNS AUTO: 0 /HPF (ref 0–5)
GFR SERPLBLD CREATININE-BSD FMLA CKD-EPI: 85 ML/MIN/{1.73_M2}
GLUCOSE SERPL-MCNC: 123 MG/DL (ref 70–99)
GLUCOSE UR STRIP.AUTO-MCNC: NEGATIVE MG/DL
HCT VFR BLD AUTO: 33.1 % (ref 36–48)
HCT VFR BLD AUTO: 35.5 % (ref 36–48)
HCT VFR BLD AUTO: 36.8 % (ref 36–48)
HCT VFR BLD AUTO: 42.6 % (ref 36–48)
HEMOCCULT STL QL: ABNORMAL
HGB BLD-MCNC: 11.3 G/DL (ref 12–16)
HGB BLD-MCNC: 12.1 G/DL (ref 12–16)
HGB BLD-MCNC: 12.2 G/DL (ref 12–16)
HGB BLD-MCNC: 14.5 G/DL (ref 12–16)
HGB UR QL STRIP.AUTO: ABNORMAL
HYALINE CASTS #/AREA URNS AUTO: 0 /LPF (ref 0–8)
INR PPP: 1.14 (ref 0.85–1.15)
KETONES UR STRIP.AUTO-MCNC: NEGATIVE MG/DL
LACTATE BLDV-SCNC: 1.7 MMOL/L (ref 0.4–1.9)
LACTATE BLDV-SCNC: 2.9 MMOL/L (ref 0.4–1.9)
LACTATE BLDV-SCNC: 3.3 MMOL/L (ref 0.4–1.9)
LEUKOCYTE ESTERASE UR QL STRIP.AUTO: ABNORMAL
LIPASE SERPL-CCNC: 18 U/L (ref 13–60)
LYMPHOCYTES # BLD: 4.9 K/UL (ref 1–5.1)
LYMPHOCYTES NFR BLD: 24 %
MCH RBC QN AUTO: 31.4 PG (ref 26–34)
MCHC RBC AUTO-ENTMCNC: 34 G/DL (ref 31–36)
MCV RBC AUTO: 92.2 FL (ref 80–100)
MONOCYTES # BLD: 1.1 K/UL (ref 0–1.3)
MONOCYTES NFR BLD: 6 %
NEUTROPHILS # BLD: 12.7 K/UL (ref 1.7–7.7)
NEUTROPHILS NFR BLD: 67 %
NITRITE UR QL STRIP.AUTO: NEGATIVE
PATH INTERP BLD-IMP: NO
PH UR STRIP.AUTO: 6 [PH] (ref 5–8)
PLATELET # BLD AUTO: 304 K/UL (ref 135–450)
PLATELET BLD QL SMEAR: ADEQUATE
PMV BLD AUTO: 7.2 FL (ref 5–10.5)
POTASSIUM SERPL-SCNC: 4.1 MMOL/L (ref 3.5–5.1)
PROT SERPL-MCNC: 6.6 G/DL (ref 6.4–8.2)
PROT UR STRIP.AUTO-MCNC: NEGATIVE MG/DL
PROTHROMBIN TIME: 14.9 SEC (ref 11.9–14.9)
RBC # BLD AUTO: 4.61 M/UL (ref 4–5.2)
RBC CLUMPS #/AREA URNS AUTO: 0 /HPF (ref 0–4)
RBC MORPH BLD: NORMAL
SLIDE REVIEW: ABNORMAL
SODIUM SERPL-SCNC: 139 MMOL/L (ref 136–145)
SP GR UR STRIP.AUTO: 1.01 (ref 1–1.03)
UA COMPLETE W REFLEX CULTURE PNL UR: ABNORMAL
UA DIPSTICK W REFLEX MICRO PNL UR: YES
URN SPEC COLLECT METH UR: ABNORMAL
UROBILINOGEN UR STRIP-ACNC: 0.2 E.U./DL
VARIANT LYMPHS NFR BLD MANUAL: 2 % (ref 0–6)
WBC # BLD AUTO: 18.9 K/UL (ref 4–11)
WBC #/AREA URNS AUTO: 1 /HPF (ref 0–5)

## 2024-07-09 PROCEDURE — 82270 OCCULT BLOOD FECES: CPT

## 2024-07-09 PROCEDURE — 87324 CLOSTRIDIUM AG IA: CPT

## 2024-07-09 PROCEDURE — 2580000003 HC RX 258: Performed by: INTERNAL MEDICINE

## 2024-07-09 PROCEDURE — 71045 X-RAY EXAM CHEST 1 VIEW: CPT

## 2024-07-09 PROCEDURE — 0W3P8ZZ CONTROL BLEEDING IN GASTROINTESTINAL TRACT, VIA NATURAL OR ARTIFICIAL OPENING ENDOSCOPIC: ICD-10-PCS | Performed by: INTERNAL MEDICINE

## 2024-07-09 PROCEDURE — 99285 EMERGENCY DEPT VISIT HI MDM: CPT

## 2024-07-09 PROCEDURE — 6360000002 HC RX W HCPCS: Performed by: NURSE ANESTHETIST, CERTIFIED REGISTERED

## 2024-07-09 PROCEDURE — 2720000010 HC SURG SUPPLY STERILE: Performed by: INTERNAL MEDICINE

## 2024-07-09 PROCEDURE — 2060000000 HC ICU INTERMEDIATE R&B

## 2024-07-09 PROCEDURE — 6360000002 HC RX W HCPCS: Performed by: EMERGENCY MEDICINE

## 2024-07-09 PROCEDURE — 6370000000 HC RX 637 (ALT 250 FOR IP): Performed by: INTERNAL MEDICINE

## 2024-07-09 PROCEDURE — 85610 PROTHROMBIN TIME: CPT

## 2024-07-09 PROCEDURE — 87040 BLOOD CULTURE FOR BACTERIA: CPT

## 2024-07-09 PROCEDURE — 7100000001 HC PACU RECOVERY - ADDTL 15 MIN: Performed by: INTERNAL MEDICINE

## 2024-07-09 PROCEDURE — 80053 COMPREHEN METABOLIC PANEL: CPT

## 2024-07-09 PROCEDURE — 2500000003 HC RX 250 WO HCPCS: Performed by: NURSE ANESTHETIST, CERTIFIED REGISTERED

## 2024-07-09 PROCEDURE — 2580000003 HC RX 258: Performed by: NURSE ANESTHETIST, CERTIFIED REGISTERED

## 2024-07-09 PROCEDURE — 81001 URINALYSIS AUTO W/SCOPE: CPT

## 2024-07-09 PROCEDURE — 86901 BLOOD TYPING SEROLOGIC RH(D): CPT

## 2024-07-09 PROCEDURE — 93005 ELECTROCARDIOGRAM TRACING: CPT | Performed by: EMERGENCY MEDICINE

## 2024-07-09 PROCEDURE — 3609012400 HC EGD TRANSORAL BIOPSY SINGLE/MULTIPLE: Performed by: INTERNAL MEDICINE

## 2024-07-09 PROCEDURE — 2709999900 HC NON-CHARGEABLE SUPPLY: Performed by: INTERNAL MEDICINE

## 2024-07-09 PROCEDURE — 83690 ASSAY OF LIPASE: CPT

## 2024-07-09 PROCEDURE — 86850 RBC ANTIBODY SCREEN: CPT

## 2024-07-09 PROCEDURE — 87449 NOS EACH ORGANISM AG IA: CPT

## 2024-07-09 PROCEDURE — 87506 IADNA-DNA/RNA PROBE TQ 6-11: CPT

## 2024-07-09 PROCEDURE — 36415 COLL VENOUS BLD VENIPUNCTURE: CPT

## 2024-07-09 PROCEDURE — 85018 HEMOGLOBIN: CPT

## 2024-07-09 PROCEDURE — 6360000002 HC RX W HCPCS: Performed by: INTERNAL MEDICINE

## 2024-07-09 PROCEDURE — 3700000001 HC ADD 15 MINUTES (ANESTHESIA): Performed by: INTERNAL MEDICINE

## 2024-07-09 PROCEDURE — 2580000003 HC RX 258: Performed by: EMERGENCY MEDICINE

## 2024-07-09 PROCEDURE — 86900 BLOOD TYPING SEROLOGIC ABO: CPT

## 2024-07-09 PROCEDURE — 83605 ASSAY OF LACTIC ACID: CPT

## 2024-07-09 PROCEDURE — 85014 HEMATOCRIT: CPT

## 2024-07-09 PROCEDURE — 88305 TISSUE EXAM BY PATHOLOGIST: CPT

## 2024-07-09 PROCEDURE — 1200000000 HC SEMI PRIVATE

## 2024-07-09 PROCEDURE — 0DB78ZX EXCISION OF STOMACH, PYLORUS, VIA NATURAL OR ARTIFICIAL OPENING ENDOSCOPIC, DIAGNOSTIC: ICD-10-PCS | Performed by: INTERNAL MEDICINE

## 2024-07-09 PROCEDURE — 74176 CT ABD & PELVIS W/O CONTRAST: CPT

## 2024-07-09 PROCEDURE — 93010 ELECTROCARDIOGRAM REPORT: CPT | Performed by: INTERNAL MEDICINE

## 2024-07-09 PROCEDURE — 3609013000 HC EGD TRANSORAL CONTROL BLEEDING ANY METHOD: Performed by: INTERNAL MEDICINE

## 2024-07-09 PROCEDURE — 7100000000 HC PACU RECOVERY - FIRST 15 MIN: Performed by: INTERNAL MEDICINE

## 2024-07-09 PROCEDURE — 3700000000 HC ANESTHESIA ATTENDED CARE: Performed by: INTERNAL MEDICINE

## 2024-07-09 PROCEDURE — 85025 COMPLETE CBC W/AUTO DIFF WBC: CPT

## 2024-07-09 RX ORDER — 0.9 % SODIUM CHLORIDE 0.9 %
1000 INTRAVENOUS SOLUTION INTRAVENOUS ONCE
Status: DISCONTINUED | OUTPATIENT
Start: 2024-07-09 | End: 2024-07-09

## 2024-07-09 RX ORDER — 0.9 % SODIUM CHLORIDE 0.9 %
500 INTRAVENOUS SOLUTION INTRAVENOUS PRN
Status: DISCONTINUED | OUTPATIENT
Start: 2024-07-09 | End: 2024-07-11 | Stop reason: HOSPADM

## 2024-07-09 RX ORDER — TRAMADOL HYDROCHLORIDE 50 MG/1
50 TABLET ORAL EVERY 8 HOURS PRN
Status: DISCONTINUED | OUTPATIENT
Start: 2024-07-09 | End: 2024-07-11 | Stop reason: HOSPADM

## 2024-07-09 RX ORDER — 0.9 % SODIUM CHLORIDE 0.9 %
30 INTRAVENOUS SOLUTION INTRAVENOUS ONCE
Status: COMPLETED | OUTPATIENT
Start: 2024-07-09 | End: 2024-07-09

## 2024-07-09 RX ORDER — ACETAMINOPHEN 325 MG/1
650 TABLET ORAL EVERY 6 HOURS PRN
Status: DISCONTINUED | OUTPATIENT
Start: 2024-07-09 | End: 2024-07-11 | Stop reason: HOSPADM

## 2024-07-09 RX ORDER — SODIUM CHLORIDE 0.9 % (FLUSH) 0.9 %
5-40 SYRINGE (ML) INJECTION PRN
Status: DISCONTINUED | OUTPATIENT
Start: 2024-07-09 | End: 2024-07-11 | Stop reason: HOSPADM

## 2024-07-09 RX ORDER — SODIUM CHLORIDE 9 MG/ML
INJECTION, SOLUTION INTRAVENOUS CONTINUOUS PRN
Status: DISCONTINUED | OUTPATIENT
Start: 2024-07-09 | End: 2024-07-09 | Stop reason: SDUPTHER

## 2024-07-09 RX ORDER — SODIUM CHLORIDE 9 MG/ML
INJECTION, SOLUTION INTRAVENOUS CONTINUOUS
Status: DISCONTINUED | OUTPATIENT
Start: 2024-07-09 | End: 2024-07-11 | Stop reason: HOSPADM

## 2024-07-09 RX ORDER — PROPOFOL 10 MG/ML
INJECTION, EMULSION INTRAVENOUS PRN
Status: DISCONTINUED | OUTPATIENT
Start: 2024-07-09 | End: 2024-07-09 | Stop reason: SDUPTHER

## 2024-07-09 RX ORDER — CETIRIZINE HYDROCHLORIDE 10 MG/1
10 TABLET ORAL DAILY
Status: DISCONTINUED | OUTPATIENT
Start: 2024-07-09 | End: 2024-07-11 | Stop reason: HOSPADM

## 2024-07-09 RX ORDER — LIDOCAINE HYDROCHLORIDE 20 MG/ML
INJECTION, SOLUTION EPIDURAL; INFILTRATION; INTRACAUDAL; PERINEURAL PRN
Status: DISCONTINUED | OUTPATIENT
Start: 2024-07-09 | End: 2024-07-09 | Stop reason: SDUPTHER

## 2024-07-09 RX ORDER — SODIUM CHLORIDE 0.9 % (FLUSH) 0.9 %
5-40 SYRINGE (ML) INJECTION EVERY 12 HOURS SCHEDULED
Status: DISCONTINUED | OUTPATIENT
Start: 2024-07-09 | End: 2024-07-09 | Stop reason: HOSPADM

## 2024-07-09 RX ORDER — SODIUM CHLORIDE 0.9 % (FLUSH) 0.9 %
5-40 SYRINGE (ML) INJECTION EVERY 12 HOURS SCHEDULED
Status: DISCONTINUED | OUTPATIENT
Start: 2024-07-09 | End: 2024-07-11 | Stop reason: HOSPADM

## 2024-07-09 RX ORDER — POTASSIUM CHLORIDE 20 MEQ/1
40 TABLET, EXTENDED RELEASE ORAL PRN
Status: DISCONTINUED | OUTPATIENT
Start: 2024-07-09 | End: 2024-07-11 | Stop reason: HOSPADM

## 2024-07-09 RX ORDER — MULTIVITAMIN WITH IRON
1 TABLET ORAL DAILY
Status: DISCONTINUED | OUTPATIENT
Start: 2024-07-09 | End: 2024-07-11 | Stop reason: HOSPADM

## 2024-07-09 RX ORDER — ONDANSETRON 2 MG/ML
4 INJECTION INTRAMUSCULAR; INTRAVENOUS
Status: DISCONTINUED | OUTPATIENT
Start: 2024-07-09 | End: 2024-07-09 | Stop reason: ALTCHOICE

## 2024-07-09 RX ORDER — SODIUM CHLORIDE 0.9 % (FLUSH) 0.9 %
5-40 SYRINGE (ML) INJECTION PRN
Status: DISCONTINUED | OUTPATIENT
Start: 2024-07-09 | End: 2024-07-09 | Stop reason: HOSPADM

## 2024-07-09 RX ORDER — FENTANYL CITRATE 50 UG/ML
25 INJECTION, SOLUTION INTRAMUSCULAR; INTRAVENOUS
Status: DISCONTINUED | OUTPATIENT
Start: 2024-07-09 | End: 2024-07-10 | Stop reason: ALTCHOICE

## 2024-07-09 RX ORDER — SODIUM CHLORIDE 9 MG/ML
25 INJECTION, SOLUTION INTRAVENOUS PRN
Status: DISCONTINUED | OUTPATIENT
Start: 2024-07-09 | End: 2024-07-09 | Stop reason: HOSPADM

## 2024-07-09 RX ORDER — ACETAMINOPHEN 650 MG/1
650 SUPPOSITORY RECTAL EVERY 6 HOURS PRN
Status: DISCONTINUED | OUTPATIENT
Start: 2024-07-09 | End: 2024-07-11 | Stop reason: HOSPADM

## 2024-07-09 RX ORDER — POTASSIUM CHLORIDE 7.45 MG/ML
10 INJECTION INTRAVENOUS PRN
Status: DISCONTINUED | OUTPATIENT
Start: 2024-07-09 | End: 2024-07-11 | Stop reason: HOSPADM

## 2024-07-09 RX ORDER — HYDRALAZINE HYDROCHLORIDE 20 MG/ML
10 INJECTION INTRAMUSCULAR; INTRAVENOUS EVERY 6 HOURS PRN
Status: DISCONTINUED | OUTPATIENT
Start: 2024-07-09 | End: 2024-07-11 | Stop reason: HOSPADM

## 2024-07-09 RX ORDER — AMLODIPINE BESYLATE 5 MG/1
5 TABLET ORAL DAILY
Status: DISCONTINUED | OUTPATIENT
Start: 2024-07-09 | End: 2024-07-11 | Stop reason: HOSPADM

## 2024-07-09 RX ORDER — ONDANSETRON 4 MG/1
4 TABLET, ORALLY DISINTEGRATING ORAL EVERY 8 HOURS PRN
Status: DISCONTINUED | OUTPATIENT
Start: 2024-07-09 | End: 2024-07-11 | Stop reason: HOSPADM

## 2024-07-09 RX ORDER — ONDANSETRON 2 MG/ML
4 INJECTION INTRAMUSCULAR; INTRAVENOUS EVERY 6 HOURS PRN
Status: DISCONTINUED | OUTPATIENT
Start: 2024-07-09 | End: 2024-07-11 | Stop reason: HOSPADM

## 2024-07-09 RX ORDER — SODIUM CHLORIDE 9 MG/ML
INJECTION, SOLUTION INTRAVENOUS PRN
Status: DISCONTINUED | OUTPATIENT
Start: 2024-07-09 | End: 2024-07-11 | Stop reason: HOSPADM

## 2024-07-09 RX ORDER — NITROFURANTOIN 25; 75 MG/1; MG/1
100 CAPSULE ORAL 2 TIMES DAILY
Status: DISCONTINUED | OUTPATIENT
Start: 2024-07-09 | End: 2024-07-11 | Stop reason: HOSPADM

## 2024-07-09 RX ADMIN — LIDOCAINE HYDROCHLORIDE 100 MG: 20 INJECTION, SOLUTION EPIDURAL; INFILTRATION; INTRACAUDAL; PERINEURAL at 13:29

## 2024-07-09 RX ADMIN — SODIUM CHLORIDE 80 MG: 9 INJECTION INTRAMUSCULAR; INTRAVENOUS; SUBCUTANEOUS at 09:52

## 2024-07-09 RX ADMIN — CETIRIZINE HYDROCHLORIDE 10 MG: 10 TABLET, FILM COATED ORAL at 16:20

## 2024-07-09 RX ADMIN — SODIUM CHLORIDE: 9 INJECTION, SOLUTION INTRAVENOUS at 16:20

## 2024-07-09 RX ADMIN — PROPOFOL 70 MG: 10 INJECTION, EMULSION INTRAVENOUS at 13:30

## 2024-07-09 RX ADMIN — SODIUM CHLORIDE 2478 ML: 9 INJECTION, SOLUTION INTRAVENOUS at 07:56

## 2024-07-09 RX ADMIN — AMLODIPINE BESYLATE 5 MG: 5 TABLET ORAL at 16:20

## 2024-07-09 RX ADMIN — LIDOCAINE HYDROCHLORIDE 20 MG: 20 INJECTION, SOLUTION EPIDURAL; INFILTRATION; INTRACAUDAL; PERINEURAL at 13:35

## 2024-07-09 RX ADMIN — THERA TABS 1 TABLET: TAB at 16:20

## 2024-07-09 RX ADMIN — PANTOPRAZOLE SODIUM 8 MG/HR: 40 INJECTION, POWDER, FOR SOLUTION INTRAVENOUS at 09:48

## 2024-07-09 RX ADMIN — LIDOCAINE HYDROCHLORIDE 20 MG: 20 INJECTION, SOLUTION EPIDURAL; INFILTRATION; INTRACAUDAL; PERINEURAL at 13:44

## 2024-07-09 RX ADMIN — SODIUM CHLORIDE: 9 INJECTION, SOLUTION INTRAVENOUS at 13:33

## 2024-07-09 RX ADMIN — PANTOPRAZOLE SODIUM 8 MG/HR: 40 INJECTION, POWDER, FOR SOLUTION INTRAVENOUS at 19:38

## 2024-07-09 RX ADMIN — NITROFURANTOIN MONOHYDRATE/MACROCRYSTALS 100 MG: 25; 75 CAPSULE ORAL at 21:29

## 2024-07-09 RX ADMIN — LIDOCAINE HYDROCHLORIDE 30 MG: 20 INJECTION, SOLUTION EPIDURAL; INFILTRATION; INTRACAUDAL; PERINEURAL at 13:39

## 2024-07-09 RX ADMIN — FENTANYL CITRATE 25 MCG: 50 INJECTION INTRAMUSCULAR; INTRAVENOUS at 11:11

## 2024-07-09 ASSESSMENT — PAIN DESCRIPTION - ONSET: ONSET: ON-GOING

## 2024-07-09 ASSESSMENT — PAIN DESCRIPTION - LOCATION: LOCATION: BACK

## 2024-07-09 ASSESSMENT — PAIN SCALES - GENERAL
PAINLEVEL_OUTOF10: 5
PAINLEVEL_OUTOF10: 3
PAINLEVEL_OUTOF10: 2
PAINLEVEL_OUTOF10: 5
PAINLEVEL_OUTOF10: 3

## 2024-07-09 ASSESSMENT — PAIN DESCRIPTION - FREQUENCY: FREQUENCY: CONTINUOUS

## 2024-07-09 ASSESSMENT — PAIN DESCRIPTION - PAIN TYPE: TYPE: CHRONIC PAIN

## 2024-07-09 ASSESSMENT — LIFESTYLE VARIABLES
HOW OFTEN DO YOU HAVE A DRINK CONTAINING ALCOHOL: NEVER
SMOKING_STATUS: 0
HOW MANY STANDARD DRINKS CONTAINING ALCOHOL DO YOU HAVE ON A TYPICAL DAY: PATIENT DOES NOT DRINK

## 2024-07-09 ASSESSMENT — PAIN - FUNCTIONAL ASSESSMENT
PAIN_FUNCTIONAL_ASSESSMENT: 0-10
PAIN_FUNCTIONAL_ASSESSMENT: NONE - DENIES PAIN

## 2024-07-09 ASSESSMENT — PAIN DESCRIPTION - DESCRIPTORS: DESCRIPTORS: ACHING

## 2024-07-09 NOTE — PLAN OF CARE
Problem: Chronic Conditions and Co-morbidities  Goal: Patient's chronic conditions and co-morbidity symptoms are monitored and maintained or improved  Outcome: Progressing     Problem: Pain  Goal: Verbalizes/displays adequate comfort level or baseline comfort level  Outcome: Progressing     Problem: Safety - Adult  Goal: Free from fall injury  Outcome: Progressing     Problem: Discharge Planning  Goal: Discharge to home or other facility with appropriate resources  Outcome: Progressing     Problem: Skin/Tissue Integrity  Goal: Absence of new skin breakdown  Description: 1.  Monitor for areas of redness and/or skin breakdown  2.  Assess vascular access sites hourly  3.  Every 4-6 hours minimum:  Change oxygen saturation probe site  4.  Every 4-6 hours:  If on nasal continuous positive airway pressure, respiratory therapy assess nares and determine need for appliance change or resting period.  Outcome: Progressing     Problem: ABCDS Injury Assessment  Goal: Absence of physical injury  Outcome: Progressing

## 2024-07-09 NOTE — ED PROVIDER NOTES
COLONOSCOPY  12/11/2014    Dr. Blevins    COLONOSCOPY N/A 5/20/2019    COLONOSCOPY WITH BIOPSY performed by Christy Blevins MD at [facility] ENDOSCOPY    COLONOSCOPY  5/20/2019    COLONOSCOPY POLYPECTOMY SNARE/COLD BIOPSY performed by Christy Blevins MD at [facility] ENDOSCOPY    EYE SURGERY      bilateral catarcts removed    FRACTURE SURGERY  10/2011    left wrist    HYSTERECTOMY (CERVIX STATUS UNKNOWN)  1976    total    OVARY REMOVAL  04/1983    PTCA  7/13/2012    3 stents, kissing stents in LAD and stent in the diagonall, Dr. Ragsdale    TONSILLECTOMY      UPPER GASTROINTESTINAL ENDOSCOPY  8/2011    UPPER GASTROINTESTINAL ENDOSCOPY  12/11/2015    Dr. Blevins    WRIST FRACTURE SURGERY  9/27/2011    ORIF left distal radius fracture     Family History   Problem Relation Age of Onset    Heart Disease Mother     Rheum Arthritis Mother     Alcohol Abuse Father     Breast Cancer Maternal Grandmother      Social History     Socioeconomic History    Marital status:      Spouse name: Not on file    Number of children: Not on file    Years of education: Not on file    Highest education level: Not on file   Occupational History    Not on file   Tobacco Use    Smoking status: Never    Smokeless tobacco: Never   Substance and Sexual Activity    Alcohol use: Yes     Comment: rare/once yearly    Drug use: No    Sexual activity: Not on file   Other Topics Concern    Not on file   Social History Narrative    Not on file     Social Determinants of Health     Financial Resource Strain: Low Risk  (8/15/2023)    Overall Financial Resource Strain (CARDIA)     Difficulty of Paying Living Expenses: Not hard at all   Food Insecurity: Not on file (8/15/2023)   Transportation Needs: Unknown (8/15/2023)    PRAPARE - Transportation     Lack of Transportation (Medical): Not on file     Lack of Transportation (Non-Medical): No   Physical Activity: Insufficiently Active (5/29/2024)    Exercise Vital Sign     Days of Exercise per Week: 3 days          CLINICAL IMPRESSION  1. UGIB (upper gastrointestinal bleed)        Blood pressure 125/61, pulse 87, temperature 98.8 °F (37.1 °C), resp. rate 16, height 1.575 m (5' 2\"), weight 82.6 kg (182 lb), SpO2 99 %.    DISPOSITION  Steffanie Chavez was admitted in fair condition.    The plan is to admit to the hospital at this time under the nursing home / SNF admitting service.  Dr. Robertson accepted the patient and will take over the patient's care.    Critical care time:  The total critical care time I independently spent while evaluating and treating this patient was 50 minutes.  This excludes time spent doing separately billable procedures.  This includes time at the bedside, data interpretation, medication management, obtaining critical history from collateral sources if the patient is unable to provide it directly, and physician consultation.  Specifics of interventions taken and potentially life-threatening diagnostic considerations are listed above in the medical decision making.  If this was a shared visit with an AYE, the time in this attestation is non-concurrent critical care time out of the total shared critical care time provided by the AYE and myself.    DISCLAIMER: This chart was created using Dragon dictation software.  Efforts were made by me to ensure accuracy, however some errors may be present due to limitations of this technology and occasionally words are not transcribed correctly.          Jason Sims MD  07/09/24 [id number]

## 2024-07-09 NOTE — ANESTHESIA PRE PROCEDURE
Department of Anesthesiology  Preprocedure Note       Name:  Steffanie Chavez   Age:  84 y.o.  :  1940                                          MRN:  [id number]         Date:  2024      Surgeon: Surgeon(s):  Hussein Rasheed MD    Procedure: ESOPHAGOGASTRODUODENOSCOPY DIAGNOSTIC ONLY (Abdomen)    Medications prior to admission:   Prior to Admission medications    Medication Sig Start Date End Date Taking? Authorizing Provider   nitrofurantoin, macrocrystal-monohydrate, (MACROBID) 100 MG capsule Take 1 capsule by mouth 2 times daily for 10 days 7/5/24 7/15/24  Kev Villafana DO   traMADol (ULTRAM) 50 MG tablet Take 0.5-1 tablets by mouth every 8 hours as needed for Pain for up to 7 days. Max Daily Amount: 150 mg 24  Kev Villafana DO   D-Mannose 500 MG CAPS Take 1 Capful by mouth in the morning and at bedtime 24   Kev Villafana DO   amLODIPine (NORVASC) 5 MG tablet Take 1 tablet by mouth daily 24   Kev Villafana DO   turmeric 500 MG CAPS Take by mouth daily    Jeremy Spicer MD   Ginkgo Biloba 40 MG TABS Take by mouth    Jeremy Spicer MD   Glucosamine 500 MG CAPS Take by mouth    Jeremy Spicer MD   Probiotic Product (PROBIOTIC-10 PO) Take by mouth    Jeremy Spicer MD   NONFORMULARY daily Liver vitamin    Jeremy Spicer MD   levocetirizine (XYZAL) 5 MG tablet Take 1 tablet by mouth nightly as needed 10/26/21   Kev Villafana DO   magnesium 30 MG tablet Take 1 tablet by mouth 2 times daily    Jeremy Spicer MD   Coenzyme Q10 (COQ10) 100 MG CAPS Take by mouth as needed    Jeremy Spicer MD   Vitamin D-Vitamin K (K2 PLUS D3 PO) Take by mouth    Jeremy Spicer MD   Multiple Vitamins-Minerals (MULTIVITAMIN ADULT PO) Take by mouth    Jeremy Spicer MD   Cranberry 125 MG TABS Take by mouth    Jeremy Spicer MD   Phytosterol Esters (CHOLEST CARE PO) Take  by mouth.    Jeremy Spicer MD       Current

## 2024-07-09 NOTE — PROGRESS NOTES
Pt admitted for gi bleed, lactic acidosis    Full h+p to follow    Active Hospital Problems    Diagnosis Date Noted    GI bleed [K92.2] 07/09/2024    Lactic acidosis [E87.20] 07/09/2024    Chronic diastolic CHF (congestive heart failure) (HCC) [I50.32] 06/18/2021    Benign essential HTN [I10] 10/11/2013    Hyperlipidemia [E78.5] 06/12/2013       Please use [employer] to contact me with any questions during the day.   The hospitalist service will provide cross-coverage for this patient from 7pm to 7am.    During those hours, contact the on-call hospitalist MD/AYE for questions.

## 2024-07-09 NOTE — ED NOTES
Pt to ED from home by [facility] for c/o threw up dark/black liquidy vomit around 5x tonight and also had diarrhea, has some other chronic conditions such as back pain and dealing with a UTI pt states but that the only thing that is new is the diarrhea and emesis tonight, denies nausea or abd pain at this time. Warm blankets provided per request.

## 2024-07-09 NOTE — ANESTHESIA POSTPROCEDURE EVALUATION
Department of Anesthesiology  Postprocedure Note    Patient: Steffanie Chavez  MRN: [id number]  YOB: 1940  Date of evaluation: 7/9/2024    Procedure Summary       Date: 07/09/24 Room / Location: [place] [address] / [facility]    Anesthesia Start: 1327 Anesthesia Stop: 1357    Procedures:       ESOPHAGOGASTRODUODENOSCOPY BIOPSY (Abdomen)      ESOPHAGOGASTRODUODENOSCOPY CONTROL HEMORRHAGE (Abdomen) Diagnosis:       Hematemesis, unspecified whether nausea present      (Hematemesis, unspecified whether nausea present [K92.0])    Surgeons: Hussein Rasheed MD Responsible Provider: Xuan Gunter MD    Anesthesia Type: MAC, general ASA Status: 3 - Emergent            Anesthesia Type: No value filed.    Cristal Phase I: Cristal Score: 9    Cristal Phase II:      Anesthesia Post Evaluation    Patient location during evaluation: bedside  Patient participation: complete - patient participated  Level of consciousness: awake and alert  Pain score: 2  Airway patency: patent  Nausea & Vomiting: no vomiting  Cardiovascular status: hemodynamically stable  Respiratory status: nonlabored ventilation  Hydration status: stable  Multimodal analgesia pain management approach  Pain management: adequate    No notable events documented.

## 2024-07-09 NOTE — TELEPHONE ENCOUNTER
called to advise Dr. Villafana that they took patient to the ER for bowel issues and she is currently admitted at [facility]

## 2024-07-09 NOTE — H&P
History and Physical  Dr. Robertson  7/9/2024    PCP: Kev Villafana DO    Cc:   Chief Complaint   Patient presents with    Hematemesis     Pt arrived [facility] from home from with complaints of dark vomiting and dark liquid diarrhea that started today at 2am.       HPI:  Steffanie Chavez is a 84 y.o. female who has a past medical history of Allergic rhinitis, Anesthesia, Arthropathy of cervical facet joint, CAD (coronary artery disease), Chronic diastolic CHF (congestive heart failure) (HCC), CLL (chronic lymphocytic leukemia) (HCC), DDD (degenerative disc disease), cervical, Diverticulosis, Fatty liver, Foraminal stenosis of cervical region, GERD (gastroesophageal reflux disease), Hepatomegaly, Hyperlipidemia, Hypertension, Impaired fasting glucose, Kidney stones, MI (myocardial infarction) (HCC), Movement disorder, Obesity, Osteopenia, Osteoporosis, Right carpal tunnel syndrome, Ulnar neuropathy at elbow of right upper extremity, and Urge incontinence.     Patient presents with GI bleed.  HPI  (1-3 for expanded problem focused, ?4 for detailed/comprehensive)     84 y.o. female  who presents to the ED complaining of onset early this morning around 2 AM of dark vomiting with dark-colored \"chocolate syrup\" diarrhea.  She has never had symptoms like this before.  She does have some generalized abdominal cramping with it.  She denies any chest pain or shortness of breath.  She arrives by EMS.  She says that she is not anticoagulated and does not drink alcohol.  She is currently being treated with an antibiotic.  No syncope.  She has chronic but not acute back pain today     IV fluids were administered due to a transient episode of hypotension that was fluid responsive.  Patient has a leukocytosis of 18.9 and a lactate of 3.3 but I suspect that these are reactive from her vomiting and not related to sepsis.  Her hemoglobin is stable at this time, so transfusion is not warranted.     Case d/w GI  They recommend iv ppi and  placed in inpatient status with the expectation of requiring a hospital stay spanning at least two midnights for care and treatment of the problems noted in the problem list.  This determination is also based on thepatients comorbidities and past medical history, the severity and timing of the signs and symptoms upon presentation.    (Please note that portions of this note were completed with a voice recognition program.  Efforts were made to edit the dictations but occasionally words are mis-transcribed.)      Electronically signed by: Brandon Robertson MD 7/9/2024    Please use [employer] to contact me with any questions during the day.   The hospitalist service will provide cross-coverage for this patient from 7pm to 7am.    During those hours, contact the on-call hospitalist MD/AYE for questions.

## 2024-07-09 NOTE — ED NOTES
How does patient ambulate?   []Low Fall Risk (ambulates by themselves without support)  [x]Stand by assist   []Contact Guard   []Front wheel walker  []Wheelchair   []Steady  []Bed bound  []History of Lower Extremity Amputation  []Unknown, did not assess in the emergency department   How does patient take pills?  [x]Whole with Water  []Crushed in applesauce  []Crushed in pudding  []Other  []Unknown no oral medications were given in the ED  Is patient alert?   [x]Alert  []Drowsy but responds to voice  []Doesn't respond to voice but responds to painful stimuli  []Unresponsive  Is patient oriented?   [x]To person  [x]To place  [x]To time  [x]To situation  []Confused  []Agitated  []Follows commands  If patient is disoriented or from a Skill Nursing Facility has family been notified of admission?   []Yes   [x]No  Patient belongings?   []Cell phone  [x]Wallet   []Dentures  [x]Clothing  Any specific patient or family belongings/needs/dynamics?   Family went home, will be back later  Miscellaneous comments/pending orders?  NPO, plan for endoscopy today, pt has had 3 days of dark diarrhea, then this am she started to have dark emesis, then became weak, sat on floor and couldn't get up.  Pt denies pain except in her back she has a known T12 fracture, hx of recent  UTI, cannot have back surgery until uti is cleared up, was taking OTC excedrin for back pain. Pt very talkative, purewick placed.      If there are any additional questions please reach out to the Emergency Department.

## 2024-07-09 NOTE — CONSULTS
Gastroenterology Consult Note        Patient: Steffanie Chavez  : 1940  Acct#:      Date:  2024      1. UGIB (upper gastrointestinal bleed)        Subjective:       History of Present Illness  Patient is a 84 y.o.  female admitted with who is seen in consult for GI bleed.  History of CAD, CHF, CLL, hypertension, hyperlipidemia, fatty liver.  S/p cholecystectomy.  She was seen in the ER on  for back pain.  She was diagnosed with UTI and started on Cipro.  Had some diarrhea then.  Thinks it was dark.  Was taking Excedrin and Aleve for back pain for 3 days but stopped taking this about 2 weeks ago.  2 or 3 days ago she started having black/dark diarrhea again.  No abdominal pain.  Overnight she had weakness.  Had to lower herself to the floor.  Was incontinent of diarrhea and vomited multiple times.  Emesis was very dark.  Came to the ER.  No sick contacts.      Past Medical History:   Diagnosis Date    Allergic rhinitis     Anesthesia slow to wake    Arthropathy of cervical facet joint     CAD (coronary artery disease) 2012    Chronic diastolic CHF (congestive heart failure) (HCC)     CLL (chronic lymphocytic leukemia) ([facility]) 2019    DDD (degenerative disc disease), cervical     Diverticulosis 10/21/2010    Fatty liver     Foraminal stenosis of cervical region     GERD (gastroesophageal reflux disease)     Hepatomegaly     Hyperlipidemia     Hypertension     Impaired fasting glucose     Kidney stones     MI (myocardial infarction) ([facility]) 2012    Movement disorder     Obesity     Osteopenia     Osteoporosis     Right carpal tunnel syndrome 2017    Ulnar neuropathy at elbow of right upper extremity 2017    Urge incontinence       Past Surgical History:   Procedure Laterality Date    APPENDECTOMY      CHOLECYSTECTOMY  1966    COLONOSCOPY  10/21/2010    COLONOSCOPY  2014    Dr. Blevins    COLONOSCOPY N/A 2019    COLONOSCOPY WITH BIOPSY performed by      Recent Labs     07/09/24  0708   AST 19   ALT 21   BILITOT 0.7   ALKPHOS 101     Recent Labs     07/09/24  0708   LIPASE 18.0     Recent Labs     07/09/24  0708   PROTIME 14.9   INR 1.14     Invalid input(s): \"PTT\"  No results for input(s): \"OCCULTBLD\" in the last 72 hours.    Imaging Studies:               CT-scan of abdomen and pelvis wo contrast 7/9/24:             IMPRESSION:  No acute abdominopelvic pathology.     No change compared to the prior study.    Assessment/Plan:     Nausea, hematemesis, melena -patient reports dark/black diarrhea for 2 to 3 days.  Had dark vomit overnight.  Was on excedrin and Aleve recently. Hgb 14.5.  BUN to creatinine ratio was elevated which could be from dehydration versus upper GI bleed.  -PPI  -N.p.o.  -Monitor hemoglobin  -EGD today  - avoid nonessential nsaids    Diarrhea -unclear if due to GI bleed versus true diarrhea.  Has been on Cipro so at risk for C. difficile.  -Check stool for C.diff, GI bacterial pathogens PCR, and EIA for parasites      Discussed with Dr. Wili Duggan, PA-  [facility]    [facility]  I have personally performed a face to face diagnostic evaluation on this patient.  I have interviewed and examined the patient and I agree with the findings and recommended plan of care.  In summary, my findings and plan are the following: As above elderly woman with multiple medical problems including coronary artery disease and congestive heart failure who has been dealing with chronic back pain due to T12 compression fracture taking NSAIDs periodically.  Over the past 3 days she developed black diarrhea and this morning vomited black emesis and as such presented to the ED.  She really denies abdominal pain, chest pain or other complaints.  She has been treated for urinary tract infection recently.  On exam, she is pale, tachycardic and her abdomen is obese but soft there is mild tenderness that is not reproducible.  Her clinical picture

## 2024-07-10 PROBLEM — A04.72 C. DIFFICILE COLITIS: Status: ACTIVE | Noted: 2024-07-10

## 2024-07-10 LAB
ANION GAP SERPL CALCULATED.3IONS-SCNC: 8 MMOL/L (ref 3–16)
APTT BLD: 27.7 SEC (ref 22.1–36.4)
BUN SERPL-MCNC: 17 MG/DL (ref 7–20)
CALCIUM SERPL-MCNC: 7.6 MG/DL (ref 8.3–10.6)
CHLORIDE SERPL-SCNC: 114 MMOL/L (ref 99–110)
CO2 SERPL-SCNC: 21 MMOL/L (ref 21–32)
CREAT SERPL-MCNC: <0.5 MG/DL (ref 0.6–1.2)
GFR SERPLBLD CREATININE-BSD FMLA CKD-EPI: >90 ML/MIN/{1.73_M2}
GLUCOSE SERPL-MCNC: 98 MG/DL (ref 70–99)
HCT VFR BLD AUTO: 33.9 % (ref 36–48)
HCT VFR BLD AUTO: 34.4 % (ref 36–48)
HCT VFR BLD AUTO: 35.7 % (ref 36–48)
HGB BLD-MCNC: 11.4 G/DL (ref 12–16)
HGB BLD-MCNC: 11.5 G/DL (ref 12–16)
HGB BLD-MCNC: 11.8 G/DL (ref 12–16)
INR PPP: 1.3 (ref 0.85–1.15)
LACTATE BLDV-SCNC: 1.2 MMOL/L (ref 0.4–2)
POTASSIUM SERPL-SCNC: 4 MMOL/L (ref 3.5–5.1)
PROTHROMBIN TIME: 16.3 SEC (ref 11.9–14.9)
SODIUM SERPL-SCNC: 143 MMOL/L (ref 136–145)

## 2024-07-10 PROCEDURE — 97530 THERAPEUTIC ACTIVITIES: CPT

## 2024-07-10 PROCEDURE — 83605 ASSAY OF LACTIC ACID: CPT

## 2024-07-10 PROCEDURE — 80048 BASIC METABOLIC PNL TOTAL CA: CPT

## 2024-07-10 PROCEDURE — 6360000002 HC RX W HCPCS: Performed by: EMERGENCY MEDICINE

## 2024-07-10 PROCEDURE — 97535 SELF CARE MNGMENT TRAINING: CPT

## 2024-07-10 PROCEDURE — 2580000003 HC RX 258: Performed by: EMERGENCY MEDICINE

## 2024-07-10 PROCEDURE — 83540 ASSAY OF IRON: CPT

## 2024-07-10 PROCEDURE — 2580000003 HC RX 258: Performed by: INTERNAL MEDICINE

## 2024-07-10 PROCEDURE — 6360000002 HC RX W HCPCS: Performed by: PHYSICIAN ASSISTANT

## 2024-07-10 PROCEDURE — 6370000000 HC RX 637 (ALT 250 FOR IP): Performed by: INTERNAL MEDICINE

## 2024-07-10 PROCEDURE — 85018 HEMOGLOBIN: CPT

## 2024-07-10 PROCEDURE — 83550 IRON BINDING TEST: CPT

## 2024-07-10 PROCEDURE — 97116 GAIT TRAINING THERAPY: CPT

## 2024-07-10 PROCEDURE — 97165 OT EVAL LOW COMPLEX 30 MIN: CPT

## 2024-07-10 PROCEDURE — 6370000000 HC RX 637 (ALT 250 FOR IP): Performed by: NURSE PRACTITIONER

## 2024-07-10 PROCEDURE — 85610 PROTHROMBIN TIME: CPT

## 2024-07-10 PROCEDURE — 36415 COLL VENOUS BLD VENIPUNCTURE: CPT

## 2024-07-10 PROCEDURE — 97162 PT EVAL MOD COMPLEX 30 MIN: CPT

## 2024-07-10 PROCEDURE — 85014 HEMATOCRIT: CPT

## 2024-07-10 PROCEDURE — 85730 THROMBOPLASTIN TIME PARTIAL: CPT

## 2024-07-10 PROCEDURE — 85025 COMPLETE CBC W/AUTO DIFF WBC: CPT

## 2024-07-10 PROCEDURE — 1200000000 HC SEMI PRIVATE

## 2024-07-10 RX ORDER — VANCOMYCIN HYDROCHLORIDE 125 MG/1
250 CAPSULE ORAL EVERY 6 HOURS SCHEDULED
Status: DISCONTINUED | OUTPATIENT
Start: 2024-07-10 | End: 2024-07-11 | Stop reason: HOSPADM

## 2024-07-10 RX ORDER — PANTOPRAZOLE SODIUM 40 MG/10ML
40 INJECTION, POWDER, LYOPHILIZED, FOR SOLUTION INTRAVENOUS 2 TIMES DAILY
Status: DISCONTINUED | OUTPATIENT
Start: 2024-07-10 | End: 2024-07-11 | Stop reason: HOSPADM

## 2024-07-10 RX ADMIN — SODIUM CHLORIDE, PRESERVATIVE FREE 10 ML: 5 INJECTION INTRAVENOUS at 09:01

## 2024-07-10 RX ADMIN — PANTOPRAZOLE SODIUM 40 MG: 40 INJECTION, POWDER, FOR SOLUTION INTRAVENOUS at 09:40

## 2024-07-10 RX ADMIN — CETIRIZINE HYDROCHLORIDE 10 MG: 10 TABLET, FILM COATED ORAL at 09:01

## 2024-07-10 RX ADMIN — SODIUM CHLORIDE: 9 INJECTION, SOLUTION INTRAVENOUS at 16:54

## 2024-07-10 RX ADMIN — VANCOMYCIN HYDROCHLORIDE 250 MG: 125 CAPSULE ORAL at 12:29

## 2024-07-10 RX ADMIN — AMLODIPINE BESYLATE 5 MG: 5 TABLET ORAL at 09:01

## 2024-07-10 RX ADMIN — SODIUM CHLORIDE: 9 INJECTION, SOLUTION INTRAVENOUS at 09:02

## 2024-07-10 RX ADMIN — SODIUM CHLORIDE: 9 INJECTION, SOLUTION INTRAVENOUS at 00:21

## 2024-07-10 RX ADMIN — TRAMADOL HYDROCHLORIDE 50 MG: 50 TABLET ORAL at 12:37

## 2024-07-10 RX ADMIN — NITROFURANTOIN MONOHYDRATE/MACROCRYSTALS 100 MG: 25; 75 CAPSULE ORAL at 09:01

## 2024-07-10 RX ADMIN — VANCOMYCIN HYDROCHLORIDE 250 MG: 125 CAPSULE ORAL at 06:00

## 2024-07-10 RX ADMIN — VANCOMYCIN HYDROCHLORIDE 250 MG: 125 CAPSULE ORAL at 00:18

## 2024-07-10 RX ADMIN — PANTOPRAZOLE SODIUM 8 MG/HR: 40 INJECTION, POWDER, FOR SOLUTION INTRAVENOUS at 06:04

## 2024-07-10 RX ADMIN — TRAMADOL HYDROCHLORIDE 50 MG: 50 TABLET ORAL at 23:33

## 2024-07-10 RX ADMIN — VANCOMYCIN HYDROCHLORIDE 250 MG: 125 CAPSULE ORAL at 23:33

## 2024-07-10 RX ADMIN — PANTOPRAZOLE SODIUM 40 MG: 40 INJECTION, POWDER, FOR SOLUTION INTRAVENOUS at 20:45

## 2024-07-10 RX ADMIN — NITROFURANTOIN MONOHYDRATE/MACROCRYSTALS 100 MG: 25; 75 CAPSULE ORAL at 20:44

## 2024-07-10 RX ADMIN — SODIUM CHLORIDE, PRESERVATIVE FREE 10 ML: 5 INJECTION INTRAVENOUS at 20:45

## 2024-07-10 RX ADMIN — THERA TABS 1 TABLET: TAB at 09:01

## 2024-07-10 RX ADMIN — VANCOMYCIN HYDROCHLORIDE 250 MG: 125 CAPSULE ORAL at 17:35

## 2024-07-10 ASSESSMENT — PAIN SCALES - GENERAL
PAINLEVEL_OUTOF10: 6
PAINLEVEL_OUTOF10: 6
PAINLEVEL_OUTOF10: 2

## 2024-07-10 NOTE — PROGRESS NOTES
baseline function. Patient limited by SOB and low back pain. Reports decline in function following T12 compression fracture. SBA-CGA for all functional mobility this date. Fatigues quickly.    Safety Interventions: patient left in chair, chair alarm in place, call light within reach, patient at risk for falls, and nurse notified    Plan  Frequency: 3-5 x/per week  Current Treatment Recommendations: strengthening, balance training, functional mobility training, transfer training, gait training, stair training, endurance training, home exercise program, and equipment evaluation/education    Goals  Patient Goals: did not state   Short Term Goals:  Time Frame: discharge  Patient will complete bed mobility at supervision   Patient will complete transfers at supervision   Patient will ambulate 50 ft with use of LRAD at supervision  Patient will ascend/descend 4 stairs with (B) handrail at supervision    Above goals reviewed on 7/10/2024.  All goals are ongoing at this time unless indicated above.      Therapy Session Time      Individual Group Co-treatment   Time In     1049   Time Out     1217   Minutes     88     Timed Code Treatment Minutes:  73 Minutes  Total Treatment Minutes:  88       Electronically Signed By: Ai Crespo, PT  Thanks, Ai Crespo, PT, DPT [id number]

## 2024-07-10 NOTE — PLAN OF CARE
Problem: Pain  Goal: Verbalizes/displays adequate comfort level or baseline comfort level  7/9/2024 2104 by Joanne Cazares RN  Outcome: Progressing     Problem: Safety - Adult  Goal: Free from fall injury  7/9/2024 2104 by Joanne Cazares RN  Outcome: Progressing     Problem: Discharge Planning  Goal: Discharge to home or other facility with appropriate resources  7/9/2024 2104 by Joanne Cazares RN  Outcome: Progressing     Problem: Skin/Tissue Integrity  Goal: Absence of new skin breakdown  Description: 1.  Monitor for areas of redness and/or skin breakdown  2.  Assess vascular access sites hourly  3.  Every 4-6 hours minimum:  Change oxygen saturation probe site  4.  Every 4-6 hours:  If on nasal continuous positive airway pressure, respiratory therapy assess nares and determine need for appliance change or resting period.  7/9/2024 2104 by Joanne Cazares RN  Outcome: Progressing     Problem: ABCDS Injury Assessment  Goal: Absence of physical injury  7/9/2024 2104 by Joanne Cazares RN  Outcome: Progressing

## 2024-07-10 NOTE — PROGRESS NOTES
Gastroenterology Progress Note      Steffanie Chavez is a 84 y.o. female patient.  1. UGIB (upper gastrointestinal bleed)    2. Hematemesis, unspecified whether nausea present        SUBJECTIVE:  Had 4 black liquid medium BMs after EGD. No further N/V.         Physical    VITALS:  /73   Pulse 73   Temp 98 °F (36.7 °C) (Oral)   Resp 16   Ht 1.575 m (5' 2\")   Wt 83 kg (183 lb)   SpO2 97%   BMI 33.47 kg/m²   TEMPERATURE:  Current - Temp: 98 °F (36.7 °C); Max - Temp  Av.8 °F (36.6 °C)  Min: 97 °F (36.1 °C)  Max: 98.4 °F (36.9 °C)    NAD  RRR, Nl s1s2  Lungs CTA Bilaterally, normal effort  Abdomen soft, ND, mild epigastric tenderness, no HSM, Bowel sounds normal    Data    Data Review:    Recent Labs     24  0708 24  1135 24  1521 24  2214 07/10/24  0439   WBC 18.9*  --   --   --  14.1*   HGB 14.5   < > 12.1 11.3* 11.7*   HCT 42.6   < > 36.8 33.1* 34.2*   MCV 92.2  --   --   --  93.5     --   --   --  220    < > = values in this interval not displayed.     Recent Labs     24  0708 07/10/24  0439    143   K 4.1 4.0    114*   CO2 20* 21   BUN 50* 17   CREATININE 0.7 <0.5*     Recent Labs     24  07   AST 19   ALT 21   BILITOT 0.7   ALKPHOS 101     Recent Labs     24  0708   LIPASE 18.0     Recent Labs     24  0708 07/10/24  043   PROTIME 14.9 16.3*   INR 1.14 1.30*     Invalid input(s): \"PTT\"    Duodenum Bulb - 10 mm cratered ulcer with clean base, 5 mm ulcer with flat  black spot - cauterized with 7 Fr bipolar probe (Gold probe)  with good coaptation.  5 mm ulcer with flat red spot in superior duodenal angle  (post bulb) that bled after suctioning. Cauterized with 7 Fr  bipolar probe x 4-10 sec pulses with good hemostasis and  good coaptation achieved.  Fourth small 3 mm clean based ulcer  2nd Portion - Normal  IMPRESSION : Multiple duodenal bulb and post bulbar ulcers - one with  active bleeding cauterized with bipolar  cautery probe  successfully  Normal stomach - biopsied for H.pylori.  PLAN : Full liquid diet  Protonix 40 mg IV BID  Follow up as inpatient.  Await biopsies       ASSESSMENT / PLAN      Nausea, hematemesis, melena, and duodenal ulcers with bleed-patient reported dark/black diarrhea for 2 to 3 days prior to admission.  Then had coffee-ground emesis..  Was on excedrin and Aleve recently.  EGD 7/9/2024 with multiple duodenal ulcers, one with active bleeding treated with cautery.  Biopsies pending for H. pylori.  Had several black stools but BUN decreased and hemoglobin stable currently.  Suspect black stools are old blood.   -Protonix 40 mg IV twice daily  -Full liquid diet  -Follow-up pathology  - avoid nonessential nsaids    Acute blood loss anemia  -due to above.  Hemoglobin was 14 at admission and down to 11 on repeat.  Hemoglobin stable at 11 today.  -Monitor hemoglobin     C. difficile.-Patient had diarrhea at home in setting of GI bleed.  Had a leukocytosis of 18,000 and has been on antibiotics for UTI so C. difficile was checked and was positive.    -started on Vancomycin 250 mg p.o. 4 times daily      Discussed with NELL RiosC  [facility]    [facility] STAFF  I have personally performed a face to face diagnostic evaluation on this patient.  I have interviewed and examined the patient and I agree with the findings and recommended plan of care.  In summary, my findings and plan are the following: As above, she continues to have black diarrheal stools however her hemoglobin has stabilized around 11 and her BUN along with her white blood cell count have markedly diminished.  She was found to be C. difficile toxin positive which likely explains her diarrhea.  On exam her abdomen is soft and nontender and nondistended.  Vancomycin 250 mg 4 times daily has been ordered and should continue for a 14-day course.  Agree with daily CBC and continue her IV Protonix.  Would change her to a

## 2024-07-10 NOTE — PROGRESS NOTES
Peritoneum/Retroperitoneum: No pneumoperitoneum.  Calcific atherosclerotic disease aorta.  No aneurysm.  Unremarkable appearance of the IVC. No adenopathy or fluid. Bones/Soft Tissues: No acute superficial soft tissue or osseous structure abnormality evident.  Grade 1 degenerative anterolisthesis L4 on L5, no spondylolysis.     1.  No CT evidence of an acute intra-abdominal or intrapelvic process. 2. Mild-to-moderate calcific atherosclerosis coronary arteries. 3. Cholecystectomy, appendectomy and hysterectomy.       Lab Results   Component Value Date/Time    GLUCOSE 98 07/10/2024 04:39 AM     No results found for: \"POCGLU\"  /73   Pulse 73   Temp 98 °F (36.7 °C) (Oral)   Resp 16   Ht 1.575 m (5' 2\")   Wt 83 kg (183 lb)   SpO2 97%   BMI 33.47 kg/m²     Assessment and Plan:  Principal Problem:    GI bleed -Established problem. Stable.  S/p EGD  Plan: await procedure note from GI (EGD apparently done 7/9). Cont on PPI  Active Problems:    Hyperlipidemia -Established problem. Stable.    Plan: cont on meds    Benign essential HTN -Established problem. Stable.  127/73  Plan: Continue medications. Continue to check BP q shift. CBC and BMP ordered to monitor for disease progression, medication side effect.     Chronic diastolic CHF (congestive heart failure) (HCC) -Established problem. Stable.    Plan: Continue present orders/plan.     Lactic acidosis -Established problem. Improving.  Lactate 1.2  Plan: cont fluids    C. difficile colitis  Plan: on po vanc. GI to see today      Case discussed with: rn  Tests ordered/reviewed: cbc, bmp, c diff          (Please note that portions of this note were completed with a voice recognition program.  Efforts were made to edit the dictations but occasionally words are mis-transcribed.)        Brandon Robertson MD  7/10/2024    Please use [employer] to contact me with any questions during the day.   The hospitalist service will provide cross-coverage for this patient from  7pm to 7am.    During those hours, contact the on-call hospitalist MD/AYE for questions.

## 2024-07-10 NOTE — CARE COORDINATION
Discharge Planning Note:    Chart reviewed and it appears that patient has minimal needs for discharge at this time. Risk Score 12 %     Primary Care Physician is Kev Villafana DO   Primary insurance is Medicare    PT/OT evals pending    Please notify case management if any discharge needs are identified.      Case management will continue to follow progress and update discharge plan as needed.

## 2024-07-10 NOTE — PROGRESS NOTES
[facility] - Inpatient Rehabilitation Department   Phone: [phone]    Occupational Therapy    [x] Initial Evaluation            [] Daily Treatment Note         [] Discharge Summary      Patient: Steffanie Chavez   : 1940   MRN: [id number]   Date of Service:  7/10/2024    Admitting Diagnosis: GI bleed  Current Admission Summary: Steffanie Chavez is a 84 y.o. female  who presents to the ED complaining of onset early this morning around 2 AM of dark vomiting with dark-colored \"chocolate syrup\" diarrhea.  She has never had symptoms like this before.  She does have some generalized abdominal cramping with it.  She denies any chest pain or shortness of breath.  She arrives by EMS.  She says that she is not anticoagulated and does not drink alcohol.  She is currently being treated with an antibioti   Past Medical History:  has a past medical history of Allergic rhinitis, Anesthesia, Arthropathy of cervical facet joint, CAD (coronary artery disease), Chronic diastolic CHF (congestive heart failure) ([facility]), CLL (chronic lymphocytic leukemia) (HCC), DDD (degenerative disc disease), cervical, Diverticulosis, Fatty liver, Foraminal stenosis of cervical region, GERD (gastroesophageal reflux disease), Hepatomegaly, Hyperlipidemia, Hypertension, Impaired fasting glucose, Kidney stones, MI (myocardial infarction) ([facility]), Movement disorder, Obesity, Osteopenia, Osteoporosis, Right carpal tunnel syndrome, Ulnar neuropathy at elbow of right upper extremity, and Urge incontinence.  Past Surgical History:  has a past surgical history that includes Cholecystectomy (); Ovary removal (1983); Colonoscopy (10/21/2010); Upper gastrointestinal endoscopy (2011); Wrist fracture surgery (2011); Hysterectomy (); eye surgery; Tonsillectomy; Appendectomy; fracture surgery (10/2011); Percutaneous Transluminal Coronary Angio (2012); Colonoscopy (2014); Upper gastrointestinal endoscopy (2015);

## 2024-07-10 NOTE — PROGRESS NOTES
Nursing contacted Cross Cover NP with results of stool sample from 22:16 on 07/09/2024 -     Positive for C. Difficile. Pharmacist contacted and Pt. Will be started on Vancomycin 250 mg PO q     6 hours x 2 weeks. Pharmacy states that although Pt. Has allergy to Clarithromycin, also a     \"-mycin\" Pt. Should do fine as the Vanco does not leave the GI tract during dosing so potential     Allergy risk minimal.        Keyonna Collazo-Marker, APRN - CNP

## 2024-07-11 VITALS
DIASTOLIC BLOOD PRESSURE: 57 MMHG | HEIGHT: 62 IN | OXYGEN SATURATION: 95 % | BODY MASS INDEX: 33.92 KG/M2 | RESPIRATION RATE: 18 BRPM | TEMPERATURE: 98.2 F | SYSTOLIC BLOOD PRESSURE: 103 MMHG | WEIGHT: 184.3 LBS | HEART RATE: 83 BPM

## 2024-07-11 DIAGNOSIS — Z01.818 PREOPERATIVE EXAMINATION: ICD-10-CM

## 2024-07-11 DIAGNOSIS — Z01.818 PREOP TESTING: ICD-10-CM

## 2024-07-11 DIAGNOSIS — Z01.818 PRE-OPERATIVE CLEARANCE: Primary | ICD-10-CM

## 2024-07-11 LAB
ANION GAP SERPL CALCULATED.3IONS-SCNC: 8 MMOL/L (ref 3–16)
BASOPHILS # BLD: 0 K/UL (ref 0–0.2)
BASOPHILS # BLD: 0.1 K/UL (ref 0–0.2)
BASOPHILS NFR BLD: 0 %
BASOPHILS NFR BLD: 0.5 %
BUN SERPL-MCNC: 8 MG/DL (ref 7–20)
CALCIUM SERPL-MCNC: 7.7 MG/DL (ref 8.3–10.6)
CHLORIDE SERPL-SCNC: 109 MMOL/L (ref 99–110)
CO2 SERPL-SCNC: 23 MMOL/L (ref 21–32)
CREAT SERPL-MCNC: <0.5 MG/DL (ref 0.6–1.2)
DEPRECATED RDW RBC AUTO: 13.2 % (ref 12.4–15.4)
DEPRECATED RDW RBC AUTO: 13.6 % (ref 12.4–15.4)
EOSINOPHIL # BLD: 0.1 K/UL (ref 0–0.6)
EOSINOPHIL # BLD: 0.8 K/UL (ref 0–0.6)
EOSINOPHIL NFR BLD: 1 %
EOSINOPHIL NFR BLD: 6.1 %
GFR SERPLBLD CREATININE-BSD FMLA CKD-EPI: >90 ML/MIN/{1.73_M2}
GI PATHOGENS PNL STL NAA+PROBE: NORMAL
GLUCOSE SERPL-MCNC: 97 MG/DL (ref 70–99)
HCT VFR BLD AUTO: 31.2 % (ref 36–48)
HCT VFR BLD AUTO: 34.2 % (ref 36–48)
HCT VFR BLD AUTO: 37.5 % (ref 36–48)
HGB BLD-MCNC: 10.8 G/DL (ref 12–16)
HGB BLD-MCNC: 11.7 G/DL (ref 12–16)
HGB BLD-MCNC: 12.2 G/DL (ref 12–16)
IRON SATN MFR SERPL: 54 % (ref 15–50)
IRON SERPL-MCNC: 120 UG/DL (ref 37–145)
LYMPHOCYTES # BLD: 4.9 K/UL (ref 1–5.1)
LYMPHOCYTES # BLD: 5.2 K/UL (ref 1–5.1)
LYMPHOCYTES NFR BLD: 36 %
LYMPHOCYTES NFR BLD: 39.3 %
MCH RBC QN AUTO: 32 PG (ref 26–34)
MCH RBC QN AUTO: 32.2 PG (ref 26–34)
MCHC RBC AUTO-ENTMCNC: 34.2 G/DL (ref 31–36)
MCHC RBC AUTO-ENTMCNC: 34.7 G/DL (ref 31–36)
MCV RBC AUTO: 92.7 FL (ref 80–100)
MCV RBC AUTO: 93.5 FL (ref 80–100)
MONOCYTES # BLD: 0.3 K/UL (ref 0–1.3)
MONOCYTES # BLD: 0.7 K/UL (ref 0–1.3)
MONOCYTES NFR BLD: 2 %
MONOCYTES NFR BLD: 5.6 %
NEUTROPHILS # BLD: 6.1 K/UL (ref 1.7–7.7)
NEUTROPHILS # BLD: 8.5 K/UL (ref 1.7–7.7)
NEUTROPHILS NFR BLD: 48.5 %
NEUTROPHILS NFR BLD: 60 %
PATH INTERP BLD-IMP: NO
PLATELET # BLD AUTO: 196 K/UL (ref 135–450)
PLATELET # BLD AUTO: 220 K/UL (ref 135–450)
PLATELET BLD QL SMEAR: ADEQUATE
PMV BLD AUTO: 7 FL (ref 5–10.5)
PMV BLD AUTO: 7.2 FL (ref 5–10.5)
POTASSIUM SERPL-SCNC: 3.4 MMOL/L (ref 3.5–5.1)
RBC # BLD AUTO: 3.36 M/UL (ref 4–5.2)
RBC # BLD AUTO: 3.66 M/UL (ref 4–5.2)
RBC MORPH BLD: NORMAL
SLIDE REVIEW: ABNORMAL
SODIUM SERPL-SCNC: 140 MMOL/L (ref 136–145)
TIBC SERPL-MCNC: 221 UG/DL (ref 260–445)
VARIANT LYMPHS NFR BLD MANUAL: 1 % (ref 0–6)
WBC # BLD AUTO: 12.5 K/UL (ref 4–11)
WBC # BLD AUTO: 14.1 K/UL (ref 4–11)

## 2024-07-11 PROCEDURE — 85014 HEMATOCRIT: CPT

## 2024-07-11 PROCEDURE — 6370000000 HC RX 637 (ALT 250 FOR IP): Performed by: NURSE PRACTITIONER

## 2024-07-11 PROCEDURE — 85025 COMPLETE CBC W/AUTO DIFF WBC: CPT

## 2024-07-11 PROCEDURE — 6370000000 HC RX 637 (ALT 250 FOR IP): Performed by: INTERNAL MEDICINE

## 2024-07-11 PROCEDURE — 6360000002 HC RX W HCPCS: Performed by: PHYSICIAN ASSISTANT

## 2024-07-11 PROCEDURE — 80048 BASIC METABOLIC PNL TOTAL CA: CPT

## 2024-07-11 PROCEDURE — 36415 COLL VENOUS BLD VENIPUNCTURE: CPT

## 2024-07-11 PROCEDURE — 85018 HEMOGLOBIN: CPT

## 2024-07-11 PROCEDURE — 2580000003 HC RX 258: Performed by: INTERNAL MEDICINE

## 2024-07-11 RX ORDER — POTASSIUM CHLORIDE 20 MEQ/1
40 TABLET, EXTENDED RELEASE ORAL 2 TIMES DAILY WITH MEALS
Status: DISCONTINUED | OUTPATIENT
Start: 2024-07-11 | End: 2024-07-11 | Stop reason: HOSPADM

## 2024-07-11 RX ORDER — VANCOMYCIN HYDROCHLORIDE 250 MG/1
250 CAPSULE ORAL 4 TIMES DAILY
Qty: 48 CAPSULE | Refills: 0 | Status: SHIPPED | OUTPATIENT
Start: 2024-07-11 | End: 2024-07-23

## 2024-07-11 RX ORDER — PANTOPRAZOLE SODIUM 40 MG/1
40 TABLET, DELAYED RELEASE ORAL
Qty: 30 TABLET | Refills: 1 | Status: SHIPPED | OUTPATIENT
Start: 2024-07-11

## 2024-07-11 RX ADMIN — VANCOMYCIN HYDROCHLORIDE 250 MG: 125 CAPSULE ORAL at 06:04

## 2024-07-11 RX ADMIN — CETIRIZINE HYDROCHLORIDE 10 MG: 10 TABLET, FILM COATED ORAL at 10:35

## 2024-07-11 RX ADMIN — THERA TABS 1 TABLET: TAB at 10:35

## 2024-07-11 RX ADMIN — SODIUM CHLORIDE: 9 INJECTION, SOLUTION INTRAVENOUS at 00:45

## 2024-07-11 RX ADMIN — POTASSIUM CHLORIDE 40 MEQ: 1500 TABLET, EXTENDED RELEASE ORAL at 10:35

## 2024-07-11 RX ADMIN — VANCOMYCIN HYDROCHLORIDE 250 MG: 125 CAPSULE ORAL at 12:25

## 2024-07-11 RX ADMIN — SODIUM CHLORIDE, PRESERVATIVE FREE 10 ML: 5 INJECTION INTRAVENOUS at 10:35

## 2024-07-11 RX ADMIN — PANTOPRAZOLE SODIUM 40 MG: 40 INJECTION, POWDER, FOR SOLUTION INTRAVENOUS at 10:35

## 2024-07-11 RX ADMIN — AMLODIPINE BESYLATE 5 MG: 5 TABLET ORAL at 10:35

## 2024-07-11 RX ADMIN — NITROFURANTOIN MONOHYDRATE/MACROCRYSTALS 100 MG: 25; 75 CAPSULE ORAL at 10:35

## 2024-07-11 ASSESSMENT — PAIN SCALES - GENERAL: PAINLEVEL_OUTOF10: 2

## 2024-07-11 NOTE — PROGRESS NOTES
Gastroenterology Progress Note      Steffanie Chavez is a 84 y.o. female patient.  1. UGIB (upper gastrointestinal bleed)    2. Hematemesis, unspecified whether nausea present        SUBJECTIVE:  tolerating diet.  Had a green stool today.        Physical    VITALS:  BP (!) 103/57   Pulse 83   Temp 98.2 °F (36.8 °C) (Oral)   Resp 18   Ht 1.575 m (5' 2\")   Wt 83.6 kg (184 lb 4.9 oz)   SpO2 95%   BMI 33.71 kg/m²   TEMPERATURE:  Current - Temp: 98.2 °F (36.8 °C); Max - Temp  Av.1 °F (36.7 °C)  Min: 97.5 °F (36.4 °C)  Max: 98.6 °F (37 °C)    NAD  RRR  Lungs CTA Bilaterally, normal effort  Abdomen soft, ND, nontender no HSM, Bowel sounds normal    Data    Data Review:    Recent Labs     24  0708 24  1135 07/10/24  0439 07/10/24  1004 07/10/24  2144 24  0434 24  1001   WBC 18.9*  --  14.1*  --   --  12.5*  --    HGB 14.5   < > 11.7*   < > 11.5* 10.8* 12.2   HCT 42.6   < > 34.2*   < > 33.9* 31.2* 37.5   MCV 92.2  --  93.5  --   --  92.7  --      --  220  --   --  196  --     < > = values in this interval not displayed.       Recent Labs     24  0708 07/10/24  04324  043    143 140   K 4.1 4.0 3.4*    114* 109   CO2 20* 21 23   BUN 50* 17 8   CREATININE 0.7 <0.5* <0.5*       Recent Labs     24  07   AST 19   ALT 21   BILITOT 0.7   ALKPHOS 101       Recent Labs     24  0708   LIPASE 18.0       Recent Labs     24  0708 07/10/24  043   PROTIME 14.9 16.3*   INR 1.14 1.30*       Invalid input(s): \"PTT\"    Duodenum Bulb - 10 mm cratered ulcer with clean base, 5 mm ulcer with flat  black spot - cauterized with 7 Fr bipolar probe (Gold probe)  with good coaptation.  5 mm ulcer with flat red spot in superior duodenal angle  (post bulb) that bled after suctioning. Cauterized with 7 Fr  bipolar probe x 4-10 sec pulses with good hemostasis and  good coaptation achieved.  Fourth small 3 mm clean based ulcer  2nd Portion -  Normal  IMPRESSION : Multiple duodenal bulb and post bulbar ulcers - one with  active bleeding cauterized with bipolar cautery probe  successfully  Normal stomach - biopsied for H.pylori.  PLAN : Full liquid diet  Protonix 40 mg IV BID  Follow up as inpatient.  Await biopsies       ASSESSMENT / PLAN      Nausea, hematemesis, melena, and duodenal ulcers with bleed-patient reported dark/black diarrhea for 2 to 3 days prior to admission.  Then had coffee-ground emesis..  Was on excedrin and Aleve recently.  EGD 7/9/2024 with multiple duodenal ulcers, one with active bleeding treated with cautery.  Biopsies negative for H. pylori.  No further bleeding.  Passing green stool.  -Protonix 40 mg twice daily  - avoid nonessential nsaids    Acute blood loss anemia  -due to above.  Hemoglobin was 14 at admission and down to 11 on repeat.  Hemoglobin stable at 12 today.  -Monitor hemoglobin     C. difficile.-Patient had diarrhea at home in setting of GI bleed.  Had a leukocytosis of 18,000 and has been on antibiotics for UTI so C. difficile was checked and was positive.    - Vancomycin 250 mg p.o. 4 times daily x 14 days     Discussed with SARAHY Rios-C  eBuddy

## 2024-07-11 NOTE — PLAN OF CARE
Problem: Chronic Conditions and Co-morbidities  Goal: Patient's chronic conditions and co-morbidity symptoms are monitored and maintained or improved  7/10/2024 2343 by Joanne Cazares RN  Outcome: Progressing     Problem: Pain  Goal: Verbalizes/displays adequate comfort level or baseline comfort level  7/10/2024 2343 by Joanne Cazares RN  Outcome: Progressing     Problem: Safety - Adult  Goal: Free from fall injury  7/10/2024 2343 by Joanne Cazares RN  Outcome: Progressing     Problem: Discharge Planning  Goal: Discharge to home or other facility with appropriate resources  7/10/2024 2343 by Joanne Cazares RN  Outcome: Progressing     Problem: Skin/Tissue Integrity  Goal: Absence of new skin breakdown  Description: 1.  Monitor for areas of redness and/or skin breakdown  2.  Assess vascular access sites hourly  3.  Every 4-6 hours minimum:  Change oxygen saturation probe site  4.  Every 4-6 hours:  If on nasal continuous positive airway pressure, respiratory therapy assess nares and determine need for appliance change or resting period.  7/10/2024 2343 by Joanne Cazares RN  Outcome: Progressing

## 2024-07-11 NOTE — PROGRESS NOTES
Progress Note - Dr. Robertson - Internal Medicine  PCP: Kev Villafana DO [id number] [facility] [id number] [phone]    Hospital Day: 2  Code Status: Full Code  Current Diet: ADULT DIET; Regular        CC: follow up on medical issues    Subjective:   Steffanie Chavez is a 84 y.o. female.    Pt seen and examined  Chart reviewed since last visit, labs and imaging below        Pt also c diff pos  Now on po vanc    Pt  supposed to have kypho soon as outpt    Review of Systems: (1 system for EPF, 2-9 for detailed, 10+ for comprehensive)  Constitutional: Negative for chills and fever.     HENT: Negative for dental problem, nosebleeds and rhinorrhea.      Eyes: Negative for photophobia and visual disturbance.     Respiratory: Negative for cough, chest tightness and shortness of breath.      Cardiovascular: Negative for chest pain and leg swelling.     Gastrointestinal: Negative for diarrhea, positive for nausea and vomiting.   Positive for melena  Endocrine: Negative for polydipsia and polyphagia.     Genitourinary: Negative for frequency, hematuria and urgency.     Musculoskeletal: Negative for back pain and myalgias.     Skin: Negative for rash.     Allergic/Immunologic: Negative for food allergies.     Neurological: Negative for dizziness, seizures, syncope and facial asymmetry.     Hematological: Negative for adenopathy.     Psychiatric/Behavioral: Negative for dysphoric mood. The patient is not nervous/anxious.              I have reviewed the patient's medical and social history in detail and updated the computerized patient record.  To recap: She  has a past medical history of Allergic rhinitis, Anesthesia, Arthropathy of cervical facet joint, CAD (coronary artery disease), Chronic diastolic CHF (congestive heart failure) (HCC), CLL (chronic lymphocytic leukemia) (HCC), DDD (degenerative disc disease), cervical, Diverticulosis, Fatty liver, Foraminal stenosis of cervical region, GERD (gastroesophageal reflux  PPI  Active Problems:    Hyperlipidemia -Established problem. Stable.    Plan: cont on meds    Benign essential HTN -Established problem. Stable.  134/78  Plan: Continue medications. Continue to check BP q shift. CBC and BMP ordered to monitor for disease progression, medication side effect.     Chronic diastolic CHF (congestive heart failure) (HCC) -Established problem. Stable.    Plan: Continue present orders/plan.     Lactic acidosis -Established problem. Improving.  Lactate normalied  Plan: cont fluids    C. difficile colitis  Plan: on po vanc. GI on board      Case discussed with GI  Tests ordered/reviewed: cbc, bmp, c diff          (Please note that portions of this note were completed with a voice recognition program.  Efforts were made to edit the dictations but occasionally words are mis-transcribed.)        Brandon Robertson MD  7/11/2024    Please use [employer] to contact me with any questions during the day.   The hospitalist service will provide cross-coverage for this patient from 7pm to 7am.    During those hours, contact the on-call hospitalist MD/AYE for questions.

## 2024-07-11 NOTE — CARE COORDINATION
Case Management Assessment  Initial Evaluation    Date/Time of Evaluation: 7/11/2024 12:01 PM  Assessment Completed by: EDIN OWEN    If patient is discharged prior to next notation, then this note serves as note for discharge by case management.    Patient Name: Steffanie Chavez                   YOB: 1940  Diagnosis: GI bleed [K92.2]  UGIB (upper gastrointestinal bleed) [K92.2]                   Date / Time: 7/9/2024  6:37 AM    Patient Admission Status: Inpatient   Readmission Risk (Low < 19, Mod (19-27), High > 27): Readmission Risk Score: 11.2    Current PCP: Kev Villafana, DO  PCP verified by CM? Yes    Chart Reviewed: Yes      History Provided by: Patient  Patient Orientation: Alert and Oriented    Patient Cognition: Alert    Hospitalization in the last 30 days (Readmission):  No    If yes, Readmission Assessment in CM Navigator will be completed.    Advance Directives:      Code Status: Full Code   Patient's Primary Decision Maker is: Legal Next of Kin    Primary Decision Maker: Aleyxs Chavez r - Spouse - [phone]    Secondary Decision Maker: Kosta Chavez - Child - [phone]    Supplemental (Other) Decision Maker: RufusKiki - Child - [phone]    Discharge Planning:    Patient lives with: Spouse/Significant Other Type of Home: House  Primary Care Giver: Self (Patient lives at home with her .)  Patient Support Systems include: Children, Family Members   Current Financial resources: Medicare  Current community resources: None  Current services prior to admission: Durable Medical Equipment            Current DME: Cane, Walker            Type of Home Care services:  None    ADLS  Prior functional level: Independent in ADLs/IADLs  Current functional level: Other (see comment) (PT/OT recommended Home Health Care (HHC))    PT AM-PAC: 18 /24  OT AM-PAC: 18 /24    Family can provide assistance at DC: No  Would you like Case Management to discuss the discharge plan with  any other family members/significant others, and if so, who? No  Plans to Return to Present Housing: Yes  Other Identified Issues/Barriers to RETURNING to current housing: N/A  Potential Assistance needed at discharge: N/A            Potential DME:    Patient expects to discharge to: House  Plan for transportation at discharge: Self    Financial    Payor: MEDICARE / Plan: MEDICARE PART A AND B / Product Type: *No Product type* /     Does insurance require precert for SNF: No    Potential assistance Purchasing Medications: No  Meds-to-Beds request: Yes      [id number] - [place], [place] - [address] - P [phone] - F [phone]  [address]  [facility] [address]  Phone: [phone] Fax: [phone]    [id number] - [place], [place] - [address] [place] [address] [place] [address] - P [phone] - F [phone] [address] [facility] [address]  Phone: [phone] Fax: [phone]      Notes:    Factors facilitating achievement of predicted outcomes: Family support, Cooperative, and Pleasant    Barriers to discharge: Current medical concerns need resolved.     Additional Case Management Notes:  (CM) completed assessment with patient. Patient reported she lives at home with her . CM informed patient that PT/OT recommended Home Health Care (HHC). Patient declined HHC. Patient reoported she has lots of workout equipment at home that she can engage with on her own. CM informed patient if she ever changes her mind on HHC needs she can contact her PCP to place an order. Patient reported she will need transport at discharge.     The Plan for Transition of Care is related to the following treatment goals of GI bleed [K92.2]  UGIB (upper gastrointestinal bleed) [K92.2]    IF APPLICABLE: The Patient and/or patient representative Steffanie and her family were provided with a choice of provider and agrees with the discharge plan. Freedom of choice list with basic

## 2024-07-11 NOTE — FLOWSHEET NOTE
07/11/24 1437   IMM Letter   IMM Letter given to Patient/Family/Significant other/Guardian/POA/by: Given to patient by Social Work/Case Management student Edin.   IMM Letter date given: 07/11/24   IMM Letter time given: 1415         Electronically signed by EDIN OWEN on 7/11/2024 at 2:37 PM

## 2024-07-11 NOTE — DISCHARGE SUMMARY
[facility] -- Physician Discharge Summary     Steffanie Chavez  1940  MRN: [id number]    Admit Date: 7/9/2024  Discharge Date: No discharge date for patient encounter.    Attending MD: Brandon Roebrtson MD  Discharging MD: Brandon Robertson MD  PCP: Kev Villafana DO [address] [facility] [address] [phone]    Admission Diagnosis: GI bleed [K92.2]  UGIB (upper gastrointestinal bleed) [K92.2]  DISCHARGE DIAGNOSIS: same    Full Hospital Problem List:  Active Hospital Problems    Diagnosis Date Noted    C. difficile colitis [A04.72] 07/10/2024    GI bleed [K92.2] 07/09/2024    Lactic acidosis [E87.20] 07/09/2024    Chronic diastolic CHF (congestive heart failure) (HCC) [I50.32] 06/18/2021    Benign essential HTN [I10] 10/11/2013    Hyperlipidemia [E78.5] 06/12/2013           Hospital Course:  84 y.o. female  who presents to the ED complaining of onset early this morning around 2 AM of dark vomiting with dark-colored \"chocolate syrup\" diarrhea.  She has never had symptoms like this before.  She does have some generalized abdominal cramping with it.  She denies any chest pain or shortness of breath.  She arrives by EMS.  She says that she is not anticoagulated and does not drink alcohol.  She is currently being treated with an antibiotic.  No syncope.  She has chronic but not acute back pain today      IV fluids were administered due to a transient episode of hypotension that was fluid responsive.  Patient has a leukocytosis of 18.9 and a lactate of 3.3 but I suspect that these are reactive from her vomiting and not related to sepsis.  Her hemoglobin is stable at this time, so transfusion is not warranted.      Case d/w GI  They recommend iv ppi and emergent EGD   Multiple duodenal bulb and post bulbar ulcers - one with active bleeding cauterized with bipolar cautery probe    Plan to go home on po vanc and protonix    Pt to reschedule outpt elective kyphoplasty when possible  Defer to IR  normal alignment of the spine.  There is a compression fracture of the T12 inferior endplate with approximately 25% maximum height loss anteriorly.  There is associated STIR hyperintense signal consistent with edema and associated enhancement.  No significant associated bony retropulsion.  There are discogenic degenerative endplate marrow signal changes at T6-7 and T10-T11. SPINAL CORD: No abnormal cord signal or enhancement is seen. SOFT TISSUES:  There is paraspinal mass/soft tissue swelling centered at T12. DEGENERATIVE CHANGES: There are multilevel posterior disc bulges within the mid and lower thoracic spine from T6-7 through T12-L1 with superimposed central disc protrusions at T6-7 and T12-L1. Mild spinal canal stenosis at T6-7 and T7-8, and mild to moderate spinal canal stenosis at T12-L1.     1. Acute to subacute compression fracture of the T12 inferior endplate with approximately 25% maximum height loss anteriorly. No significant associated bony retropulsion. 2. Multilevel posterior disc bulges within the mid and lower thoracic spine with superimposed central disc protrusions at T6-7 and T12-L1. Mild spinal canal stenosis at T6-7 and T7-8, and mild to moderate spinal canal stenosis at T12-L1.     MRI LUMBAR SPINE W WO CONTRAST    Result Date: 6/27/2024  EXAMINATION: MRI OF THE LUMBAR SPINE WITHOUT AND WITH CONTRAST  6/27/2024 1:00 pm TECHNIQUE: Multiplanar multisequence MRI of the lumbar spine was performed without and with the administration of intravenous contrast. COMPARISON: CT abdomen and pelvis 06/17/2024 HISTORY: ORDERING SYSTEM PROVIDED HISTORY: Acute midline thoracic back pain TECHNOLOGIST PROVIDED HISTORY: STAT Creatinine as needed:->Yes Reason for exam:->Acute thoracolumbar low back pain.  Suspect osteoporosis. History of osteopenia.  May osteoporosis by now What is the sedation requirement?->None Reason for Exam: acute thoracolumbar low back pain, suspect osteoporosis FINDINGS: BONES/ALIGNMENT:

## 2024-07-11 NOTE — PROGRESS NOTES
CLINICAL PHARMACY NOTE: MEDS TO BEDS    Total # of Prescriptions Filled: 2   The following medications were delivered to the patient:  VANCOMYCIN HCL 250MG  PANTOPRAZOLE SODIUM 40MG    Additional Documentation:  Spouse picked up in outpatient pharmacy = signed  Yoselyn Bruno - Pharmacy Tech.

## 2024-07-11 NOTE — DISCHARGE INSTRUCTIONS
Your information:  Name: Steffanie Chavez  : 1940    Your Discharge Instructions    What to do after you leave the hospital:    Read, review and familiarize yourself with the information provided below and in a separate packet on   GI Bleed, Kyphoplasty, Cdiff, UTI    Finish all doses of antibiotics. The next day, go to [facility] lab and get blood cultures and urine cultures drawn. The Interventional Radiology office will contact you with results and further instructions. The tentative date of procedure is 2024 @ 0830am.    Diet: Regular    Recommended activity:   As tolerated  Avoid strenuous activity until instructed by your physician to resume; balance rest with periods of light to normal activity.     If you experience any of the following: Unusual or inadequately controlled pain; unusual transient shortness of breath; recurrent or persistent nausea, heartburn, palpitations or lightheadedness; increased swelling; increased fatigue; fever >100; please follow up with your PCP or go to the Emergency Room.      Home Health/ Outpatient Services: Declined        What symptoms or health problems do you need to look out for after you leave the hospital? Call your physician if you have any of these symptoms.     If you have sudden, severe shortness of breath or shortness of breath while resting.  Pain, tenderness, warmth or redness in your calf.  Temperature  greater than 101?.  Persistent diarrhea, nausea or vomiting.  If you are unable to eat, or unable to drink 5 glasses of fluid a day for more than one day.  If you experience the same pain or other symptoms that brought you to the hospital.  If you become lightheaded or dizzy.      If you think something is seriously wrong go to the nearest emergency room!    Call 911 for any EMERGENCY and go to the nearest hospital!

## 2024-07-12 ENCOUNTER — TELEPHONE (OUTPATIENT)
Dept: INTERNAL MEDICINE CLINIC | Age: 84
End: 2024-07-12

## 2024-07-12 ENCOUNTER — CARE COORDINATION (OUTPATIENT)
Dept: CASE MANAGEMENT | Age: 84
End: 2024-07-12

## 2024-07-12 DIAGNOSIS — A04.72 C. DIFFICILE COLITIS: Primary | ICD-10-CM

## 2024-07-12 PROCEDURE — 1111F DSCHRG MED/CURRENT MED MERGE: CPT | Performed by: INTERNAL MEDICINE

## 2024-07-12 NOTE — CARE COORDINATION
Care Transitions Note    Initial Call - Call within 2 business days of discharge: Yes    Patient Current Location:  Home: [address] [place] [id number]    Care Transition Nurse contacted the patient by telephone to perform post hospital discharge assessment, verified name and  as identifiers. Provided introduction to self, and explanation of the Care Transition Nurse role.     Patient: Steffanie Chavez    Patient : 1940   MRN: [id number]    Reason for Admission:   Discharge Date: 24  RURS: Readmission Risk Score: 11.2      Last Discharge Facility       Date Complaint Diagnosis Description Type Department Provider    24 Hematemesis UGIB (upper gastrointestinal bleed) ... ED to Hosp-Admission (Discharged) (ADMITTED) [facility] 3T Brandon Robertson MD; Mirna Sims...            Was this an external facility discharge? No    Additional needs identified to be addressed with provider   No needs identified             Method of communication with provider: none.    Patients top risk factors for readmission: medical condition-colitis, GI bleed    Interventions to address risk factors:   Review of patient management of conditions/medications: as above    Care Summary Note: Pt states doing well, no issues or concerns. Denies diarrhea or blood in her stools.Declined making a PCP appt, Agreed to more CTC f/u calls.      Care Transition Nurse reviewed discharge instructions with patient. The patient was given an opportunity to ask questions; all questions answered at this time.. The patient verbalized understanding.   Were discharge instructions available to patient? Yes.   Reviewed appropriate site of care based on symptoms and resources available to patient including: PCP  When to call 911. The patient agrees to contact the primary care provider and/or specialist office for questions related to their healthcare.      Advance Care Planning:   Does patient have an Advance Directive: not on file; education

## 2024-07-12 NOTE — TELEPHONE ENCOUNTER
Pt decided to wait to do the hospital f/u with Dr. Villafana until she has the kyphoplasty surgery 7/30 in spine#12 will take 1 hr to an 1/2 insert according to patient.

## 2024-07-12 NOTE — PROGRESS NOTES
Physician Progress Note      PATIENT:               GOLD RAMIREZ  [facility] #:                  [id number]  :                       1940  ADMIT DATE:       2024 6:37 AM  DISCH DATE:        2024 4:15 PM  RESPONDING  PROVIDER #:        Brandon Oconnor MD          QUERY TEXT:    Pt admitted with upper GI. Pt noted to have UTI and C. Diff infection. If   possible, please document in the progress notes and discharge summary if you   are evaluating and /or treating any of the following:  The medical record reflects the following:    Risk Factors: 84 y.o. female who has a past medical history of CHF, leukemia,   CAD, diverticulosis.  Clinical Indicators:  - Labs: WBC - 18.9, Lactic acid - 3.8, UA - small   leuks, C. Diff - Positive.  - VS: BP - 68/44 >81/49 > 95/51, HR - 138.    - ED - presents to the ED complaining of onset early this morning around 2 AM   of dark vomiting with dark-colored \"chocolate syrup\" diarrhea.  She has never   had symptoms like this before.  She does have some generalized abdominal   cramping with it.  - IM H+P - Patient has a leukocytosis of 18.9 and a   lactate of 3.3 but I suspect that these are reactive from her vomiting and not   related to sepsis. Lactic acidosis -[place]ble  Treatment: antibiotics - PO Vancomycin, 4.5L NSS bolus, EKG, serial lactic   acid/BMP/CBC, UA/BC, GI consult  Options provided:  -- This patient has Sepsis, due to C. Diff present on admission.  -- This patient has C. Diff and UTI, without Sepsis.  -- Other - I will add my own diagnosis  -- Disagree - Not applicable / Not valid  -- Disagree - Clinically unable to determine / Unknown  -- Refer to Clinical Documentation Reviewer    PROVIDER RESPONSE TEXT:    This patient has sepsis due to C. Diff which was present on admission.    Query created by: David Vyas on 2024 6:45 AM      Electronically signed by:  Brandon Oconnor MD 2024 11:23 AM

## 2024-07-13 LAB
BACTERIA BLD CULT ORG #2: NORMAL
BACTERIA BLD CULT: NORMAL

## 2024-07-15 DIAGNOSIS — Z01.818 PRE-OPERATIVE CLEARANCE: Primary | ICD-10-CM

## 2024-07-15 DIAGNOSIS — R82.90 UNSPECIFIED ABNORMAL FINDINGS IN URINE: ICD-10-CM

## 2024-07-16 ENCOUNTER — TELEPHONE (OUTPATIENT)
Dept: INTERNAL MEDICINE CLINIC | Age: 84
End: 2024-07-16

## 2024-07-16 ENCOUNTER — CARE COORDINATION (OUTPATIENT)
Dept: CASE MANAGEMENT | Age: 84
End: 2024-07-16

## 2024-07-16 DIAGNOSIS — A04.72 C. DIFFICILE COLITIS: Primary | ICD-10-CM

## 2024-07-16 DIAGNOSIS — N39.0 RECURRENT UTI: Primary | ICD-10-CM

## 2024-07-16 NOTE — TELEPHONE ENCOUNTER
Spoke with .  He was asking about test at the hospital doctors wanted her to get.  He was thinking that she needed another C. difficile test.  I would not recheck the C. difficile as it can remain positive even after the infection has been treated.  I have ordered a repeat UA CNS.  I have also ordered a repeat CBC and basic metabolic

## 2024-07-16 NOTE — TELEPHONE ENCOUNTER
Pt spouse Alexys called in today for Dr. Villafana in regards to status of the pt. He didn't go into further detail and that Dr. Villafana would know what's going. Alexys would like a back to further discuss when Dr. Villafana is available.    Alexys's callback# [phone]

## 2024-07-16 NOTE — CARE COORDINATION
Care Transitions     Per [facility], pt's PCP office spoke to pt and Pt decided to wait to do the hospital f/u with Dr. Villafana until she has the kyphoplasty surgery .      Patient: Steffanie Chavez Patient : 1940 MRN: [id number]    Last Discharge Facility       Date Complaint Diagnosis Description Type Department Provider    24 Hematemesis UGIB (upper gastrointestinal bleed) ... ED to Hosp-Admission (Discharged) (ADMITTED) [facility] 3T Brandon Robertson MD; Mirna Sims...                  Noted following upcoming appointments from discharge chart review:   [facility] follow up appointment(s):   Future Appointments   Date Time Provider Department Center   2024  8:30 AM [facility] SPECIAL PROCEDURES RM 4 [facility] SP PROC [facility]   2024  2:45 PM Dianne London APRN - CNP FF Cardio [facility]   2024  3:30 PM Kev Villafana DO [facility] [place] [facility]     Non-[facility]  follow up appointment(s):

## 2024-07-19 ENCOUNTER — CARE COORDINATION (OUTPATIENT)
Dept: CASE MANAGEMENT | Age: 84
End: 2024-07-19

## 2024-07-19 NOTE — CARE COORDINATION
Care Transitions Note    Follow Up Call     Patient Current Location:  Home: [address]  [place] [address]    Care Transition Nurse contacted the patient by telephone. Verified name and  as identifiers.    Additional needs identified to be addressed with provider   No needs identified                 Method of communication with provider: none.    Care Summary Note: Pt states doing well, no issues or concerns. Agreed to more CTC f/u calls.       Advance Care Planning:   Does patient have an Advance Directive: reviewed during previous call, see note. .    Medication Review:  No changes since last call.     Remote Patient Monitoring:  Offered patient enrollment in the Remote Patient Monitoring (RPM) program for in-home monitoring: future call.    Assessments:  Care Transitions Subsequent and Final Call    Subsequent and Final Calls  Do you have any ongoing symptoms?: No  Have your medications changed?: No  Do you have any questions related to your medications?: No  Do you currently have any active services?: No  Do you have any needs or concerns that I can assist you with?: No  Identified Barriers: None  Care Transitions Interventions  No Identified Needs  Other Interventions:              Follow Up Appointment:   Reviewed upcoming appointment(s).  Future Appointments         Provider Specialty Dept Phone    2024 8:30 AM (Arrive by 7:30 AM) Radiologist, [facility]; [facility]  4 Radiology [phone]    2024 2:45 PM Dianne London APRN - CNP Cardiology [phone]    2024 3:30 PM (Arrive by 3:15 PM) Kev Villafana, DO Internal Medicine [phone]            Care Transition Nurse provided contact information.  Plan for follow-up call in 6-10 days based on severity of symptoms and risk factors.  Plan for next call: self management-       Ted Hammond RN

## 2024-07-22 ENCOUNTER — TELEPHONE (OUTPATIENT)
Dept: INTERNAL MEDICINE CLINIC | Age: 84
End: 2024-07-22

## 2024-07-22 DIAGNOSIS — S22.000A CLOSED COMPRESSION FRACTURE OF THORACIC VERTEBRA, INITIAL ENCOUNTER (HCC): ICD-10-CM

## 2024-07-22 RX ORDER — TRAMADOL HYDROCHLORIDE 50 MG/1
25-50 TABLET ORAL EVERY 8 HOURS PRN
Qty: 21 TABLET | Refills: 0 | Status: SHIPPED | OUTPATIENT
Start: 2024-07-22 | End: 2024-07-29

## 2024-07-22 NOTE — TELEPHONE ENCOUNTER
I do not see evidence of hospital appointment tomorrow as stated by patient in message.     Pt was inpatient 7/9/24-7/11/24 for gi bleed   Prescribed vanc for 12 days.   Refill not appropriate. Correct?

## 2024-07-22 NOTE — TELEPHONE ENCOUNTER
Patient is calling in for  in regards to getting a medication refill for :    traMADol (ULTRAM) 50 MG tablet  Last appointment: 6/27/2024  Next appointment: 11/29/2024  Last refill: 7/5/24       Please send medication refill to :    [id number] - [place], [place] - [address] - P [phone] - F [phone]  [address], [facility] [address]  Phone: [phone]  Fax: [phone]

## 2024-07-22 NOTE — TELEPHONE ENCOUNTER
Patient is calling in for  in regards to her recent hospital visit, per patient she was prescribed vancomycin (VANCOCIN) 250 MG capsule and is currently running out of medication but want's to know if she needs a refill or if she needs to stop medication after she uses her last dose?.     Patient states she has an appointment tomorrow at the hospital but a unable to give me more information due to being in pain and having SOB. Per patient asked for  to give her a call to discuss.    Please advise.

## 2024-07-22 NOTE — TELEPHONE ENCOUNTER
Theoretically, the amount of medicine she was given should be adequate to clear the C. difficile infection.  If diarrhea returns, that is a different story.

## 2024-07-23 ENCOUNTER — PRE-PROCEDURE TELEPHONE (OUTPATIENT)
Dept: INTERVENTIONAL RADIOLOGY/VASCULAR | Age: 84
End: 2024-07-23

## 2024-07-23 NOTE — TELEPHONE ENCOUNTER
patient has been advised and voices understanding    States bms are more formed,   No diarrhea for about 4 - 5 days now     Let her know to please call should it return.

## 2024-07-23 NOTE — TELEPHONE ENCOUNTER
Spoke to patient and she stated she is scheduled for the kyphoplasty on 7.30.24 but is following up with the hospital later today to see if it will be moved up sooner or not. Patient declined to schedule follow up at this time until she has the kyphoplasty and will call to schedule.

## 2024-07-23 NOTE — PRE-PROCEDURE INSTRUCTIONS
Spoke with pt.  Last dose of ABX taken today &/23/24 at 0130.  Pt will come in for bloodwork and urinalysis/culture tomorrow 7/24/24  Kyphoplasty scheduled pending results on 7/30/24  Pre-op interview done.  Verified allergies, medications and past medical history.  Instructed on procedure, npo status, medications with restrictions and transportation.  Questions answered, verbalized understanding.

## 2024-07-25 ENCOUNTER — HOSPITAL ENCOUNTER (OUTPATIENT)
Age: 84
Discharge: HOME | End: 2024-07-25
Payer: MEDICARE

## 2024-07-25 DIAGNOSIS — A04.72 C. DIFFICILE COLITIS: ICD-10-CM

## 2024-07-25 LAB
ANION GAP SERPL CALCULATED.3IONS-SCNC: 15 MMOL/L (ref 3–16)
BASOPHILS # BLD: 0.1 K/UL (ref 0–0.2)
BASOPHILS NFR BLD: 0.4 %
BUN SERPL-MCNC: 14 MG/DL (ref 7–20)
CALCIUM SERPL-MCNC: 9.3 MG/DL (ref 8.3–10.6)
CHLORIDE SERPL-SCNC: 101 MMOL/L (ref 99–110)
CO2 SERPL-SCNC: 22 MMOL/L (ref 21–32)
CREAT SERPL-MCNC: 1.1 MG/DL (ref 0.6–1.2)
DEPRECATED RDW RBC AUTO: 13.7 % (ref 12.4–15.4)
EOSINOPHIL # BLD: 0.2 K/UL (ref 0–0.6)
EOSINOPHIL NFR BLD: 1.1 %
GFR SERPLBLD CREATININE-BSD FMLA CKD-EPI: 49 ML/MIN/{1.73_M2}
GLUCOSE SERPL-MCNC: 99 MG/DL (ref 70–99)
HCT VFR BLD AUTO: 38.6 % (ref 36–48)
HGB BLD-MCNC: 13.2 G/DL (ref 12–16)
LYMPHOCYTES # BLD: 6.2 K/UL (ref 1–5.1)
LYMPHOCYTES NFR BLD: 35.9 %
MCH RBC QN AUTO: 31.7 PG (ref 26–34)
MCHC RBC AUTO-ENTMCNC: 34.3 G/DL (ref 31–36)
MCV RBC AUTO: 92.6 FL (ref 80–100)
MONOCYTES # BLD: 0.9 K/UL (ref 0–1.3)
MONOCYTES NFR BLD: 5.3 %
NEUTROPHILS # BLD: 9.8 K/UL (ref 1.7–7.7)
NEUTROPHILS NFR BLD: 57.3 %
PATH INTERP BLD-IMP: NO
PLATELET # BLD AUTO: 361 K/UL (ref 135–450)
PMV BLD AUTO: 7.8 FL (ref 5–10.5)
POTASSIUM SERPL-SCNC: 3.6 MMOL/L (ref 3.5–5.1)
RBC # BLD AUTO: 4.17 M/UL (ref 4–5.2)
SODIUM SERPL-SCNC: 138 MMOL/L (ref 136–145)
WBC # BLD AUTO: 17.2 K/UL (ref 4–11)

## 2024-07-25 PROCEDURE — 80048 BASIC METABOLIC PNL TOTAL CA: CPT

## 2024-07-25 PROCEDURE — 85025 COMPLETE CBC W/AUTO DIFF WBC: CPT

## 2024-07-25 PROCEDURE — 36415 COLL VENOUS BLD VENIPUNCTURE: CPT

## 2024-07-26 ENCOUNTER — CARE COORDINATION (OUTPATIENT)
Dept: CASE MANAGEMENT | Age: 84
End: 2024-07-26

## 2024-07-26 ENCOUNTER — TELEPHONE (OUTPATIENT)
Dept: INTERNAL MEDICINE CLINIC | Age: 84
End: 2024-07-26

## 2024-07-26 ENCOUNTER — HOSPITAL ENCOUNTER (OUTPATIENT)
Age: 84
Setting detail: SPECIMEN
Discharge: HOME | End: 2024-07-26
Payer: MEDICARE

## 2024-07-26 LAB
BACTERIA URNS QL MICRO: ABNORMAL /HPF
BILIRUB UR QL STRIP.AUTO: NEGATIVE
CLARITY UR: ABNORMAL
COLOR UR: YELLOW
EPI CELLS #/AREA URNS AUTO: 0 /HPF (ref 0–5)
GLUCOSE UR STRIP.AUTO-MCNC: NEGATIVE MG/DL
HGB UR QL STRIP.AUTO: ABNORMAL
HYALINE CASTS #/AREA URNS AUTO: 6 /LPF (ref 0–8)
KETONES UR STRIP.AUTO-MCNC: NEGATIVE MG/DL
LEUKOCYTE ESTERASE UR QL STRIP.AUTO: ABNORMAL
NITRITE UR QL STRIP.AUTO: POSITIVE
PH UR STRIP.AUTO: 8 [PH] (ref 5–8)
PROT UR STRIP.AUTO-MCNC: ABNORMAL MG/DL
RBC CLUMPS #/AREA URNS AUTO: 0 /HPF (ref 0–4)
SP GR UR STRIP.AUTO: <=1.005 (ref 1–1.03)
UA DIPSTICK W REFLEX MICRO PNL UR: YES
URN SPEC COLLECT METH UR: ABNORMAL
UROBILINOGEN UR STRIP-ACNC: 0.2 E.U./DL
WBC #/AREA URNS AUTO: 176 /HPF (ref 0–5)

## 2024-07-26 PROCEDURE — 87186 SC STD MICRODIL/AGAR DIL: CPT

## 2024-07-26 PROCEDURE — 81001 URINALYSIS AUTO W/SCOPE: CPT

## 2024-07-26 PROCEDURE — 87077 CULTURE AEROBIC IDENTIFY: CPT

## 2024-07-26 PROCEDURE — 87086 URINE CULTURE/COLONY COUNT: CPT

## 2024-07-26 NOTE — TELEPHONE ENCOUNTER
Spoke with patients  re: fact that her urinalysis revealed worsening infection. She also recently had C Dif.  Based upon the fact that Dr. Villafana has not received the culture back it is impossible for him to prescribe an ideal Antibx without even knowing the microorganism he is treating. I ensured patient and  that Dr. Villafana will contact them as soon as he receives any results.

## 2024-07-26 NOTE — CARE COORDINATION
questions related to your medications?: No  Do you currently have any active services?: No  Do you have any needs or concerns that I can assist you with?: No  Identified Barriers: None  Care Transitions Interventions  Other Interventions:              Follow Up Appointment:   Declined to schedule SARAN appointment.  Future Appointments         Provider Specialty Dept Phone    7/30/2024 8:30 AM (Arrive by 7:30 AM) Radiologist, [facility]; [facility]ED[facility]  4 Radiology [phone]    8/22/2024 2:45 PM Dianne London APRN - CNP Cardiology [phone]    11/29/2024 3:30 PM (Arrive by 3:15 PM) Kev Villafana,  Internal Medicine [phone]            LPN Care Coordinator provided contact information.  Plan for follow-up call in 6-10 days based on severity of symptoms and risk factors.  Plan for next call: symptom management-dysuria, diarrhea, fever/chills  self management-BP, ADLs  follow-up appointment-PCP? Kyphoplasty 7/30   medication management-new or changed meds      Liliana Nichols LPN

## 2024-07-27 ENCOUNTER — TELEMEDICINE (OUTPATIENT)
Dept: INTERNAL MEDICINE CLINIC | Age: 84
End: 2024-07-27
Payer: MEDICARE

## 2024-07-27 DIAGNOSIS — N39.0 RECURRENT UTI: ICD-10-CM

## 2024-07-27 DIAGNOSIS — N39.0 URINARY TRACT INFECTION DUE TO PROTEUS: Primary | ICD-10-CM

## 2024-07-27 DIAGNOSIS — B96.4 URINARY TRACT INFECTION DUE TO PROTEUS: Primary | ICD-10-CM

## 2024-07-27 PROCEDURE — 99442 PR PHYS/QHP TELEPHONE EVALUATION 11-20 MIN: CPT | Performed by: INTERNAL MEDICINE

## 2024-07-27 RX ORDER — LEVOFLOXACIN 500 MG/1
500 TABLET, FILM COATED ORAL DAILY
Qty: 7 TABLET | Refills: 0 | Status: SHIPPED | OUTPATIENT
Start: 2024-07-27 | End: 2024-08-03

## 2024-07-27 RX ORDER — SACCHAROMYCES BOULARDII 250 MG
250 CAPSULE ORAL 2 TIMES DAILY
Qty: 60 CAPSULE | Refills: 0 | Status: SHIPPED | OUTPATIENT
Start: 2024-07-27 | End: 2024-08-26

## 2024-07-27 NOTE — PROGRESS NOTES
[facility] Physicians  Internal Medicine  Patient Encounter  Kev Villafana D.O., [facility]       Chief Complaint   Patient presents with    Urinary Tract Infection         HPI: 84-year-old female with recurrent urinary tract infections evaluated today to review most recent urinalysis and culture.  The urinalysis actually looked worse than it had.  This time she grew Proteus, greater than 100,000 CFU per mL.  Patient is not necessarily symptomatic though.  Her kyphoplasty has been postponed due to active infection.  She is fearful of taking another antibiotic because of recent treatment for C. difficile colitis.  She is no longer having diarrhea.  She denies any fever, chills, sweats.      I communicated with the patient and/or health care decision maker about Recurrent UTI.     Details of this discussion including any medical advice provided: See below    Encounter Diagnoses   Name Primary?    Urinary tract infection due to Proteus Yes    Recurrent UTI        PLAN:  #1 Start Levaquin x 7 days  #2 Start FLorastor 250 mg BID  #3 Start D-Mannose 1000 mg BID  #4 Start Cranberry supplement  #5  Push fluids  #6 urology referral  #7 await sensitivity report  #8 the kyphoplasty may need to be postponed yet again although her pain level is getting better.  She may not need the procedure at [facility] at this time.      Total Time: minutes: 11-20 minutes --15 minutes    Steffanie Chavez was evaluated through a synchronous (real-time) audio encounter. Patient identification was verified at the start of the visit. She (or guardian if applicable) is aware that this is a billable service, which includes applicable co-pays. This visit was conducted with the patient's (and/or legal guardian's) verbal consent. She has not had a related appointment within my department in the past 7 days or scheduled within the next 24 hours.   The patient was located at Home: [address] [place], [place] [id number].  The provider was located at Facility

## 2024-07-28 LAB
BACTERIA UR CULT: ABNORMAL
ORGANISM: ABNORMAL

## 2024-07-29 ENCOUNTER — TELEPHONE (OUTPATIENT)
Dept: INTERVENTIONAL RADIOLOGY/VASCULAR | Age: 84
End: 2024-07-29

## 2024-07-29 ENCOUNTER — TELEPHONE (OUTPATIENT)
Dept: INTERNAL MEDICINE CLINIC | Age: 84
End: 2024-07-29

## 2024-07-29 NOTE — TELEPHONE ENCOUNTER
FYI    Patient is calling in for Dr. Villafana in regards to having an appointment scheduled for  on 8.2.24.

## 2024-07-29 NOTE — TELEPHONE ENCOUNTER
Pt recent blood work with increased WBC and positive UA.  Discussed with Dr. Conteh, plan to reschedule kyphoplasty. Called pt and discussed with her.  She states that she is planning on making an appt with urology.

## 2024-08-02 ENCOUNTER — TELEPHONE (OUTPATIENT)
Dept: INTERNAL MEDICINE CLINIC | Age: 84
End: 2024-08-02

## 2024-08-02 ENCOUNTER — CARE COORDINATION (OUTPATIENT)
Dept: CASE MANAGEMENT | Age: 84
End: 2024-08-02

## 2024-08-02 NOTE — CARE COORDINATION
Attempted to reach patient for transitions of care follow up.  Unable to reach patient.      Outreach Attempts:   1ST CTC attempt to reach Pt regarding recent hospital discharge.  CTC left voice recording with call back number requesting a call back. Will attempt to reach patient again.    Follow Up Appointment:   Future Appointments         Provider Specialty Dept Phone    8/22/2024 2:45 PM Dianne London APRN - CNP Cardiology [phone]    11/29/2024 3:30 PM (Arrive by 3:15 PM) Kev Villafana, DO Internal Medicine [phone]            Plan for follow-up call in 6-10 days based on severity of symptoms and risk factors.     Thank You,    Kelly Longoria RN  Care Transition Coordinator  Contact Number:[phone]

## 2024-08-02 NOTE — TELEPHONE ENCOUNTER
Patient's  is calling in for  in regards to following up with him after her appt with . Per patient's  still has questions he would like for  to simplify.     Patient's  is requesting a call back

## 2024-08-05 ENCOUNTER — TELEPHONE (OUTPATIENT)
Dept: INTERNAL MEDICINE CLINIC | Age: 84
End: 2024-08-05

## 2024-08-05 DIAGNOSIS — S22.000A CLOSED COMPRESSION FRACTURE OF THORACIC VERTEBRA, INITIAL ENCOUNTER (HCC): Primary | ICD-10-CM

## 2024-08-05 NOTE — TELEPHONE ENCOUNTER
Pt spouse Alexys called back in this morning for Dr. Villafana regarding the pt to discuss the procedure his wife was supposed to have.Was told she had a UTI prior to getting the procedure. Pt when to to the urologist Dr. Almeida and there were no Uti detected. Alexys would like a callback to further discuss.     Alexys's callback# [phone]

## 2024-08-05 NOTE — TELEPHONE ENCOUNTER
Patient's  kailee is calling in for  in regards to wanting an MRI to be placed for patient. Per patient was advised by radiologist to have a follow up MRI ordered.     Please call patient once Mri is order if okay to have done .

## 2024-08-08 DIAGNOSIS — S22.000A CLOSED COMPRESSION FRACTURE OF THORACIC VERTEBRA, INITIAL ENCOUNTER (HCC): ICD-10-CM

## 2024-08-08 RX ORDER — TRAMADOL HYDROCHLORIDE 50 MG/1
50 TABLET ORAL EVERY 8 HOURS PRN
Qty: 21 TABLET | Refills: 0 | Status: SHIPPED | OUTPATIENT
Start: 2024-08-08 | End: 2024-08-15

## 2024-08-08 NOTE — TELEPHONE ENCOUNTER
Last appointment: 7/27/2024  Next appointment: 11/29/2024  Last refill: 7/22/24    Requested Prescriptions     Pending Prescriptions Disp Refills    traMADol (ULTRAM) 50 MG tablet [Pharmacy Med Name: TRAMADOL 50MG TABLETS] 21 tablet      Sig: TAKE 1/2 TO 1 TABLET BY MOUTH EVERY 8 HOURS FOR UP TO 7 DAYS AS NEEDED FOR PAIN. MAX DAILY AMOUNT: 150 MG

## 2024-08-09 ENCOUNTER — CARE COORDINATION (OUTPATIENT)
Dept: CASE MANAGEMENT | Age: 84
End: 2024-08-09

## 2024-08-09 NOTE — CARE COORDINATION
Care Transitions Note    Final Call     Patient Current Location:  Home: [facility] [address]  [facility] [address]    Care Transition Nurse contacted the patient by telephone. Verified name and  as identifiers.    Patient graduated from the Care Transitions program on 2024.  Patient/family verbalizes confidence in the ability to self-manage at this time..      Advance Care Planning:   Does patient have an Advance Directive: reviewed during previous call, see note. .    Handoff:   Patient/family agreeable to ACM outreach.      Care Summary Note: Pt states doing well, no issues or concerns. Agreed to ACM f/u calls.      Assessments:  Care Transitions Subsequent and Final Call    Subsequent and Final Calls  Do you have any ongoing symptoms?: No  Have your medications changed?: No  Do you have any questions related to your medications?: No  Do you currently have any active services?: No  Do you have any needs or concerns that I can assist you with?: No  Identified Barriers: None  Care Transitions Interventions  No Identified Needs  Other Interventions:              Upcoming Appointments:    Future Appointments         Provider Specialty Dept Phone    2024 2:45 PM (Arrive by 2:00 PM) [facility]  2 Radiology [phone]    2024 2:45 PM Dianne London, APRN - CNP Cardiology [phone]    2024 3:30 PM (Arrive by 3:15 PM) Kev Villafana, DO Internal Medicine [phone]            Patient has agreed to contact primary care provider and/or specialist for any further questions, concerns, or needs.    Ted Hammond RN

## 2024-08-10 PROBLEM — Z01.818 PRE-OPERATIVE CLEARANCE: Status: RESOLVED | Noted: 2024-07-11 | Resolved: 2024-08-10

## 2024-08-12 ENCOUNTER — CARE COORDINATION (OUTPATIENT)
Dept: CARE COORDINATION | Age: 84
End: 2024-08-12

## 2024-08-12 NOTE — CARE COORDINATION
Ambulatory Care Coordination Note     8/12/2024 4:34 PM     ACM outreach attempt by this ACM today to perform care management follow up . ACM was unable to reach the patient by telephone today; voicemail full and unable to leave a message.      ACM: Sara Xie RN     Care Summary Note: Call to patient, \" busy\" message      PCP/Specialist follow up:   Future Appointments         Provider Specialty Dept Phone    8/16/2024 2:45 PM (Arrive by 2:00 PM) [facility] MRI  2 Radiology [phone]    8/22/2024 2:45 PM Dianne London APRN - CNP Cardiology [phone]    11/29/2024 3:30 PM (Arrive by 3:15 PM) Kev Villafana,  Internal Medicine [phone]            Follow Up:   Plan for next ACM outreach in approximately 1 week to complete:  - follow-up appointment with MRI .             
None

## 2024-08-16 ENCOUNTER — CARE COORDINATION (OUTPATIENT)
Dept: CARE COORDINATION | Age: 84
End: 2024-08-16

## 2024-08-16 ENCOUNTER — HOSPITAL ENCOUNTER (OUTPATIENT)
Dept: MRI IMAGING | Age: 84
Discharge: HOME | End: 2024-08-16
Attending: INTERNAL MEDICINE
Payer: MEDICARE

## 2024-08-16 DIAGNOSIS — S22.000A CLOSED COMPRESSION FRACTURE OF THORACIC VERTEBRA, INITIAL ENCOUNTER (HCC): ICD-10-CM

## 2024-08-16 PROCEDURE — A9577 INJ MULTIHANCE: HCPCS | Performed by: INTERNAL MEDICINE

## 2024-08-16 PROCEDURE — 72157 MRI CHEST SPINE W/O & W/DYE: CPT

## 2024-08-16 PROCEDURE — 6360000004 HC RX CONTRAST MEDICATION: Performed by: INTERNAL MEDICINE

## 2024-08-16 RX ADMIN — GADOBENATE DIMEGLUMINE 15 ML: 529 INJECTION, SOLUTION INTRAVENOUS at 15:19

## 2024-08-16 NOTE — CARE COORDINATION
Ambulatory Care Coordination Note     2024 9:04 AM     Patient Current Location:  Home: [facility] [place] [facility] [id number]     ACM contacted the patient by telephone. Verified name and  with patient as identifiers.         ACM: Sara Xie RN     Challenges to be reviewed by the provider   Additional needs identified to be addressed with provider No  medications-taking cephalexin from Dr Bills , started 8/3    Having second MRI today  Diarrhea has subsided  Does not take vitamins/ supplements every day                   Method of communication with provider: chart routing.    Care Summary Note:   Has battled GIB due ti taking NSAIDs, Excedrin  Loves lobster - has shellfish allergy  Reports has wheat allergy - sometimes eats wheat  Takes several vitamins/ supplements but doesn't take every day    D Mannose- no fillers, additives taking ..     Dr Almeida recommended probiotic. And she is taking   Taking Fibermucil- once a day  Avoid food allergies.    Second MRI due to falling sideways in to bath tub while trying to clean tub..  Make sure no further damage since first MRI because fell in bath tub    Has not had surgery-dr del real ? reyes        Offered patient enrollment in the Remote Patient Monitoring (RPM) program for in-home monitoring: Yes, but did not enroll at this time: already monitoring with home equipment.     Assessments Completed:   No changes since last call    Medications Reviewed:   Completed during a previous call     Advance Care Planning:   Reviewed and current     Care Planning:   Not completed during this call    PCP/Specialist follow up:   Future Appointments         Provider Specialty Dept Phone    2024 2:45 PM (Arrive by 2:00 PM) [facility] MRI  2 Radiology [phone]    2024 2:45 PM Dianne London APRN - CNP Cardiology [phone]    2024 3:30 PM (Arrive by 3:15 PM) Kev Villafana, DO Internal Medicine [phone]            Follow Up:   Plan for

## 2024-08-19 ENCOUNTER — PRE-PROCEDURE TELEPHONE (OUTPATIENT)
Dept: INTERVENTIONAL RADIOLOGY/VASCULAR | Age: 84
End: 2024-08-19

## 2024-08-19 NOTE — PROGRESS NOTES
Spoke with patient and , pre procedure ( kyphoplasty) instructions given.  Instructed on procedure, npo status, medications with restrictions and transportation.    Pt is to go to Dr. Almeida's office for straight cath/ urine culture no later than 8/21/24.  Questions answered, verbalized understanding.

## 2024-08-26 ENCOUNTER — HOSPITAL ENCOUNTER (OUTPATIENT)
Dept: INTERVENTIONAL RADIOLOGY/VASCULAR | Age: 84
Discharge: HOME | End: 2024-08-26
Payer: MEDICARE

## 2024-08-26 VITALS
OXYGEN SATURATION: 94 % | RESPIRATION RATE: 15 BRPM | TEMPERATURE: 96.8 F | DIASTOLIC BLOOD PRESSURE: 87 MMHG | BODY MASS INDEX: 31.28 KG/M2 | SYSTOLIC BLOOD PRESSURE: 121 MMHG | HEART RATE: 71 BPM | WEIGHT: 170 LBS | HEIGHT: 62 IN

## 2024-08-26 DIAGNOSIS — S22.080K: ICD-10-CM

## 2024-08-26 DIAGNOSIS — S22.040G: Primary | ICD-10-CM

## 2024-08-26 LAB
ANION GAP SERPL CALCULATED.3IONS-SCNC: 15 MMOL/L (ref 3–16)
BUN SERPL-MCNC: 13 MG/DL (ref 7–20)
CALCIUM SERPL-MCNC: 9.1 MG/DL (ref 8.3–10.6)
CHLORIDE SERPL-SCNC: 104 MMOL/L (ref 99–110)
CO2 SERPL-SCNC: 21 MMOL/L (ref 21–32)
CREAT SERPL-MCNC: 0.6 MG/DL (ref 0.6–1.2)
DEPRECATED RDW RBC AUTO: 13.5 % (ref 12.4–15.4)
GFR SERPLBLD CREATININE-BSD FMLA CKD-EPI: 88 ML/MIN/{1.73_M2}
GLUCOSE SERPL-MCNC: 105 MG/DL (ref 70–99)
HCT VFR BLD AUTO: 42.6 % (ref 36–48)
HGB BLD-MCNC: 14.2 G/DL (ref 12–16)
INR PPP: 1.05 (ref 0.85–1.15)
MCH RBC QN AUTO: 30.5 PG (ref 26–34)
MCHC RBC AUTO-ENTMCNC: 33.3 G/DL (ref 31–36)
MCV RBC AUTO: 91.7 FL (ref 80–100)
PLATELET # BLD AUTO: 263 K/UL (ref 135–450)
PMV BLD AUTO: 7.2 FL (ref 5–10.5)
POTASSIUM SERPL-SCNC: 3.6 MMOL/L (ref 3.5–5.1)
PROTHROMBIN TIME: 13.9 SEC (ref 11.9–14.9)
RBC # BLD AUTO: 4.64 M/UL (ref 4–5.2)
SODIUM SERPL-SCNC: 140 MMOL/L (ref 136–145)
WBC # BLD AUTO: 10.4 K/UL (ref 4–11)

## 2024-08-26 PROCEDURE — 2580000003 HC RX 258: Performed by: RADIOLOGY

## 2024-08-26 PROCEDURE — 99153 MOD SED SAME PHYS/QHP EA: CPT

## 2024-08-26 PROCEDURE — A9579 GAD-BASE MR CONTRAST NOS,1ML: HCPCS

## 2024-08-26 PROCEDURE — 36415 COLL VENOUS BLD VENIPUNCTURE: CPT

## 2024-08-26 PROCEDURE — 85610 PROTHROMBIN TIME: CPT

## 2024-08-26 PROCEDURE — 22515 PERQ VERTEBRAL AUGMENTATION: CPT

## 2024-08-26 PROCEDURE — 99152 MOD SED SAME PHYS/QHP 5/>YRS: CPT

## 2024-08-26 PROCEDURE — 80048 BASIC METABOLIC PNL TOTAL CA: CPT

## 2024-08-26 PROCEDURE — 6360000004 HC RX CONTRAST MEDICATION

## 2024-08-26 PROCEDURE — 7100000011 HC PHASE II RECOVERY - ADDTL 15 MIN: Performed by: RADIOLOGY

## 2024-08-26 PROCEDURE — 2500000003 HC RX 250 WO HCPCS: Performed by: RADIOLOGY

## 2024-08-26 PROCEDURE — 7100000010 HC PHASE II RECOVERY - FIRST 15 MIN: Performed by: RADIOLOGY

## 2024-08-26 PROCEDURE — 22513 PERQ VERTEBRAL AUGMENTATION: CPT

## 2024-08-26 PROCEDURE — 6360000002 HC RX W HCPCS: Performed by: RADIOLOGY

## 2024-08-26 PROCEDURE — C1894 INTRO/SHEATH, NON-LASER: HCPCS

## 2024-08-26 PROCEDURE — 2720000010 IR KYPHOPLASTY THORACIC 1 VERTEBRAL BODY

## 2024-08-26 PROCEDURE — 85027 COMPLETE CBC AUTOMATED: CPT

## 2024-08-26 RX ORDER — MIDAZOLAM HYDROCHLORIDE 5 MG/ML
INJECTION, SOLUTION INTRAMUSCULAR; INTRAVENOUS PRN
Status: COMPLETED | OUTPATIENT
Start: 2024-08-26 | End: 2024-08-26

## 2024-08-26 RX ORDER — KETOROLAC TROMETHAMINE 30 MG/ML
INJECTION, SOLUTION INTRAMUSCULAR; INTRAVENOUS PRN
Status: COMPLETED | OUTPATIENT
Start: 2024-08-26 | End: 2024-08-26

## 2024-08-26 RX ORDER — BUPIVACAINE HYDROCHLORIDE 5 MG/ML
10 INJECTION, SOLUTION EPIDURAL; INTRACAUDAL
Status: COMPLETED | OUTPATIENT
Start: 2024-08-26 | End: 2024-08-26

## 2024-08-26 RX ORDER — FENTANYL CITRATE 50 UG/ML
INJECTION, SOLUTION INTRAMUSCULAR; INTRAVENOUS PRN
Status: COMPLETED | OUTPATIENT
Start: 2024-08-26 | End: 2024-08-26

## 2024-08-26 RX ORDER — LIDOCAINE HYDROCHLORIDE 10 MG/ML
10 INJECTION, SOLUTION EPIDURAL; INFILTRATION; INTRACAUDAL; PERINEURAL ONCE
Status: COMPLETED | OUTPATIENT
Start: 2024-08-26 | End: 2024-08-26

## 2024-08-26 RX ADMIN — MIDAZOLAM HYDROCHLORIDE 0.5 MG: 5 INJECTION, SOLUTION INTRAMUSCULAR; INTRAVENOUS at 09:07

## 2024-08-26 RX ADMIN — FENTANYL CITRATE 25 MCG: 50 INJECTION, SOLUTION INTRAMUSCULAR; INTRAVENOUS at 09:20

## 2024-08-26 RX ADMIN — FENTANYL CITRATE 25 MCG: 50 INJECTION, SOLUTION INTRAMUSCULAR; INTRAVENOUS at 09:42

## 2024-08-26 RX ADMIN — MIDAZOLAM HYDROCHLORIDE 1 MG: 5 INJECTION, SOLUTION INTRAMUSCULAR; INTRAVENOUS at 08:54

## 2024-08-26 RX ADMIN — MIDAZOLAM HYDROCHLORIDE 0.5 MG: 5 INJECTION, SOLUTION INTRAMUSCULAR; INTRAVENOUS at 09:42

## 2024-08-26 RX ADMIN — MIDAZOLAM HYDROCHLORIDE 1 MG: 5 INJECTION, SOLUTION INTRAMUSCULAR; INTRAVENOUS at 08:45

## 2024-08-26 RX ADMIN — FENTANYL CITRATE 25 MCG: 50 INJECTION, SOLUTION INTRAMUSCULAR; INTRAVENOUS at 09:06

## 2024-08-26 RX ADMIN — MIDAZOLAM HYDROCHLORIDE 0.5 MG: 5 INJECTION, SOLUTION INTRAMUSCULAR; INTRAVENOUS at 09:12

## 2024-08-26 RX ADMIN — FENTANYL CITRATE 25 MCG: 50 INJECTION, SOLUTION INTRAMUSCULAR; INTRAVENOUS at 09:38

## 2024-08-26 RX ADMIN — FENTANYL CITRATE 25 MCG: 50 INJECTION, SOLUTION INTRAMUSCULAR; INTRAVENOUS at 10:01

## 2024-08-26 RX ADMIN — KETOROLAC TROMETHAMINE 15 MG: 30 INJECTION, SOLUTION INTRAMUSCULAR; INTRAVENOUS at 09:29

## 2024-08-26 RX ADMIN — FENTANYL CITRATE 25 MCG: 50 INJECTION, SOLUTION INTRAMUSCULAR; INTRAVENOUS at 09:28

## 2024-08-26 RX ADMIN — GADOTERIDOL: 279.3 INJECTION, SOLUTION INTRAVENOUS at 10:14

## 2024-08-26 RX ADMIN — BUPIVACAINE HYDROCHLORIDE 10 MG: 5 INJECTION, SOLUTION EPIDURAL; INTRACAUDAL at 10:12

## 2024-08-26 RX ADMIN — MIDAZOLAM HYDROCHLORIDE 0.5 MG: 5 INJECTION, SOLUTION INTRAMUSCULAR; INTRAVENOUS at 09:38

## 2024-08-26 RX ADMIN — LIDOCAINE HYDROCHLORIDE 10 ML: 10 INJECTION, SOLUTION EPIDURAL; INFILTRATION; INTRACAUDAL; PERINEURAL at 10:14

## 2024-08-26 RX ADMIN — KETOROLAC TROMETHAMINE 15 MG: 30 INJECTION, SOLUTION INTRAMUSCULAR; INTRAVENOUS at 09:56

## 2024-08-26 RX ADMIN — MIDAZOLAM HYDROCHLORIDE 0.5 MG: 5 INJECTION, SOLUTION INTRAMUSCULAR; INTRAVENOUS at 09:20

## 2024-08-26 RX ADMIN — FENTANYL CITRATE 25 MCG: 50 INJECTION, SOLUTION INTRAMUSCULAR; INTRAVENOUS at 09:11

## 2024-08-26 RX ADMIN — MIDAZOLAM HYDROCHLORIDE 0.5 MG: 5 INJECTION, SOLUTION INTRAMUSCULAR; INTRAVENOUS at 09:28

## 2024-08-26 RX ADMIN — CEFAZOLIN 2000 MG: 1 INJECTION, POWDER, FOR SOLUTION INTRAVENOUS at 08:46

## 2024-08-26 RX ADMIN — FENTANYL CITRATE 50 MCG: 50 INJECTION, SOLUTION INTRAMUSCULAR; INTRAVENOUS at 08:54

## 2024-08-26 ASSESSMENT — PAIN - FUNCTIONAL ASSESSMENT
PAIN_FUNCTIONAL_ASSESSMENT: 0-10

## 2024-08-26 NOTE — PRE SEDATION
7/9/2024).    Medications:   Scheduled Meds:    gadoteridol         Continuous Infusions:   PRN Meds: gadoteridol  Home Meds:   Prior to Admission medications    Medication Sig Start Date End Date Taking? Authorizing Provider   saccharomyces boulardii (FLORASTOR) 250 MG capsule Take 1 capsule by mouth 2 times daily 7/27/24 8/26/24  Kev Villafana DO   D-Mannose 500 MG CAPS Take 1,000 mg by mouth in the morning and at bedtime 7/27/24   Kev Villafana DO   pantoprazole (PROTONIX) 40 MG tablet Take 1 tablet by mouth every morning (before breakfast) 7/11/24   Brandon Robertson MD   D-Mannose 500 MG CAPS Take 1 Capful by mouth in the morning and at bedtime 7/5/24   Kev Villafana DO   amLODIPine (NORVASC) 5 MG tablet Take 1 tablet by mouth daily 6/17/24   Kev Villafana DO   turmeric 500 MG CAPS Take by mouth daily    Jeremy Spicer MD   Ginkgo Biloba 40 MG TABS Take by mouth    Jeremy Spicer MD   Glucosamine 500 MG CAPS Take by mouth    Jeremy Spicer MD   Probiotic Product (PROBIOTIC-10 PO) Take by mouth    Jeremy Spicer MD   NONFORMULARY daily Liver vitamin    Jeremy Spicer MD   levocetirizine (XYZAL) 5 MG tablet Take 1 tablet by mouth nightly as needed 10/26/21   Kev Villafana DO   magnesium 30 MG tablet Take 1 tablet by mouth 2 times daily    Jeremy Spicer MD   Coenzyme Q10 (COQ10) 100 MG CAPS Take by mouth as needed    Jeremy Spicer MD   Vitamin D-Vitamin K (K2 PLUS D3 PO) Take by mouth    Jeremy Spicer MD   Multiple Vitamins-Minerals (MULTIVITAMIN ADULT PO) Take by mouth    Jeremy Spicer MD   Cranberry 125 MG TABS Take by mouth    Jeremy Spicer MD   Phytosterol Esters (CHOLEST CARE PO) Take  by mouth.  Patient not taking: Reported on 8/16/2024    Jeremy Spicer MD     Coumadin Use Last 7 Days:  no  Antiplatelet drug therapy use last 7 days: no  Other anticoagulant use last 7 days: no  Additional Medication Information:   n/a      Pre-Sedation Documentation and Exam:   I have reviewed the patient's history and review of systems.    Mallampati Airway Assessment:  Mallampati Class II - (soft palate, fauces & uvula are visible)    Prior History of Anesthesia Complications:   none    ASA Classification:  Class 3 - A patient with severe systemic disease that limits activity but is not incapacitating    Sedation/ Anesthesia Plan:   intravenous sedation    Medications Planned:   midazolam (Versed) intravenously and fentanyl intravenously    Patient is an appropriate candidate for plan of sedation: yes    Electronically signed by Catrachito Conteh MD on 8/26/2024 at 10:05 AM

## 2024-08-26 NOTE — BRIEF OP NOTE
Brief Postoperative Note    Steffanie Chavez  YOB: 1940  [id number]    Pre-operative Diagnosis: T4 and T12 compression fracture    Post-operative Diagnosis: Same    Procedure: T4 and T12 Kyphoplasty    Anesthesia: Moderate Sedation    Surgeons: Catrachito Conteh MD    Estimated Blood Loss: Less than 5 mL    Complications: None    Specimens: Was Not Obtained    Findings: Successful T4 and T12 kyphoplasty.    Electronically signed by Catrachito Conteh MD on 8/26/2024 at 10:06 AM

## 2024-08-26 NOTE — PROGRESS NOTES
Pt up walking in espinosa, pt has no complaints of pain, Dr. Conteh to bedside spoke with pt, pts IV removed and pt discharged home in stable condition with .

## 2024-08-26 NOTE — DISCHARGE INSTRUCTIONS
Follow up with   with any questions, concerns, results, and an appointment after your procedure in the time period recommended.         Kyphoplasty Instructions       Fatigue is common and expected.  Let pain be your guide.  Walking or standing is encouraged every hour that you are awake; gradually increase walking to 1-2 miles per day.    It is OK to go up and down stairs and to walk outside.    Do NOT smoke.  Smoking delays healing and increases the risk of infection.    Apply ice intermittently to incision area for the first two days.  Then you may use heat instead if desired.      Incision Care  Do not submerge your incision under water.  No tub baths, hot tubs or swimming until after your doctor’s approval.  (Usually two weeks)    Incisions normally have a small amount of clear or pink drainage. If necessary, cover with a gauze and tape dressing.  If you have a temperature of 101 degrees (unrelieved by Tylenol), or a large amount of drainage or foul smelling drainage, call your doctor right away.    You may remove the bandaid and shower after 24 hours.  Pat incision dry after shower.       SEDATION DISCHARGE INSTRUCTIONS   8/26/2024    Wear your seatbelt home.  You are under the influence of sedative medications. Do not drink alcohol, drive, operate machinery, or make any important decisions or sign any legal documents for 24 hours.  A responsible adult needs to be with you for 24 hours.  You may experience lightheadedness, dizziness, nausea, heightened emotions and/or sleepiness following surgery.  Rest at home today- increase activity as tolerated.  Progress slowly to a regular diet and drink plenty of fluids unless your physician has instructed you otherwise.  If nausea becomes a problem, call your physician.  Coughing, sore throat, and muscle aches are other side effects of anesthesia and should improve with time.  Do not drive or operate machinery while taking narcotics.  ATTENTION FEMALE PATIENTS: if

## 2024-08-26 NOTE — PROGRESS NOTES
Pt received from radiology post kyphoplasty, pt drowsy but awakens to name, resp even and easy, dressings to back have small amount of drainage on them, pt able to move all extremities. Bedside report received from Roberta SCHULTZ.

## 2024-08-27 NOTE — PROGRESS NOTES
Spoke to patient post kyphoplasty procedure.   Patient denies any discomfort post procedure.  I addressed all the patients concerns, and directed patient to contact Special Procedures if they had any further questions.

## 2024-09-03 ENCOUNTER — CARE COORDINATION (OUTPATIENT)
Dept: CARE COORDINATION | Age: 84
End: 2024-09-03

## 2024-09-03 DIAGNOSIS — S22.000A CLOSED COMPRESSION FRACTURE OF THORACIC VERTEBRA, INITIAL ENCOUNTER (HCC): ICD-10-CM

## 2024-09-03 NOTE — CARE COORDINATION
Ambulatory Care Coordination Note     9/3/2024 3:33 PM     ACM outreach attempt by this ACM today to perform care management follow up . ACM was unable to reach the patient by telephone today; left voice message requesting a return phone call to this ACM.     ACM: Sara Xie RN     Care Summary Note: kyphoplasty 8/26 follow up    PCP/Specialist follow up:   Future Appointments         Provider Specialty Dept Phone    11/29/2024 3:30 PM (Arrive by 3:15 PM) Kev Villafana, DO Internal Medicine [phone]            Follow Up:   Plan for next ACM outreach in approximately 1 week to complete:  - disease specific assessments  - medication review  - education   - follow-up appointment with surgeon .

## 2024-09-04 NOTE — TELEPHONE ENCOUNTER
Last appointment: 7/27/2024  Next appointment: 11/29/2024  Last refill: 8/8/24    Requested Prescriptions     Pending Prescriptions Disp Refills    traMADol (ULTRAM) 50 MG tablet [Pharmacy Med Name: TRAMADOL 50MG TABLETS] 21 tablet      Sig: TAKE 1/2 TO 1 TABLET EVERY 8 HOURS AS NEEDED FOR PAIN FOR UP TO 7 DAYS. MAX DAILY AMOUNT: 150MG.

## 2024-09-04 NOTE — TELEPHONE ENCOUNTER
Please confirm with the patient that she actually needs this.  Patient is status post kyphoplasty.  She should not be having much pain at this time

## 2024-09-05 RX ORDER — TRAMADOL HYDROCHLORIDE 50 MG/1
50 TABLET ORAL EVERY 8 HOURS PRN
Qty: 21 TABLET | Refills: 0 | Status: SHIPPED | OUTPATIENT
Start: 2024-09-05 | End: 2024-09-12

## 2024-09-05 NOTE — TELEPHONE ENCOUNTER
Pt states she does need the medication,   States definitely needs this medication.  C/O Right sided pain and lower back pain.   She believes she may have a kidney stone,   Based on the pain

## 2024-09-09 ENCOUNTER — TELEPHONE (OUTPATIENT)
Dept: INTERNAL MEDICINE CLINIC | Age: 84
End: 2024-09-09

## 2024-09-10 ENCOUNTER — CARE COORDINATION (OUTPATIENT)
Dept: CARE COORDINATION | Age: 84
End: 2024-09-10

## 2024-09-13 ENCOUNTER — HOSPITAL ENCOUNTER (INPATIENT)
Age: 84
LOS: 2 days | Discharge: HOME | DRG: 690 | End: 2024-09-15
Attending: STUDENT IN AN ORGANIZED HEALTH CARE EDUCATION/TRAINING PROGRAM | Admitting: INTERNAL MEDICINE
Payer: MEDICARE

## 2024-09-13 ENCOUNTER — OFFICE VISIT (OUTPATIENT)
Dept: INTERNAL MEDICINE CLINIC | Age: 84
End: 2024-09-13

## 2024-09-13 ENCOUNTER — APPOINTMENT (OUTPATIENT)
Dept: CT IMAGING | Age: 84
DRG: 690 | End: 2024-09-13
Payer: MEDICARE

## 2024-09-13 VITALS
BODY MASS INDEX: 30.18 KG/M2 | OXYGEN SATURATION: 97 % | DIASTOLIC BLOOD PRESSURE: 82 MMHG | WEIGHT: 165 LBS | HEART RATE: 127 BPM | SYSTOLIC BLOOD PRESSURE: 116 MMHG | TEMPERATURE: 97.3 F

## 2024-09-13 DIAGNOSIS — N30.00 ACUTE CYSTITIS WITHOUT HEMATURIA: ICD-10-CM

## 2024-09-13 DIAGNOSIS — R10.9 RIGHT FLANK PAIN: ICD-10-CM

## 2024-09-13 DIAGNOSIS — M54.59 INTRACTABLE LOW BACK PAIN: Primary | ICD-10-CM

## 2024-09-13 DIAGNOSIS — M54.9 INTRACTABLE BACK PAIN: Primary | ICD-10-CM

## 2024-09-13 DIAGNOSIS — S32.010A CLOSED COMPRESSION FRACTURE OF BODY OF L1 VERTEBRA (HCC): ICD-10-CM

## 2024-09-13 LAB
ALBUMIN SERPL-MCNC: 4 G/DL (ref 3.4–5)
ALP SERPL-CCNC: 128 U/L (ref 40–129)
ALT SERPL-CCNC: 21 U/L (ref 10–40)
ANION GAP SERPL CALCULATED.3IONS-SCNC: 12 MMOL/L (ref 3–16)
AST SERPL-CCNC: 30 U/L (ref 15–37)
BACTERIA URNS QL MICRO: ABNORMAL /HPF
BASOPHILS # BLD: 0.1 K/UL (ref 0–0.2)
BASOPHILS NFR BLD: 0.7 %
BILIRUB DIRECT SERPL-MCNC: 0.2 MG/DL (ref 0–0.3)
BILIRUB INDIRECT SERPL-MCNC: 0.2 MG/DL (ref 0–1)
BILIRUB SERPL-MCNC: 0.4 MG/DL (ref 0–1)
BILIRUB UR QL STRIP.AUTO: NEGATIVE
BUN SERPL-MCNC: 23 MG/DL (ref 7–20)
CALCIUM SERPL-MCNC: 9.1 MG/DL (ref 8.3–10.6)
CHLORIDE SERPL-SCNC: 103 MMOL/L (ref 99–110)
CLARITY UR: CLEAR
CO2 SERPL-SCNC: 25 MMOL/L (ref 21–32)
COLOR UR: YELLOW
CREAT SERPL-MCNC: 0.7 MG/DL (ref 0.6–1.2)
CRP SERPL-MCNC: 3.6 MG/L (ref 0–5.1)
DEPRECATED RDW RBC AUTO: 13.1 % (ref 12.4–15.4)
EOSINOPHIL # BLD: 0.1 K/UL (ref 0–0.6)
EOSINOPHIL NFR BLD: 1.4 %
EPI CELLS #/AREA URNS AUTO: 1 /HPF (ref 0–5)
ERYTHROCYTE [SEDIMENTATION RATE] IN BLOOD BY WESTERGREN METHOD: 20 MM/HR (ref 0–30)
GFR SERPLBLD CREATININE-BSD FMLA CKD-EPI: 85 ML/MIN/{1.73_M2}
GLUCOSE SERPL-MCNC: 97 MG/DL (ref 70–99)
GLUCOSE UR STRIP.AUTO-MCNC: NEGATIVE MG/DL
HCT VFR BLD AUTO: 42.7 % (ref 36–48)
HGB BLD-MCNC: 14.5 G/DL (ref 12–16)
HGB UR QL STRIP.AUTO: NEGATIVE
HYALINE CASTS #/AREA URNS AUTO: 0 /LPF (ref 0–8)
KETONES UR STRIP.AUTO-MCNC: 15 MG/DL
LEUKOCYTE ESTERASE UR QL STRIP.AUTO: ABNORMAL
LIPASE SERPL-CCNC: 25 U/L (ref 13–60)
LYMPHOCYTES # BLD: 3 K/UL (ref 1–5.1)
LYMPHOCYTES NFR BLD: 32 %
MCH RBC QN AUTO: 30.9 PG (ref 26–34)
MCHC RBC AUTO-ENTMCNC: 34 G/DL (ref 31–36)
MCV RBC AUTO: 90.9 FL (ref 80–100)
MONOCYTES # BLD: 0.5 K/UL (ref 0–1.3)
MONOCYTES NFR BLD: 5.2 %
NEUTROPHILS # BLD: 5.8 K/UL (ref 1.7–7.7)
NEUTROPHILS NFR BLD: 60.7 %
NITRITE UR QL STRIP.AUTO: POSITIVE
PH UR STRIP.AUTO: 5.5 [PH] (ref 5–8)
PLATELET # BLD AUTO: 299 K/UL (ref 135–450)
PMV BLD AUTO: 7.4 FL (ref 5–10.5)
POTASSIUM SERPL-SCNC: 4 MMOL/L (ref 3.5–5.1)
PROT SERPL-MCNC: 7 G/DL (ref 6.4–8.2)
PROT UR STRIP.AUTO-MCNC: NEGATIVE MG/DL
RBC # BLD AUTO: 4.7 M/UL (ref 4–5.2)
RBC CLUMPS #/AREA URNS AUTO: 0 /HPF (ref 0–4)
SODIUM SERPL-SCNC: 140 MMOL/L (ref 136–145)
SP GR UR STRIP.AUTO: 1.01 (ref 1–1.03)
UA COMPLETE W REFLEX CULTURE PNL UR: YES
UA DIPSTICK W REFLEX MICRO PNL UR: YES
URN SPEC COLLECT METH UR: ABNORMAL
UROBILINOGEN UR STRIP-ACNC: 0.2 E.U./DL
WBC # BLD AUTO: 9.5 K/UL (ref 4–11)
WBC #/AREA URNS AUTO: 37 /HPF (ref 0–5)

## 2024-09-13 PROCEDURE — 94760 N-INVAS EAR/PLS OXIMETRY 1: CPT

## 2024-09-13 PROCEDURE — 72131 CT LUMBAR SPINE W/O DYE: CPT

## 2024-09-13 PROCEDURE — 81001 URINALYSIS AUTO W/SCOPE: CPT

## 2024-09-13 PROCEDURE — 83690 ASSAY OF LIPASE: CPT

## 2024-09-13 PROCEDURE — 80076 HEPATIC FUNCTION PANEL: CPT

## 2024-09-13 PROCEDURE — 96374 THER/PROPH/DIAG INJ IV PUSH: CPT

## 2024-09-13 PROCEDURE — 85652 RBC SED RATE AUTOMATED: CPT

## 2024-09-13 PROCEDURE — 87086 URINE CULTURE/COLONY COUNT: CPT

## 2024-09-13 PROCEDURE — 1200000000 HC SEMI PRIVATE

## 2024-09-13 PROCEDURE — 96375 TX/PRO/DX INJ NEW DRUG ADDON: CPT

## 2024-09-13 PROCEDURE — 87077 CULTURE AEROBIC IDENTIFY: CPT

## 2024-09-13 PROCEDURE — 86140 C-REACTIVE PROTEIN: CPT

## 2024-09-13 PROCEDURE — 72128 CT CHEST SPINE W/O DYE: CPT

## 2024-09-13 PROCEDURE — 80048 BASIC METABOLIC PNL TOTAL CA: CPT

## 2024-09-13 PROCEDURE — 6360000002 HC RX W HCPCS: Performed by: PHYSICIAN ASSISTANT

## 2024-09-13 PROCEDURE — 99285 EMERGENCY DEPT VISIT HI MDM: CPT

## 2024-09-13 PROCEDURE — 85025 COMPLETE CBC W/AUTO DIFF WBC: CPT

## 2024-09-13 PROCEDURE — 87186 SC STD MICRODIL/AGAR DIL: CPT

## 2024-09-13 PROCEDURE — 74176 CT ABD & PELVIS W/O CONTRAST: CPT

## 2024-09-13 RX ORDER — TRAMADOL HYDROCHLORIDE 50 MG/1
50 TABLET ORAL EVERY 8 HOURS PRN
COMMUNITY

## 2024-09-13 RX ORDER — ONDANSETRON 2 MG/ML
4 INJECTION INTRAMUSCULAR; INTRAVENOUS ONCE
Status: COMPLETED | OUTPATIENT
Start: 2024-09-13 | End: 2024-09-13

## 2024-09-13 RX ORDER — FENTANYL CITRATE 50 UG/ML
25 INJECTION, SOLUTION INTRAMUSCULAR; INTRAVENOUS ONCE
Status: COMPLETED | OUTPATIENT
Start: 2024-09-13 | End: 2024-09-13

## 2024-09-13 RX ADMIN — FENTANYL CITRATE 25 MCG: 50 INJECTION INTRAMUSCULAR; INTRAVENOUS at 20:08

## 2024-09-13 RX ADMIN — ONDANSETRON 4 MG: 2 INJECTION INTRAMUSCULAR; INTRAVENOUS at 20:09

## 2024-09-13 SDOH — ECONOMIC STABILITY: INCOME INSECURITY: HOW HARD IS IT FOR YOU TO PAY FOR THE VERY BASICS LIKE FOOD, HOUSING, MEDICAL CARE, AND HEATING?: NOT HARD AT ALL

## 2024-09-13 SDOH — ECONOMIC STABILITY: FOOD INSECURITY: WITHIN THE PAST 12 MONTHS, YOU WORRIED THAT YOUR FOOD WOULD RUN OUT BEFORE YOU GOT MONEY TO BUY MORE.: NEVER TRUE

## 2024-09-13 ASSESSMENT — PAIN DESCRIPTION - LOCATION
LOCATION: BACK
LOCATION: BACK

## 2024-09-13 ASSESSMENT — PAIN SCALES - GENERAL
PAINLEVEL_OUTOF10: 9
PAINLEVEL_OUTOF10: 8

## 2024-09-13 ASSESSMENT — PAIN DESCRIPTION - ORIENTATION
ORIENTATION: LOWER
ORIENTATION: LOWER

## 2024-09-13 ASSESSMENT — PAIN DESCRIPTION - PAIN TYPE: TYPE: ACUTE PAIN;CHRONIC PAIN

## 2024-09-13 ASSESSMENT — PAIN - FUNCTIONAL ASSESSMENT: PAIN_FUNCTIONAL_ASSESSMENT: 0-10

## 2024-09-13 NOTE — ED NOTES
Patient oriented to room and ED throughput process.  Safety measures with ED bed locked in lowest position and call light in reach.  Patient educated on all orders, including any medications.  Patient educated on chief complaint/symptoms. Patient encouraged to ask questions regarding care, medications or treatment plan.  Patient aware of how to reach staff with questions/concerns.    
Walk in

## 2024-09-13 NOTE — ED PROVIDER NOTES
WRIST FRACTURE SURGERY  9/27/2011    ORIF left distal radius fracture       CURRENTMEDICATIONS       Previous Medications    AMLODIPINE (NORVASC) 5 MG TABLET    Take 1 tablet by mouth daily    COENZYME Q10 (COQ10) 100 MG CAPS    Take by mouth as needed    CRANBERRY 125 MG TABS    Take by mouth    D-MANNOSE 500 MG CAPS    Take 1 Capful by mouth in the morning and at bedtime    D-MANNOSE 500 MG CAPS    Take 1,000 mg by mouth in the morning and at bedtime    GINKGO BILOBA 40 MG TABS    Take by mouth    GLUCOSAMINE 500 MG CAPS    Take 1 capsule by mouth as needed    LEVOCETIRIZINE (XYZAL) 5 MG TABLET    Take 1 tablet by mouth nightly as needed    MAGNESIUM 30 MG TABLET    Take 1 tablet by mouth 2 times daily    MULTIPLE VITAMINS-MINERALS (MULTIVITAMIN ADULT PO)    Take by mouth    NONFORMULARY    daily Liver vitamin    PANTOPRAZOLE (PROTONIX) 40 MG TABLET    Take 1 tablet by mouth every morning (before breakfast)    PHYTOSTEROL ESTERS (CHOLEST CARE PO)    Take by mouth    PROBIOTIC PRODUCT (PROBIOTIC-10 PO)    Take by mouth    TRAMADOL (ULTRAM) 50 MG TABLET    Take 1 tablet by mouth every 8 hours as needed for Pain. Max Daily Amount: 150 mg    TURMERIC 500 MG CAPS    Take by mouth daily    VITAMIN D-VITAMIN K (K2 PLUS D3 PO)    Take by mouth       ALLERGIES     Latex, Antihistamine [diphenhydramine hcl], Decongest, Penicillins, Biaxin [clarithromycin], Caffeine, Iodine, Rhinocort [budesonide], Sulfa antibiotics, Wheat, Codeine, and Cortisone    FAMILYHISTORY       Family History   Problem Relation Age of Onset    Heart Disease Mother     Rheum Arthritis Mother     Alcohol Abuse Father     Breast Cancer Maternal Grandmother         SOCIAL HISTORY       Social History     Tobacco Use    Smoking status: Never    Smokeless tobacco: Never   Substance Use Topics    Alcohol use: Yes     Comment: rare/once yearly    Drug use: No       SCREENINGS        [place] Coma Scale  Eye Opening: Spontaneous  Best Verbal Response:  (ROCEPHIN) 1,000 mg in sterile water 10 mL IV syringe (has no administration in time range)   fentaNYL (SUBLIMAZE) injection 25 mcg (25 mcg IntraVENous Given 9/13/24 2008)   ondansetron (ZOFRAN) injection 4 mg (4 mg IntraVENous Given 9/13/24 2009)       ED Course as of 09/13/24 2306   Fri Sep 13, 2024   2207 Sed Rate, Automated: 20 [PB]   2207 CRP: 3.6 [PB]   2207 WBC: 9.5 [PB]   2208 CT ABDOMEN PELVIS WO CONTRAST  New compression fracture of L1, 30% loss of height. [PB]   2208 Leukocyte Esterase, Urine(!): MODERATE [PB]   2208 Nitrite, Urine(!): POSITIVE [PB]   2208 Bacteria, UA(!): 4+  Will give antibiotics. [PB]   2220 Patient is visited at bedside.  Remains in stable condition.  States that her pain is adequately managed after receiving the fentanyl, about 2 hours prior.  However, she has significant concerns about the pain returning.  Had a long discussion with her regarding results and plan for outpatient management.  She expressed significant concern with her ability to perform her actives of daily living due to the pain.  Will attempt to ambulate patient then disposition accordingly. [PB]   2240 Nurse attempted to ambulate patient, but she is unable to.  States significant concern to perform her actives of daily living as her  is unreliable.  Will plan for admission. [PB]   2301 Order for consult inpatient hospitalist placed.  Message sent to inpatient team via [employer]. [PB]      ED Course User Index  [PB] Simone Jaffe, DO        Is this patient to be included in the SEP-1 Core Measure due to severe sepsis or septic shock?   No   Exclusion criteria - the patient is NOT to be included for SEP-1 Core Measure due to:  2+ SIRS criteria are not met    CONSULTS: (Who and What was discussed)  IP CONSULT TO HOSPITALIST  Discussion with Other Profesionals : None    Social Determinants : None    Records Reviewed : None    CC/HPI Summary, DDx, ED Course, and Reassessment: Patient with a history of  loss.    Patient given pain and nausea medication here in the emergency department.  We attempted to ambulate patient without success.  Patient states she does not feel comfortable going home that she is having significant difficulty getting around.    We have ordered Rocephin 1 g IV for UTI      Disposition Considerations (include 1 Tests not done, Shared Decision Making, Pt Expectation of Test or Tx.): We had a lengthy discussion with the patient regarding testing completed in the emergency department today as well as the results.  Patient is not having significant low back pain despite pain medication.  She is unable to really ambulate and without assistance.  I do not feel it is safe for her to be discharged home in this condition.  She may admission to facility for rehabilitation.  Patient is agreeable with admission.  Hospitalist is consulted.    Admission      I am the Primary Clinician of Record.    FINAL IMPRESSION      1. Intractable back pain    2. Closed compression fracture of body of L1 vertebra (HCC)    3. Acute cystitis without hematuria          DISPOSITION/PLAN     DISPOSITION Decision To Admit 09/13/2024 10:54:41 PM  Condition at Disposition: Stable      PATIENT REFERRED TO:  No follow-up provider specified.    DISCHARGE MEDICATIONS:  New Prescriptions    No medications on file       DISCONTINUED MEDICATIONS:  Discontinued Medications    No medications on file              (Please note that portions of this note were completed with a voice recognition program.  Efforts were made to edit the dictations but occasionally words are mis-transcribed.)    Michael Snider PA-C (electronically signed)           Michael Snider PA-C  09/13/24 2879

## 2024-09-14 LAB
ALBUMIN SERPL-MCNC: 3.5 G/DL (ref 3.4–5)
ALBUMIN/GLOB SERPL: 1.3 {RATIO} (ref 1.1–2.2)
ALP SERPL-CCNC: 117 U/L (ref 40–129)
ALT SERPL-CCNC: 15 U/L (ref 10–40)
ANION GAP SERPL CALCULATED.3IONS-SCNC: 12 MMOL/L (ref 3–16)
AST SERPL-CCNC: 25 U/L (ref 15–37)
BASOPHILS # BLD: 0.1 K/UL (ref 0–0.2)
BASOPHILS NFR BLD: 0.8 %
BILIRUB SERPL-MCNC: 0.4 MG/DL (ref 0–1)
BUN SERPL-MCNC: 22 MG/DL (ref 7–20)
CALCIUM SERPL-MCNC: 8.7 MG/DL (ref 8.3–10.6)
CHLORIDE SERPL-SCNC: 105 MMOL/L (ref 99–110)
CO2 SERPL-SCNC: 24 MMOL/L (ref 21–32)
CREAT SERPL-MCNC: 0.7 MG/DL (ref 0.6–1.2)
DEPRECATED RDW RBC AUTO: 13.2 % (ref 12.4–15.4)
EOSINOPHIL # BLD: 0.3 K/UL (ref 0–0.6)
EOSINOPHIL NFR BLD: 3.2 %
GFR SERPLBLD CREATININE-BSD FMLA CKD-EPI: 85 ML/MIN/{1.73_M2}
GLUCOSE SERPL-MCNC: 102 MG/DL (ref 70–99)
HCT VFR BLD AUTO: 41 % (ref 36–48)
HGB BLD-MCNC: 13.7 G/DL (ref 12–16)
LYMPHOCYTES # BLD: 3 K/UL (ref 1–5.1)
LYMPHOCYTES NFR BLD: 32.4 %
MAGNESIUM SERPL-MCNC: 1.9 MG/DL (ref 1.8–2.4)
MCH RBC QN AUTO: 30.4 PG (ref 26–34)
MCHC RBC AUTO-ENTMCNC: 33.4 G/DL (ref 31–36)
MCV RBC AUTO: 91.1 FL (ref 80–100)
MONOCYTES # BLD: 0.7 K/UL (ref 0–1.3)
MONOCYTES NFR BLD: 7.6 %
NEUTROPHILS # BLD: 5.1 K/UL (ref 1.7–7.7)
NEUTROPHILS NFR BLD: 56 %
PLATELET # BLD AUTO: 269 K/UL (ref 135–450)
PMV BLD AUTO: 7.3 FL (ref 5–10.5)
POTASSIUM SERPL-SCNC: 3.5 MMOL/L (ref 3.5–5.1)
PROT SERPL-MCNC: 6.3 G/DL (ref 6.4–8.2)
RBC # BLD AUTO: 4.5 M/UL (ref 4–5.2)
SODIUM SERPL-SCNC: 141 MMOL/L (ref 136–145)
WBC # BLD AUTO: 9.2 K/UL (ref 4–11)

## 2024-09-14 PROCEDURE — 85025 COMPLETE CBC W/AUTO DIFF WBC: CPT

## 2024-09-14 PROCEDURE — 2580000003 HC RX 258: Performed by: STUDENT IN AN ORGANIZED HEALTH CARE EDUCATION/TRAINING PROGRAM

## 2024-09-14 PROCEDURE — 6370000000 HC RX 637 (ALT 250 FOR IP): Performed by: NURSE PRACTITIONER

## 2024-09-14 PROCEDURE — 1200000000 HC SEMI PRIVATE

## 2024-09-14 PROCEDURE — 6360000002 HC RX W HCPCS: Performed by: STUDENT IN AN ORGANIZED HEALTH CARE EDUCATION/TRAINING PROGRAM

## 2024-09-14 PROCEDURE — 6360000002 HC RX W HCPCS: Performed by: INTERNAL MEDICINE

## 2024-09-14 PROCEDURE — 2580000003 HC RX 258: Performed by: INTERNAL MEDICINE

## 2024-09-14 PROCEDURE — 80053 COMPREHEN METABOLIC PANEL: CPT

## 2024-09-14 PROCEDURE — 83735 ASSAY OF MAGNESIUM: CPT

## 2024-09-14 PROCEDURE — 36415 COLL VENOUS BLD VENIPUNCTURE: CPT

## 2024-09-14 RX ORDER — MAGNESIUM SULFATE IN WATER 40 MG/ML
2000 INJECTION, SOLUTION INTRAVENOUS PRN
Status: DISCONTINUED | OUTPATIENT
Start: 2024-09-14 | End: 2024-09-15 | Stop reason: HOSPADM

## 2024-09-14 RX ORDER — OXYCODONE HYDROCHLORIDE 5 MG/1
5 TABLET ORAL EVERY 4 HOURS PRN
Status: DISCONTINUED | OUTPATIENT
Start: 2024-09-14 | End: 2024-09-15 | Stop reason: HOSPADM

## 2024-09-14 RX ORDER — POLYETHYLENE GLYCOL 3350 17 G/17G
17 POWDER, FOR SOLUTION ORAL DAILY PRN
Status: DISCONTINUED | OUTPATIENT
Start: 2024-09-14 | End: 2024-09-15 | Stop reason: HOSPADM

## 2024-09-14 RX ORDER — ACETAMINOPHEN 650 MG/1
650 SUPPOSITORY RECTAL EVERY 6 HOURS PRN
Status: DISCONTINUED | OUTPATIENT
Start: 2024-09-14 | End: 2024-09-15 | Stop reason: HOSPADM

## 2024-09-14 RX ORDER — ONDANSETRON 4 MG/1
4 TABLET, ORALLY DISINTEGRATING ORAL EVERY 8 HOURS PRN
Status: DISCONTINUED | OUTPATIENT
Start: 2024-09-14 | End: 2024-09-15 | Stop reason: HOSPADM

## 2024-09-14 RX ORDER — ACETAMINOPHEN 325 MG/1
650 TABLET ORAL EVERY 6 HOURS PRN
Status: DISCONTINUED | OUTPATIENT
Start: 2024-09-14 | End: 2024-09-15 | Stop reason: HOSPADM

## 2024-09-14 RX ORDER — SODIUM CHLORIDE 0.9 % (FLUSH) 0.9 %
5-40 SYRINGE (ML) INJECTION EVERY 12 HOURS SCHEDULED
Status: DISCONTINUED | OUTPATIENT
Start: 2024-09-14 | End: 2024-09-15 | Stop reason: HOSPADM

## 2024-09-14 RX ORDER — POTASSIUM CHLORIDE 7.45 MG/ML
10 INJECTION INTRAVENOUS PRN
Status: DISCONTINUED | OUTPATIENT
Start: 2024-09-14 | End: 2024-09-15 | Stop reason: HOSPADM

## 2024-09-14 RX ORDER — POTASSIUM CHLORIDE 1500 MG/1
40 TABLET, EXTENDED RELEASE ORAL PRN
Status: DISCONTINUED | OUTPATIENT
Start: 2024-09-14 | End: 2024-09-15 | Stop reason: HOSPADM

## 2024-09-14 RX ORDER — SODIUM CHLORIDE 9 MG/ML
INJECTION, SOLUTION INTRAVENOUS PRN
Status: DISCONTINUED | OUTPATIENT
Start: 2024-09-14 | End: 2024-09-15 | Stop reason: HOSPADM

## 2024-09-14 RX ORDER — ENOXAPARIN SODIUM 100 MG/ML
40 INJECTION SUBCUTANEOUS DAILY
Status: DISCONTINUED | OUTPATIENT
Start: 2024-09-14 | End: 2024-09-15 | Stop reason: HOSPADM

## 2024-09-14 RX ORDER — LIDOCAINE 4 G/G
1 PATCH TOPICAL DAILY
Status: DISCONTINUED | OUTPATIENT
Start: 2024-09-14 | End: 2024-09-15 | Stop reason: HOSPADM

## 2024-09-14 RX ORDER — SODIUM CHLORIDE 0.9 % (FLUSH) 0.9 %
5-40 SYRINGE (ML) INJECTION PRN
Status: DISCONTINUED | OUTPATIENT
Start: 2024-09-14 | End: 2024-09-15 | Stop reason: HOSPADM

## 2024-09-14 RX ORDER — ONDANSETRON 2 MG/ML
4 INJECTION INTRAMUSCULAR; INTRAVENOUS EVERY 6 HOURS PRN
Status: DISCONTINUED | OUTPATIENT
Start: 2024-09-14 | End: 2024-09-15 | Stop reason: HOSPADM

## 2024-09-14 RX ORDER — TRAMADOL HYDROCHLORIDE 50 MG/1
50 TABLET ORAL EVERY 6 HOURS PRN
Status: DISCONTINUED | OUTPATIENT
Start: 2024-09-14 | End: 2024-09-15 | Stop reason: HOSPADM

## 2024-09-14 RX ADMIN — WATER 1000 MG: 1 INJECTION INTRAMUSCULAR; INTRAVENOUS; SUBCUTANEOUS at 00:05

## 2024-09-14 RX ADMIN — ENOXAPARIN SODIUM 40 MG: 100 INJECTION SUBCUTANEOUS at 10:19

## 2024-09-14 RX ADMIN — DICLOFENAC SODIUM 2 G: 10 GEL TOPICAL at 10:21

## 2024-09-14 RX ADMIN — SODIUM CHLORIDE, PRESERVATIVE FREE 10 ML: 5 INJECTION INTRAVENOUS at 21:13

## 2024-09-14 ASSESSMENT — PAIN SCALES - GENERAL
PAINLEVEL_OUTOF10: 2
PAINLEVEL_OUTOF10: 4
PAINLEVEL_OUTOF10: 7

## 2024-09-14 ASSESSMENT — PAIN DESCRIPTION - ORIENTATION: ORIENTATION: LOWER

## 2024-09-14 ASSESSMENT — PAIN DESCRIPTION - LOCATION: LOCATION: BACK

## 2024-09-14 NOTE — PROGRESS NOTES
[facility]    9/14/2024 7:42 AM        Name: Steffanie Chavez .              Admitted: 9/13/2024  Primary Care Provider: Kev Villafana DO (Tel: [phone])      Subjective:  .    Pt admitted with back pain    Had kyphoplasty 2 weeks ago as an outpatient 8/26/24 with Dr Conteh    Pt reported limited mobility at home  With recent UTI 's due to inability to get to the bathroom quickly ( had some stool urgency recently)      Reports pain with ambulation but does not want to take any meds, she is concerned about addiction   Had recent kyphoplasty      and son also in to visit     Reviewed interval ancillary notes    Current Medications  sodium chloride flush 0.9 % injection 5-40 mL, 2 times per day  sodium chloride flush 0.9 % injection 5-40 mL, PRN  0.9 % sodium chloride infusion, PRN  potassium chloride (KLOR-CON M) extended release tablet 40 mEq, PRN   Or  potassium bicarb-citric acid (EFFER-K) effervescent tablet 40 mEq, PRN   Or  potassium chloride 10 mEq/100 mL IVPB (Peripheral Line), PRN  magnesium sulfate 2000 mg in 50 mL IVPB premix, PRN  enoxaparin (LOVENOX) injection 40 mg, Daily  ondansetron (ZOFRAN-ODT) disintegrating tablet 4 mg, Q8H PRN   Or  ondansetron (ZOFRAN) injection 4 mg, Q6H PRN  polyethylene glycol (GLYCOLAX) packet 17 g, Daily PRN  acetaminophen (TYLENOL) tablet 650 mg, Q6H PRN   Or  acetaminophen (TYLENOL) suppository 650 mg, Q6H PRN  cefTRIAXone (ROCEPHIN) 1,000 mg in sterile water 10 mL IV syringe, Q24H        Objective:  BP (!) 156/79   Pulse 74   Temp 97.6 °F (36.4 °C) (Oral)   Resp 18   Ht 1.575 m (5' 2\")   Wt 74.8 kg (165 lb)   SpO2 95%   BMI 30.18 kg/m²   No intake or output data in the 24 hours ending 09/14/24 0742   Wt Readings from Last 3 Encounters:   09/14/24 74.8 kg (165 lb)   09/13/24 74.8 kg (165 lb)   08/26/24 77.1 kg (170 lb)       General appearance:  Appears comfortable, alert and very conversant   Eyes: Sclera clear.  hospital problems. *       Assessment & Plan:   Intractable back pain due to new compression fracture L1 , likely from recent kyphoplasty.  Lidoderm patch, voltaren and oxy IR or tramadol offered... pt currently does not want meds.    UTI:  on rocephin,  previous culture grew proteus that was pan sensitive , will follow closely  GERD: on PPI therapy   Chronic stable diastolic HF     I spent > 40 minutes in conversation with pt and her family at bedside.    Needs PT and OT evaluation    Pain medications encouraged/ pt resistant to all offered therapies.    Likely ready for d/c home in the next 24 hours pending urine culture etc.      Kyphoplasty is done on outpatient basis and will likely result in additional fractures       Diet: ADULT DIET; Regular  Code:Full Code  DVT PPX YVETTE Dooley - CNP   9/14/2024 7:42 AM

## 2024-09-14 NOTE — H&P
[facility] Medicine History & Physical      Patient Name: Steffanie Chavez    : 1940    PCP: Kev Villafana DO    Date of Service:  Patient seen and examined on 2024    Chief Complaint: Back pain    History Of Present Illness:    Steffanie Chavez is a 84 y.o. female with a PMH of hypertension, GERD, CAD, chronic diastolic CHF, DDD who presented to ED with complaint of back pain    Patient presents to the ED with complaints of worsening back pain.  Of note patient had a kyphoplasty of T4 and T12 vertebral body on .  Worsening of her pain over the last week in her lower back radiating to her right side of her abdomen.  No bladder or bowel incontinence or paresthesia or numbness in her lower extremities.    Vitals shows blood pressure 155/95 pulse of 81 temperature 98.1 respiration 20 saturating 96%.  BMP stable with sodium of 140 potassium 4.0 CRP of 3.6 LFTs stable WBC 9.5 hemoglobin 14.5.  Urinalysis concerning for UTI  CT lumbar spine shows new compression deformity of L1 with 30% vertebral body loss presence of interval kyphoplasty of T4 and T12.  In the ED patient received fentanyl 25 mcg, Zofran, ceftriaxone 1 g    Past Medical History:    Patient has a past medical history of Allergic rhinitis, Anesthesia, Arthropathy of cervical facet joint, CAD (coronary artery disease), Chronic diastolic CHF (congestive heart failure) (HCC), CLL (chronic lymphocytic leukemia) (HCC), DDD (degenerative disc disease), cervical, Diverticulosis, Fatty liver, Foraminal stenosis of cervical region, GERD (gastroesophageal reflux disease), Hepatomegaly, Hyperlipidemia, Hypertension, Impaired fasting glucose, Intractable back pain, Kidney stones, MI (myocardial infarction) (HCC), Movement disorder, Obesity, Osteopenia, Osteoporosis, Right carpal tunnel syndrome, Ulnar neuropathy at elbow of right upper extremity, and Urge incontinence.    Past Surgical History:    Patient has a past surgical history that  DDD who presented to ED with complaint of back pain    #Intractable back pain  Patient has a known history of degenerative disc disease  CT lumbar spine shows new compression deformity of L1 with 30% vertebral body loss presence of interval kyphoplasty of T4 and T12.  Received fentanyl IV in the ED  Plan-orthopedic surgery consult, as needed pain medication    #UTI  Urinalysis concerning for UTI  Patient received IV Rocephin    #History of CAD/chronic diastolic heart failure  #Hypertension  Resume home medication for above comorbid disease    #GERD  On PPI    DVT prophylaxis: Lovenox    Diet: Regular  Code Status: Full    Consults:  IP CONSULT TO HOSPITALIST  IP CONSULT TO ORTHOPEDIC SURGERY    Disposition:  Admit to Inpatient   ELOS:  Greater than two midnights due to medical therapy    Please note that portions of this note were completed with a voice recognition program.  Efforts were made to edit the dictations but occasionally words are mis-transcribed.)     Jerardo Kirkpatrick MD

## 2024-09-14 NOTE — PLAN OF CARE
Problem: Discharge Planning  Goal: Discharge to home or other facility with appropriate resources  Outcome: Progressing     Problem: Pain  Goal: Verbalizes/displays adequate comfort level or baseline comfort level  Outcome: Progressing     Problem: Safety - Adult  Goal: Free from fall injury  Outcome: Progressing     Problem: Musculoskeletal - Adult  Goal: Return mobility to safest level of function  Outcome: Progressing  Goal: Maintain proper alignment of affected body part  Outcome: Progressing  Goal: Return ADL status to a safe level of function  Outcome: Progressing     Problem: Genitourinary - Adult  Goal: Absence of urinary retention  Outcome: Progressing  Goal: Urinary catheter remains patent  Outcome: Progressing     Problem: Infection - Adult  Goal: Absence of infection at discharge  Outcome: Progressing  Goal: Absence of infection during hospitalization  Outcome: Progressing

## 2024-09-14 NOTE — PROGRESS NOTES
.4 Eyes Skin Assessment     NAME:  Steffanie Chavez  YOB: 1940  MEDICAL RECORD NUMBER:  [id number]    The patient is being assessed for  Admission    I agree that at least one RN has performed a thorough Head to Toe Skin Assessment on the patient. ALL assessment sites listed below have been assessed.      Areas assessed by both nurses:    Head, Face, Ears, Shoulders, Back, Chest, Arms, Elbows, Hands, Sacrum. Buttock, Coccyx, Ischium, and Legs. Feet and Heels        Does the Patient have a Wound? No noted wound(s)       Keon Prevention initiated by RN: Yes  Wound Care Orders initiated by RN: No    Pressure Injury (Stage 3,4, Unstageable, DTI, NWPT, and Complex wounds) if present, place Wound referral order by RN under : No    New Ostomies, if present place, Ostomy referral order under : No     Nurse 1 eSignature: Electronically signed by STEW CORDERO RN on 9/14/24 at 7:16 AM EDT    **SHARE this note so that the co-signing nurse can place an eSignature**    Nurse 2 eSignature: {Esignature:[id number]}

## 2024-09-14 NOTE — ED PROVIDER NOTES
EMERGENCY DEPARTMENT PROVIDER ATTESTATION NOTE      PATIENT IDENTIFICATION  Name:   Steffanie Chavez  MRN:   [id number]  YOB: 1940  Date of Evaluation:   9/13/2024  Provider:   SARAHY Cisneros; Simone Jaffe DO  PCP:   Kev Villafana DO        CHIEF COMPLAINT:   Back Pain (Patient complaining of back pain lower back; recent surgery on area 2 weeks ago. [facility] )      HPI  I independently interviewed patient and/or caretaker(s).  See Advanced Practice Provider (AYE) note for full HPI.  In summary, Steffanie Chavez  is a(n) 84 y.o. female who presents with back pain.  Patient had a recent kyphoplasty of T4 and T12.  Was seen in her primary care provider for worsening back pain today, and was sent here for further evaluation.  Discussed the pain on the the lower back radiating into the right abdomen.        PHYSICAL EXAM  I performed an independent physical exam and agree with findings in AYE note.  Elderly female with reducible tenderness palpation of the abdomen as well as the paraspinal musculature of the lumbar spine.            LABS  Results for orders placed or performed during the hospital encounter of 09/13/24   CBC with Auto Differential   Result Value Ref Range    WBC 9.5 4.0 - 11.0 K/uL    RBC 4.70 4.00 - 5.20 M/uL    Hemoglobin 14.5 12.0 - 16.0 g/dL    Hematocrit 42.7 36.0 - 48.0 %    MCV 90.9 80.0 - 100.0 fL    MCH 30.9 26.0 - 34.0 pg    MCHC 34.0 31.0 - 36.0 g/dL    RDW 13.1 12.4 - 15.4 %    Platelets 299 135 - 450 K/uL    MPV 7.4 5.0 - 10.5 fL    Neutrophils % 60.7 %    Lymphocytes % 32.0 %    Monocytes % 5.2 %    Eosinophils % 1.4 %    Basophils % 0.7 %    Neutrophils Absolute 5.8 1.7 - 7.7 K/uL    Lymphocytes Absolute 3.0 1.0 - 5.1 K/uL    Monocytes Absolute 0.5 0.0 - 1.3 K/uL    Eosinophils Absolute 0.1 0.0 - 0.6 K/uL    Basophils Absolute 0.1 0.0 - 0.2 K/uL   Basic Metabolic Panel   Result Value Ref Range    Sodium 140 136 - 145 mmol/L    Potassium 4.0 3.5 - 5.1 mmol/L  9.5 [PB]   2208 CT ABDOMEN PELVIS WO CONTRAST  New compression fracture of L1, 30% loss of height. [PB]   2208 Leukocyte Esterase, Urine(!): MODERATE [PB]   2208 Nitrite, Urine(!): POSITIVE [PB]   2208 Bacteria, UA(!): 4+  Will give antibiotics. [PB]   2220 Patient is visited at bedside.  Remains in stable condition.  States that her pain is adequately managed after receiving the fentanyl, about 2 hours prior.  However, she has significant concerns about the pain returning.  Had a long discussion with her regarding results and plan for outpatient management.  She expressed significant concern with her ability to perform her actives of daily living due to the pain.  Will attempt to ambulate patient then disposition accordingly. [PB]   2240 Nurse attempted to ambulate patient, but she is unable to.  States significant concern to perform her actives of daily living as her  is unreliable.  Will plan for admission. [PB]   2301 Order for consult inpatient hospitalist placed.  Message sent to inpatient team via [employer]. [PB]      ED Course User Index  [PB] Simone Jaffe DO   All charted times are approximate.    During the patient's ED course, the patient was given:  Medications   cefTRIAXone (ROCEPHIN) 1,000 mg in sterile water 10 mL IV syringe (has no administration in time range)   fentaNYL (SUBLIMAZE) injection 25 mcg (25 mcg IntraVENous Given 9/13/24 2008)   ondansetron (ZOFRAN) injection 4 mg (4 mg IntraVENous Given 9/13/24 2009)       Social determinants of health:   None applicable    Chronic conditions potentially affecting care:   hypertension, CHF, CAD, and hyperlipidemia        FINAL IMPRESSION  1. Intractable back pain    2. Closed compression fracture of body of L1 vertebra (HCC)    3. Acute cystitis without hematuria        Blood pressure (!) 155/95, pulse 81, temperature 98.1 °F (36.7 °C), temperature source Oral, resp. rate 20, height 1.575 m (5' 2\"), weight 74.8 kg (165 lb), SpO2

## 2024-09-15 VITALS
TEMPERATURE: 98 F | HEIGHT: 62 IN | OXYGEN SATURATION: 95 % | WEIGHT: 165 LBS | RESPIRATION RATE: 16 BRPM | HEART RATE: 80 BPM | DIASTOLIC BLOOD PRESSURE: 70 MMHG | BODY MASS INDEX: 30.36 KG/M2 | SYSTOLIC BLOOD PRESSURE: 125 MMHG

## 2024-09-15 LAB
GLUCOSE BLD-MCNC: 97 MG/DL (ref 70–99)
PERFORMED ON: NORMAL

## 2024-09-15 PROCEDURE — 97530 THERAPEUTIC ACTIVITIES: CPT

## 2024-09-15 PROCEDURE — 2580000003 HC RX 258: Performed by: STUDENT IN AN ORGANIZED HEALTH CARE EDUCATION/TRAINING PROGRAM

## 2024-09-15 PROCEDURE — 6370000000 HC RX 637 (ALT 250 FOR IP): Performed by: NURSE PRACTITIONER

## 2024-09-15 PROCEDURE — 97535 SELF CARE MNGMENT TRAINING: CPT

## 2024-09-15 PROCEDURE — 97161 PT EVAL LOW COMPLEX 20 MIN: CPT

## 2024-09-15 PROCEDURE — 97165 OT EVAL LOW COMPLEX 30 MIN: CPT

## 2024-09-15 PROCEDURE — 97116 GAIT TRAINING THERAPY: CPT

## 2024-09-15 PROCEDURE — 6360000002 HC RX W HCPCS: Performed by: STUDENT IN AN ORGANIZED HEALTH CARE EDUCATION/TRAINING PROGRAM

## 2024-09-15 RX ORDER — CETIRIZINE HYDROCHLORIDE 10 MG/1
5 TABLET ORAL DAILY
Status: DISCONTINUED | OUTPATIENT
Start: 2024-09-15 | End: 2024-09-15 | Stop reason: HOSPADM

## 2024-09-15 RX ORDER — CIPROFLOXACIN 250 MG/1
250 TABLET, FILM COATED ORAL 2 TIMES DAILY
Qty: 6 TABLET | Refills: 0 | Status: SHIPPED | OUTPATIENT
Start: 2024-09-15 | End: 2024-09-18

## 2024-09-15 RX ADMIN — TRAMADOL HYDROCHLORIDE 50 MG: 50 TABLET ORAL at 01:58

## 2024-09-15 RX ADMIN — WATER 1000 MG: 1 INJECTION INTRAMUSCULAR; INTRAVENOUS; SUBCUTANEOUS at 00:01

## 2024-09-15 ASSESSMENT — PAIN DESCRIPTION - DESCRIPTORS: DESCRIPTORS: ACHING

## 2024-09-15 ASSESSMENT — PAIN DESCRIPTION - ORIENTATION: ORIENTATION: LOWER;RIGHT

## 2024-09-15 ASSESSMENT — PAIN SCALES - GENERAL
PAINLEVEL_OUTOF10: 2
PAINLEVEL_OUTOF10: 6

## 2024-09-15 ASSESSMENT — PAIN DESCRIPTION - LOCATION: LOCATION: BACK

## 2024-09-15 NOTE — PROGRESS NOTES
[facility] - Inpatient Rehabilitation Department   Phone: [phone]    Occupational Therapy    [x] Initial Evaluation            [] Daily Treatment Note         [] Discharge Summary      Patient: Steffanie Chavez   : 1940   MRN: [id number]   Date of Service:  9/15/2024    Admitting Diagnosis:  Intractable back pain  Current Admission Summary: Steffanie Chavez is a 84 y.o. female with a history of hypertension, hyperlipidemia, CAD, MI, CHF, chronic lymphocytic leukemia, nonalcoholic fatty liver disease and recent C. difficile who presents to the emergency department today at the recommendation of her PCP secondary to worsening back pain. Patient had kyphoplasty of T4 and T12 vertebral body on 2024 with Dr. Conteh.  Patient states her back pain has never really improved since it began in  but does feel it has been worse over the last week. She states most of her pain is in the low back but radiates to the right side and even around to the right abdomen. She describes a sharp, constant, 2-10/10 pain. She denies changes in bowel or bladder habits, incontinence, saddle paresthesia, numbness, tingling or weakness in her extremities.  She denies having any nausea or vomiting. She states she has finished treatment for C. Difficile.  She states she did have some loose stool yesterday but it was nothing like the diarrhea she was having with C. difficile. She has no urinary complaints. She denies chest pain or shortness of breath.     New L1 compression fracture noted.   Past Medical History:  has a past medical history of Allergic rhinitis, Anesthesia, Arthropathy of cervical facet joint, CAD (coronary artery disease), Chronic diastolic CHF (congestive heart failure) (HCC), CLL (chronic lymphocytic leukemia) (HCC), DDD (degenerative disc disease), cervical, Diverticulosis, Fatty liver, Foraminal stenosis of cervical region, GERD (gastroesophageal reflux disease), Hepatomegaly, Hyperlipidemia,  errors made, assistance required to correct errors made  Insights: decreased awareness of deficits  Initiation: requires cues for some  Sequencing - requires cues for some   Orientation:    alert and oriented x 4  Command Following:   impaired     Education  Barriers To Learning: cognition  Patient Education: patient educated on goals, OT role and benefits, ADL adaptive strategies, disease specific education, discharge recommendations  Learning Assessment:  patient will require reinforcement due to cognitive deficits    Assessment  Activity Tolerance: good   Impairments Requiring Therapeutic Intervention: none - eval with same day discharge  Prognosis: fair  Clinical Assessment: Patient appears to be close to baseline function, SBA/min A for ADLs. Difficulty to redirect to task and limited receptivity to education. Appears safe to return home with intermittent assist of spouse. HHOT recommended.   Safety Interventions: patient left in bed, bed alarm in place, call light within reach, and nurse notified    Plan  Frequency: Eval with same day discharge.  No follow up required.  Current Treatment Recommendations: not applicable, evaluation completed with same day discharge.    Goals  Patient Goals: to return home    Short Term Goals:  Time Frame: na  Patient eval with same day discharge.  No goals set as patient is at baseline status.    Above goals reviewed on 9/15/2024.  All goals are ongoing at this time unless indicated above.       Therapy Session Time     Individual Group Co-treatment   Time In    0933   Time Out    1011   Minutes    38        Timed Code Treatment Minutes:   23  Total Treatment Minutes:  38       Electronically Signed By: Gen Vital OT

## 2024-09-15 NOTE — CARE COORDINATION
09/15/24 1211   IMM Letter   IMM Letter given to Patient/Family/Significant other/Guardian/POA/by: IMM Letter given to Patient by AMRIT. Patient signed IMM and is in agreement with D/C home today.   IMM Letter date given: 09/15/24   IMM Letter time given: 1115         Electronically signed by TUSHAR Villa on 9/15/2024 at 12:12 PM

## 2024-09-15 NOTE — DISCHARGE SUMMARY
[facility]    Patient Demographics    Patient. Steffanie Chavez  Date of Birth. 1940  MRN. [id number]     Primary care provider. Kev Villafana DO  (Tel: [phone])    Admit date: 9/13/2024    Discharge date 9/15/2024  Note Date: 9/15/2024     Reason for Hospitalization.   Chief Complaint   Patient presents with    Back Pain     Patient complaining of back pain lower back; recent surgery on area 2 weeks ago. [facility]          Significant Findings.   Principal Problem:    Intractable back pain  Resolved Problems:    * No resolved hospital problems. *       Problems and results from this hospitalization that need follow up.  Follow up with PCP  Pt instructed to NOT use PURE WICK at home.  Continue with ambulation etc.  NO NEED FOR HOSPITAL BED ( pt had requested a hospital bed for home use)   Therapy scores 20 - 21/ 24.  Offered home therapy and pt refused     Significant test results and incidental findings.  Urine culture with Citrobacter   rganism Citrobacter freundii Abnormal    Urine Culture, Routine >100,000 CFU/ml   Resulting Agency [facility]        Susceptibility    Citrobacter freundii (1)    Antibiotic Interpretation Microscan  Method Status    ceFAZolin Resistant >=64 mcg/mL BACTERIAL SUSCEPTIBILITY PANEL BY JED     cefepime Sensitive <=0.12 mcg/mL BACTERIAL SUSCEPTIBILITY PANEL BY JED     cefTRIAXone Resistant 32 mcg/mL BACTERIAL SUSCEPTIBILITY PANEL BY JED     ciprofloxacin Sensitive <=0.25 mcg/mL BACTERIAL SUSCEPTIBILITY PANEL BY JED     ertapenem Sensitive <=0.12 mcg/mL BACTERIAL SUSCEPTIBILITY PANEL BY JED     gentamicin Sensitive <=1 mcg/mL BACTERIAL SUSCEPTIBILITY PANEL BY JED     levofloxacin Sensitive <=0.12 mcg/mL BACTERIAL SUSCEPTIBILITY PANEL BY JED     nitrofurantoin Sensitive <=16 mcg/mL BACTERIAL SUSCEPTIBILITY PANEL BY JED    wheeze.  Gastrointestinal. Abdomen soft, non-tender, not distended, normal bowel sounds  Neurology. Facial symmetry. No speech deficits. Moving all extremities equally.  Extremities. No edema in lower extremities.  Skin. Warm, dry, normal turgor    Condition at time of discharge stable     Medication instructions provided to patient at discharge.     Medication List        START taking these medications      ciprofloxacin 250 MG tablet  Commonly known as: CIPRO  Take 1 tablet by mouth 2 times daily for 3 days            CONTINUE taking these medications      CHOLEST CARE PO     CoQ10 100 MG Caps     Cranberry 125 MG Tabs     * D-Mannose 500 MG Caps  Take 1 Capful by mouth in the morning and at bedtime     * D-Mannose 500 MG Caps  Take 1,000 mg by mouth in the morning and at bedtime     Glucosamine 500 MG Caps     K2 PLUS D3 PO     levocetirizine 5 MG tablet  Commonly known as: XYZAL     MULTIVITAMIN ADULT PO     NONFORMULARY     PROBIOTIC-10 PO     traMADol 50 MG tablet  Commonly known as: ULTRAM     turmeric 500 MG Caps           * This list has 2 medication(s) that are the same as other medications prescribed for you. Read the directions carefully, and ask your doctor or other care provider to review them with you.                ASK your doctor about these medications      amLODIPine 5 MG tablet  Commonly known as: NORVASC  Take 1 tablet by mouth daily     Ginkgo Biloba 40 MG Tabs     magnesium 30 MG tablet     pantoprazole 40 MG tablet  Commonly known as: PROTONIX  Take 1 tablet by mouth every morning (before breakfast)               Where to Get Your Medications        These medications were sent to [id number] - [place], [place] - [address] - P [phone] - F [phone]  [facility] [address]      Hours: 24-hours Phone: [phone]   ciprofloxacin 250 MG tablet         Discharge recommendations given to patient.  Follow Up.  in 1 week   Disposition.

## 2024-09-15 NOTE — CARE COORDINATION
Case Management Assessment            Discharge Note                    Date / Time of Note: 9/15/2024 12:13 PM                  Discharge Note Completed by: Ai Núñez RN    Patient Name: Steffanie Chavez   YOB: 1940  Diagnosis: Acute cystitis without hematuria [N30.00]  Intractable back pain [M54.9]  Closed compression fracture of body of L1 vertebra (HCC) [S32.010A]   Date / Time: 9/13/2024  5:13 PM    Current PCP: Kev Villafana DO    Advance Directives:  Code Status: Full Code    Financial:  Payor: MEDICARE / Plan: MEDICARE PART A AND B / Product Type: *No Product type* /      Pharmacy:    [id number] - [place], [place] - [address] - P [phone] - F [phone]  [address]  [facility] [address]  Phone: [phone] Fax: [phone]    [id number] - [place], [place] - [address] - P [phone] - F [phone] [address]  [facility] [address]  Phone: [phone] Fax: [phone]    ADLS:  Current PT AM-PAC Score: 20 /24  Current OT AM-PAC Score: 21 /24    DISCHARGE Disposition: Home- No Services Needed    IMM Completed:   Yes, Case management has presented and reviewed IMM letter #2 to the patient and/or family/ POA. Patient and/or family/POA verbalized understanding of their medicare rights and appeal process if needed. Patient and/or family/POA verbalized understanding of DC within 4 hours of signing IMM letter #2. Patient and/or family/POA has signed and placed today's date (9/15/2024) and time (1115) on IMM letter #2 on the the appropriate lines. Patient and/or family/POA, provided copy of letter and they are aware that original copy of IMM letter #2 is available prior to discharge from the paper chart on the unit.  Electronic documentation has been entered into epic for IMM letter #2 and original paper copy has been added to the paper chart at the nurses station.    IMM Letter given to

## 2024-09-15 NOTE — PLAN OF CARE
Problem: Discharge Planning  Goal: Discharge to home or other facility with appropriate resources  Outcome: Progressing     Problem: Pain  Goal: Verbalizes/displays adequate comfort level or baseline comfort level  Outcome: Progressing     Problem: Safety - Adult  Goal: Free from fall injury  Outcome: Progressing     Problem: Musculoskeletal - Adult  Goal: Return mobility to safest level of function  Outcome: Progressing  Goal: Maintain proper alignment of affected body part  Outcome: Progressing  Goal: Return ADL status to a safe level of function  Outcome: Progressing     Problem: Genitourinary - Adult  Goal: Absence of urinary retention  Outcome: Progressing  Goal: Urinary catheter remains patent  Outcome: Progressing     Problem: Infection - Adult  Goal: Absence of infection at discharge  Outcome: Progressing  Goal: Absence of infection during hospitalization  Outcome: Progressing     Problem: Chronic Conditions and Co-morbidities  Goal: Patient's chronic conditions and co-morbidity symptoms are monitored and maintained or improved  Outcome: Progressing

## 2024-09-15 NOTE — PROGRESS NOTES
2104: shift assessment done, VSS, refused medicine as she said she does not lashanda any at the moment, changed purewick as requested, call light within reach, plan of care ongoing. The care plan and education has been reviewed and mutually agreed upon with the patient.

## 2024-09-15 NOTE — PROGRESS NOTES
[facility] - Inpatient Rehabilitation Department   Phone: [phone]    Physical Therapy    [x] Initial Evaluation            [] Daily Treatment Note         [] Discharge Summary      Patient: Steffanie Chavez   : 1940   MRN: [id number]   Date of Service:  9/15/2024  Admitting Diagnosis: Intractable back pain  Current Admission Summary: Steffanie Chavez is a 84 y.o. female with a history of hypertension, hyperlipidemia, CAD, MI, CHF, chronic lymphocytic leukemia, nonalcoholic fatty liver disease and recent C. difficile who presents to the emergency department today at the recommendation of her PCP secondary to worsening back pain. Patient had kyphoplasty of T4 and T12 vertebral body on 2024 with Dr. Conteh.  Patient states her back pain has never really improved since it began in  but does feel it has been worse over the last week. She states most of her pain is in the low back but radiates to the right side and even around to the right abdomen. She describes a sharp, constant, 2-10/10 pain. She denies changes in bowel or bladder habits, incontinence, saddle paresthesia, numbness, tingling or weakness in her extremities.  She denies having any nausea or vomiting. She states she has finished treatment for C. Difficile.  She states she did have some loose stool yesterday but it was nothing like the diarrhea she was having with C. difficile. She has no urinary complaints. She denies chest pain or shortness of breath.      *Pt found to have new compression deformity at L1  Past Medical History:  has a past medical history of Allergic rhinitis, Anesthesia, Arthropathy of cervical facet joint, CAD (coronary artery disease), Chronic diastolic CHF (congestive heart failure) (HCC), CLL (chronic lymphocytic leukemia) (HCC), DDD (degenerative disc disease), cervical, Diverticulosis, Fatty liver, Foraminal stenosis of cervical region, GERD (gastroesophageal reflux disease), Hepatomegaly,  to evaluate if patient has Home Health Aide needs for personal care    DME Required For Discharge: no DME required at discharge  Precautions/Restrictions: high fall risk, up as tolerated, ambulate patient  Weight Bearing Restrictions: no restrictions    Required Braces/Orthotics: no braces required  Positional Restrictions:no positional restrictions    Pre-Admission Information   Lives With: spouse    Type of Home: house  Home Layout: two level- full bath on main level, plans to put a hospital bed on the main level. Pt has been going up the steps with her hands and LEs, and has to descend very slowly.  Home Access:  1 + 1 step to enter with handrail.  Handrails are located on B side.  Bathroom Layout: walk in shower  Bathroom Equipment: grab bars in shower, hand held shower head  Toilet Height: elevated height  Home Equipment: rollator - 4 wheeled walker, single point cane  Transfer Assistance: Independent without use of device  Ambulation Assistance:Independent without use of device- SPC on uneven surfaces only  ADL Assistance: independent with all ADL's  IADL Assistance: independent with homemaking tasks  Active :        [] Yes  [x] No  Recent Falls: Pt reports one fall in the past 6 months, hit head  Available Assistance at Discharge: unknown    Examination   Vision:   Vision Gross Assessment: Impaired and Vision Corrective Device: wears glasses for reading  Hearing:   WFL  Observation:   General Observation:  Pt on RA on arrival. Purewick in place.  Posture:   Mild forward head, rounded shoulders, and increased thoracic kyphosis in sitting and standing.  Sensation:   denies numbness and tingling  ROM:   (B) LE AROM WFL  Strength:   (B) LE strength grossly WFL  Therapist Clinical Decision Making (Complexity): low complexity  Clinical Presentation: stable      Subjective  General: Pt semi-fowlers in bed on arrival, agreeable to participate in PT initial evaluation. Pt is very talkative and tangential in

## 2024-09-15 NOTE — DISCHARGE INSTRUCTIONS
When using the bathroom wipe front to back. Walk every day. Do not stay in bed all day. Having a purewick can increase the chance to develop a UTI.

## 2024-09-16 ENCOUNTER — CARE COORDINATION (OUTPATIENT)
Dept: CASE MANAGEMENT | Age: 84
End: 2024-09-16

## 2024-09-16 LAB
BACTERIA UR CULT: ABNORMAL
ORGANISM: ABNORMAL

## 2024-09-17 ENCOUNTER — CARE COORDINATION (OUTPATIENT)
Dept: CASE MANAGEMENT | Age: 84
End: 2024-09-17

## 2024-09-17 ENCOUNTER — CARE COORDINATION (OUTPATIENT)
Dept: CARE COORDINATION | Age: 84
End: 2024-09-17

## 2024-09-18 DIAGNOSIS — S22.080D COMPRESSION FRACTURE OF T12 VERTEBRA WITH ROUTINE HEALING, SUBSEQUENT ENCOUNTER: ICD-10-CM

## 2024-09-18 DIAGNOSIS — S32.010A CLOSED COMPRESSION FRACTURE OF BODY OF L1 VERTEBRA (HCC): Primary | ICD-10-CM

## 2024-09-19 ENCOUNTER — CARE COORDINATION (OUTPATIENT)
Dept: CARE COORDINATION | Age: 84
End: 2024-09-19

## 2024-09-19 RX ORDER — TRAMADOL HYDROCHLORIDE 50 MG/1
50 TABLET ORAL EVERY 8 HOURS PRN
Qty: 90 TABLET | Refills: 0 | Status: SHIPPED | OUTPATIENT
Start: 2024-09-19 | End: 2024-10-19

## 2024-09-20 ENCOUNTER — TELEPHONE (OUTPATIENT)
Dept: INTERNAL MEDICINE CLINIC | Age: 84
End: 2024-09-20

## 2024-09-24 ENCOUNTER — HOSPITAL ENCOUNTER (OUTPATIENT)
Dept: GENERAL RADIOLOGY | Age: 84
Discharge: HOME | End: 2024-09-24
Attending: INTERNAL MEDICINE
Payer: MEDICARE

## 2024-09-24 DIAGNOSIS — M81.0 AGE-RELATED OSTEOPOROSIS WITHOUT CURRENT PATHOLOGICAL FRACTURE: ICD-10-CM

## 2024-09-24 PROCEDURE — 77080 DXA BONE DENSITY AXIAL: CPT

## 2024-10-01 ENCOUNTER — TELEPHONE (OUTPATIENT)
Dept: INTERNAL MEDICINE CLINIC | Age: 84
End: 2024-10-01

## 2024-10-01 NOTE — TELEPHONE ENCOUNTER
pt says she is leaving town for 6 months started and wants to know if md wants to se her before she leaves?

## 2024-10-02 NOTE — TELEPHONE ENCOUNTER
No need for another DEXA scan.  Medicare will cover 1 every 2 years.  The fact that she is not willing to treat the osteoporosis suggest that it makes no sense to get another scan.

## 2024-10-02 NOTE — TELEPHONE ENCOUNTER
ARGENIS   Called pt to get scheduled with Dr. Villafana. Pt stated she already had the dexa scan done. She was if she can get another one in 6 months. Pt did go ahead and reschedule her hospital f/u that she canceled on 10/2.

## 2024-10-08 ENCOUNTER — TELEPHONE (OUTPATIENT)
Dept: INTERNAL MEDICINE CLINIC | Age: 84
End: 2024-10-08

## 2024-10-08 NOTE — TELEPHONE ENCOUNTER
Pt decided to reschedule her appt with Dr. Villafana until next week due to having some bad diarrhea. Pt would like a recommendation or prescribed something if not that's okay. Pt also mentioned having a creamy discharge coming out of her vagina.

## 2024-10-08 NOTE — TELEPHONE ENCOUNTER
She can use Imodium for diarrhea.  With regards to the vaginal discharge I would recommend over-the-counter Monistat.  Finally, I would definitely recommend a gynecologist for an examination.

## 2024-10-09 NOTE — TELEPHONE ENCOUNTER
ARGENIS  Called pt back and advised per Dr. Villafana:    She can use Imodium for diarrhea.  With regards to the vaginal discharge I would recommend over-the-counter Monistat.  Finally, I would definitely recommend a gynecologist for an examination.        Pt stated she has been using the Imodium only has taken 1 pill, hasn't taken another yet. Pt is going to use coconut to use for her vagina, was recommended to her. Did take down a note regarding getting the monistat, pt had no discharge today. Also mentioned taking the cephalexin 500 mg and nothing today.

## 2024-10-16 ENCOUNTER — OFFICE VISIT (OUTPATIENT)
Dept: INTERNAL MEDICINE CLINIC | Age: 84
End: 2024-10-16

## 2024-10-16 VITALS
BODY MASS INDEX: 31.34 KG/M2 | SYSTOLIC BLOOD PRESSURE: 132 MMHG | OXYGEN SATURATION: 96 % | HEART RATE: 53 BPM | DIASTOLIC BLOOD PRESSURE: 76 MMHG | HEIGHT: 61 IN | WEIGHT: 166 LBS

## 2024-10-16 DIAGNOSIS — Z09 HOSPITAL DISCHARGE FOLLOW-UP: Primary | ICD-10-CM

## 2024-10-16 DIAGNOSIS — S32.010A CLOSED COMPRESSION FRACTURE OF BODY OF L1 VERTEBRA (HCC): ICD-10-CM

## 2024-10-16 DIAGNOSIS — H61.23 BILATERAL HEARING LOSS DUE TO CERUMEN IMPACTION: ICD-10-CM

## 2024-10-16 DIAGNOSIS — S22.080D COMPRESSION FRACTURE OF T12 VERTEBRA WITH ROUTINE HEALING, SUBSEQUENT ENCOUNTER: ICD-10-CM

## 2024-10-16 DIAGNOSIS — M80.00XA AGE-RELATED OSTEOPOROSIS WITH CURRENT PATHOLOGICAL FRACTURE, INITIAL ENCOUNTER: ICD-10-CM

## 2024-10-16 DIAGNOSIS — Z23 NEEDS FLU SHOT: ICD-10-CM

## 2024-10-16 DIAGNOSIS — M48.061 SPINAL STENOSIS OF LUMBAR REGION WITHOUT NEUROGENIC CLAUDICATION: ICD-10-CM

## 2024-10-16 DIAGNOSIS — Z98.890 S/P KYPHOPLASTY: ICD-10-CM

## 2024-10-16 NOTE — PROGRESS NOTES
[facility]  Internal Medicine  Patient Encounter  Kev Villafana D.O., FACOI        Post-Discharge Transitional Care  Follow Up      Steffanie Chavez   YOB: 1940    Date of Office Visit:  10/16/2024  Date of Hospital Admission: 9/13/24  Date of Hospital Discharge: 9/15/24  Risk of hospital readmission (high >=14%. Medium >=10%) :Readmission Risk Score: 13.2      Care management risk score Rising risk (score 2-5) and Complex Care (Scores >=6): No Risk Score On File     Non face to face  following discharge, date last encounter closed (first attempt may have been earlier): *No documented post hospital discharge outreach found in the last 14 days    Call initiated 2 business days of discharge: *No response recorded in the last 14 days    ASSESSMENT/PLAN:     1. Hospital discharge follow-up  Admitted from the office with intractable   - [place] DISCHARGE MEDS RECONCILED W/ CURRENT OUTPATIENT MED LIST    2. Closed compression fracture of body of L1 vertebra (HCC)  Overall, patient is doing much better.  Acute pain has resolved  She still has a low backache, but she is 95 to 99% better.  Patient will continue to use Tylenol for mild to moderate pain and tramadol for severe pain.  We reviewed the potential adverse side effects and habit-forming nature of tramadol.    3. Compression fracture of T12 vertebra with routine healing, subsequent encounter  Status post kyphoplasty    4. Spinal stenosis of lumbar region without neurogenic claudication  This was seen on MRI of her lumbar spine  Currently she is without radicular pain, bowel or bladder dysfunction  Pain is 95 to 99% better  Continue to monitor for symptoms    5. Bilateral hearing loss due to cerumen impaction    - REMOVAL IMPACTED CERUMEN IRRIGATION/LVG UNILAT    6. S/P kyphoplasty      7. Age-related osteoporosis with current pathological fracture, initial encounter  Patient declines medication for osteoporosis treatment  Recommended calcium

## 2024-10-17 ENCOUNTER — CARE COORDINATION (OUTPATIENT)
Dept: CARE COORDINATION | Age: 84
End: 2024-10-17

## 2024-10-17 NOTE — CARE COORDINATION
Ambulatory Care Coordination Note     10/17/2024 1:03 PM     Patient Current Location:  Home: [address] [place] [id number]     ACM contacted the patient by telephone. Verified name and  with patient as identifiers.         ACM: Sara Xie RN     Challenges to be reviewed by the provider   Additional needs identified to be addressed with provider No  none               Method of communication with provider: chart routing.    Has the patient been seen in the ED since your last call? no    Care Summary Note:   had appt w dr hooper yesterday  Dexa scan osteoporosis   Rec vit d/ calcium supp   She ordered online and received today  Does not want shingles  Dec 8 leaves for [place],through Feb      Offered patient enrollment in the Remote Patient Monitoring (RPM) program for in-home monitoring: Yes, but did not enroll at this time: already monitoring with home equipment.     Assessments Completed:   General Assessment    Do you have any symptoms that are causing concern?: No          Medications Reviewed:   Patient denies any changes with medications and reports taking all medications as prescribed.    Advance Care Planning:   Reviewed and current     Care Planning:   Education Documentation  No documentation found.  Education Comments  No comments found.     ,    Goals Addressed    None          PCP/Specialist follow up:   Future Appointments         Provider Specialty Dept Phone    2025 1:00 PM (Arrive by 12:45 PM) Kev Hooper, DO Internal Medicine [phone]            Follow Up:   Plan for next AC outreach in approximately 3 weeks to complete:  - disease specific assessments  - medication review   - education .   Patient  is agreeable to this plan.

## 2024-11-08 ENCOUNTER — CARE COORDINATION (OUTPATIENT)
Dept: CARE COORDINATION | Age: 84
End: 2024-11-08

## 2024-11-08 NOTE — CARE COORDINATION
Future Appointments         Provider Specialty Dept Phone    4/22/2025 1:00 PM (Arrive by 12:45 PM) Kev Villafana,  Internal Medicine [phone]            Follow Up:   No further Ambulatory Care Management follow-up scheduled at this time.  Patient  has Ambulatory Care Manager's contact information for any further questions, concerns or needs.

## 2025-05-21 ENCOUNTER — OFFICE VISIT (OUTPATIENT)
Dept: INTERNAL MEDICINE CLINIC | Age: 85
End: 2025-05-21

## 2025-05-21 VITALS
HEIGHT: 61 IN | SYSTOLIC BLOOD PRESSURE: 150 MMHG | HEART RATE: 95 BPM | BODY MASS INDEX: 31.34 KG/M2 | OXYGEN SATURATION: 94 % | WEIGHT: 166 LBS | TEMPERATURE: 97.5 F | DIASTOLIC BLOOD PRESSURE: 90 MMHG

## 2025-05-21 DIAGNOSIS — K76.0 NAFLD (NONALCOHOLIC FATTY LIVER DISEASE): ICD-10-CM

## 2025-05-21 DIAGNOSIS — R73.01 IMPAIRED FASTING BLOOD SUGAR: ICD-10-CM

## 2025-05-21 DIAGNOSIS — E78.2 MIXED HYPERLIPIDEMIA: ICD-10-CM

## 2025-05-21 DIAGNOSIS — C91.10 CLL (CHRONIC LYMPHOCYTIC LEUKEMIA) (HCC): ICD-10-CM

## 2025-05-21 DIAGNOSIS — I10 BENIGN ESSENTIAL HTN: Primary | ICD-10-CM

## 2025-05-21 DIAGNOSIS — I50.32 CHRONIC DIASTOLIC CHF (CONGESTIVE HEART FAILURE) (HCC): ICD-10-CM

## 2025-05-21 DIAGNOSIS — Z78.9 STATIN INTOLERANCE: ICD-10-CM

## 2025-05-21 DIAGNOSIS — M81.0 AGE-RELATED OSTEOPOROSIS WITHOUT CURRENT PATHOLOGICAL FRACTURE: ICD-10-CM

## 2025-05-21 DIAGNOSIS — I25.10 CORONARY ARTERY DISEASE INVOLVING NATIVE CORONARY ARTERY OF NATIVE HEART WITHOUT ANGINA PECTORIS: ICD-10-CM

## 2025-05-21 DIAGNOSIS — I65.23 CAROTID STENOSIS, ASYMPTOMATIC, BILATERAL: ICD-10-CM

## 2025-05-21 DIAGNOSIS — I10 BENIGN ESSENTIAL HTN: ICD-10-CM

## 2025-05-21 PROBLEM — Z98.890 S/P KYPHOPLASTY: Status: RESOLVED | Noted: 2024-10-16 | Resolved: 2025-05-21

## 2025-05-21 PROBLEM — K92.2 GI BLEED: Status: RESOLVED | Noted: 2024-07-09 | Resolved: 2025-05-21

## 2025-05-21 PROBLEM — M54.9 INTRACTABLE BACK PAIN: Status: RESOLVED | Noted: 2024-09-13 | Resolved: 2025-05-21

## 2025-05-21 PROBLEM — A04.72 C. DIFFICILE COLITIS: Status: RESOLVED | Noted: 2024-07-10 | Resolved: 2025-05-21

## 2025-05-21 PROBLEM — E87.20 LACTIC ACIDOSIS: Status: RESOLVED | Noted: 2024-07-09 | Resolved: 2025-05-21

## 2025-05-21 PROBLEM — S32.010A CLOSED COMPRESSION FRACTURE OF BODY OF L1 VERTEBRA (HCC): Status: RESOLVED | Noted: 2024-10-16 | Resolved: 2025-05-21

## 2025-05-21 RX ORDER — AMLODIPINE BESYLATE 5 MG/1
5 TABLET ORAL DAILY
Qty: 90 TABLET | Refills: 1 | Status: SHIPPED | OUTPATIENT
Start: 2025-05-21

## 2025-05-21 SDOH — ECONOMIC STABILITY: FOOD INSECURITY: WITHIN THE PAST 12 MONTHS, YOU WORRIED THAT YOUR FOOD WOULD RUN OUT BEFORE YOU GOT MONEY TO BUY MORE.: NEVER TRUE

## 2025-05-21 SDOH — ECONOMIC STABILITY: FOOD INSECURITY: WITHIN THE PAST 12 MONTHS, THE FOOD YOU BOUGHT JUST DIDN'T LAST AND YOU DIDN'T HAVE MONEY TO GET MORE.: NEVER TRUE

## 2025-05-21 ASSESSMENT — PATIENT HEALTH QUESTIONNAIRE - PHQ9
SUM OF ALL RESPONSES TO PHQ QUESTIONS 1-9: 0
SUM OF ALL RESPONSES TO PHQ QUESTIONS 1-9: 0
1. LITTLE INTEREST OR PLEASURE IN DOING THINGS: NOT AT ALL
SUM OF ALL RESPONSES TO PHQ QUESTIONS 1-9: 0
2. FEELING DOWN, DEPRESSED OR HOPELESS: NOT AT ALL
SUM OF ALL RESPONSES TO PHQ QUESTIONS 1-9: 0

## 2025-05-21 NOTE — PROGRESS NOTES
[facility]  Internal Medicine  Patient Encounter  Kev Villafana D.O., [facility]        Chief Complaint   Patient presents with    Check-Up     6 mo       HPI: 85 y.o. female who has been noncompliant with routine follow-up presents today requesting a checkup regarding the status of current issues listed below along with a medication review and reconciliation.     She is on D-Mannose/Cranberry.  She feels this is working well preventing her UTI's.      CLL/Small lymphocytic lymphoma--several years ago patient was found to have smudge cells on her peripheral smear during routine lab.  She was subsequently diagnosed with CLL.  She is under the care of Dr. Jones.  She denies swollen lymph nodes.   She denies B symptoms.  Denies any weight loss.    HTN--Patient denies any problems with headaches, dizziness, lightheadedness, syncope.  Patient has been resistant to taking blood pressure medication.  She has been resistant to adjusting doses or adding medications. She stopped her Amlodipine.  She expresses her understanding about increased risk for stroke.      IFG--Patient denies any problems with excessive thirst or frequent urination.  She denies weight changes.    Lab Results   Component Value Date    LABA1C 5.2 05/29/2024     Lab Results   Component Value Date    .5 05/29/2024      Hyperlipidemia:   Lab Results   Component Value Date    LDL 59 05/29/2024    LDLDIRECT 116 (H) 12/11/2019   Patient has spent many years refusing statin therapy.      CAD/diastolic congestive heart failure-- Previous history---->  She has H/O PTCA with three stents 7/30/2012 after suffering an acute MI.    Interval history----> her last stress Myoview was 11/13/2023.  This was negative.  She denies CP, SOB, palpitations, orthopnea, swelling.      Carotid stenosis-- Her last carotid doppler 8/25/2023.  She is asymptomatic.  She is not on ASA.      Elevated LFT-- She has slightly elevated, LFT's over the years.   She has

## 2025-05-22 LAB
25(OH)D3 SERPL-MCNC: 28.8 NG/ML
ALBUMIN SERPL-MCNC: 4.3 G/DL (ref 3.4–5)
ALBUMIN/GLOB SERPL: 1.7 {RATIO} (ref 1.1–2.2)
ALP SERPL-CCNC: 103 U/L (ref 40–129)
ALT SERPL-CCNC: 24 U/L (ref 10–40)
ANION GAP SERPL CALCULATED.3IONS-SCNC: 13 MMOL/L (ref 3–16)
AST SERPL-CCNC: 28 U/L (ref 15–37)
BASOPHILS # BLD: 0 K/UL (ref 0–0.2)
BASOPHILS NFR BLD: 0 %
BILIRUB SERPL-MCNC: 0.6 MG/DL (ref 0–1)
BUN SERPL-MCNC: 19 MG/DL (ref 7–20)
CALCIUM SERPL-MCNC: 9.6 MG/DL (ref 8.3–10.6)
CHLORIDE SERPL-SCNC: 103 MMOL/L (ref 99–110)
CHOLEST SERPL-MCNC: 201 MG/DL (ref 0–199)
CO2 SERPL-SCNC: 23 MMOL/L (ref 21–32)
CREAT SERPL-MCNC: 0.6 MG/DL (ref 0.6–1.2)
DEPRECATED RDW RBC AUTO: 14.2 % (ref 12.4–15.4)
EOSINOPHIL # BLD: 0.1 K/UL (ref 0–0.6)
EOSINOPHIL NFR BLD: 1 %
EST. AVERAGE GLUCOSE BLD GHB EST-MCNC: 93.9 MG/DL
GFR SERPLBLD CREATININE-BSD FMLA CKD-EPI: 88 ML/MIN/{1.73_M2}
GLUCOSE SERPL-MCNC: 90 MG/DL (ref 70–99)
HBA1C MFR BLD: 4.9 %
HCT VFR BLD AUTO: 45.9 % (ref 36–48)
HDLC SERPL-MCNC: 47 MG/DL (ref 40–60)
HGB BLD-MCNC: 15.7 G/DL (ref 12–16)
LDLC SERPL CALC-MCNC: 113 MG/DL
LYMPHOCYTES # BLD: 4 K/UL (ref 1–5.1)
LYMPHOCYTES NFR BLD: 37 %
MCH RBC QN AUTO: 31 PG (ref 26–34)
MCHC RBC AUTO-ENTMCNC: 34.2 G/DL (ref 31–36)
MCV RBC AUTO: 90.5 FL (ref 80–100)
MONOCYTES # BLD: 0.5 K/UL (ref 0–1.3)
MONOCYTES NFR BLD: 5 %
NEUTROPHILS # BLD: 6.2 K/UL (ref 1.7–7.7)
NEUTROPHILS NFR BLD: 57 %
PLATELET # BLD AUTO: 256 K/UL (ref 135–450)
PLATELET BLD QL SMEAR: ADEQUATE
PMV BLD AUTO: 7.9 FL (ref 5–10.5)
POTASSIUM SERPL-SCNC: 4 MMOL/L (ref 3.5–5.1)
PROT SERPL-MCNC: 6.8 G/DL (ref 6.4–8.2)
RBC # BLD AUTO: 5.07 M/UL (ref 4–5.2)
RBC MORPH BLD: NORMAL
SLIDE REVIEW: NORMAL
SODIUM SERPL-SCNC: 139 MMOL/L (ref 136–145)
TRIGL SERPL-MCNC: 205 MG/DL (ref 0–150)
TSH SERPL DL<=0.005 MIU/L-ACNC: 2.37 UIU/ML (ref 0.27–4.2)
VLDLC SERPL CALC-MCNC: 41 MG/DL
WBC # BLD AUTO: 10.9 K/UL (ref 4–11)

## 2025-05-23 ENCOUNTER — RESULTS FOLLOW-UP (OUTPATIENT)
Dept: INTERNAL MEDICINE CLINIC | Age: 85
End: 2025-05-23

## 2025-09-02 ENCOUNTER — APPOINTMENT (OUTPATIENT)
Dept: GENERAL RADIOLOGY | Age: 85
End: 2025-09-02
Payer: MEDICARE

## 2025-09-02 ENCOUNTER — APPOINTMENT (OUTPATIENT)
Dept: CT IMAGING | Age: 85
End: 2025-09-02
Payer: MEDICARE

## 2025-09-02 ENCOUNTER — HOSPITAL ENCOUNTER (INPATIENT)
Age: 85
LOS: 1 days | Discharge: OTHER INSTITUTION | End: 2025-09-04
Attending: INTERNAL MEDICINE | Admitting: STUDENT IN AN ORGANIZED HEALTH CARE EDUCATION/TRAINING PROGRAM
Payer: MEDICARE

## 2025-09-02 DIAGNOSIS — I24.9 ACUTE CORONARY SYNDROME (HCC): ICD-10-CM

## 2025-09-02 DIAGNOSIS — I63.432 CEREBROVASCULAR ACCIDENT (CVA) DUE TO EMBOLISM OF LEFT POSTERIOR CEREBRAL ARTERY (HCC): ICD-10-CM

## 2025-09-02 DIAGNOSIS — I21.4 NSTEMI (NON-ST ELEVATED MYOCARDIAL INFARCTION) (HCC): ICD-10-CM

## 2025-09-02 DIAGNOSIS — I63.9 CEREBROVASCULAR ACCIDENT (CVA), UNSPECIFIED MECHANISM (HCC): Primary | ICD-10-CM

## 2025-09-02 LAB
ALBUMIN SERPL-MCNC: 3.9 G/DL (ref 3.4–5)
ALBUMIN/GLOB SERPL: 1.3 {RATIO} (ref 1.1–2.2)
ALP SERPL-CCNC: 75 U/L (ref 40–129)
ALT SERPL-CCNC: 12 U/L (ref 10–40)
ANION GAP SERPL CALCULATED.3IONS-SCNC: 12 MMOL/L (ref 3–16)
AST SERPL-CCNC: 30 U/L (ref 15–37)
BASOPHILS # BLD: 0.1 K/UL (ref 0–0.2)
BASOPHILS NFR BLD: 0.5 %
BILIRUB SERPL-MCNC: 0.9 MG/DL (ref 0–1)
BUN SERPL-MCNC: 26 MG/DL (ref 7–20)
CALCIUM SERPL-MCNC: 9.5 MG/DL (ref 8.3–10.6)
CHLORIDE SERPL-SCNC: 107 MMOL/L (ref 99–110)
CO2 SERPL-SCNC: 20 MMOL/L (ref 21–32)
CREAT SERPL-MCNC: 1 MG/DL (ref 0.6–1.2)
DEPRECATED RDW RBC AUTO: 13.5 % (ref 12.4–15.4)
EOSINOPHIL # BLD: 0.1 K/UL (ref 0–0.6)
EOSINOPHIL NFR BLD: 1.1 %
GFR SERPLBLD CREATININE-BSD FMLA CKD-EPI: 55 ML/MIN/{1.73_M2}
GLUCOSE SERPL-MCNC: 96 MG/DL (ref 70–99)
HCT VFR BLD AUTO: 42.9 % (ref 36–48)
HGB BLD-MCNC: 14.4 G/DL (ref 12–16)
LYMPHOCYTES # BLD: 4 K/UL (ref 1–5.1)
LYMPHOCYTES NFR BLD: 33.5 %
MCH RBC QN AUTO: 30.8 PG (ref 26–34)
MCHC RBC AUTO-ENTMCNC: 33.6 G/DL (ref 31–36)
MCV RBC AUTO: 91.7 FL (ref 80–100)
MONOCYTES # BLD: 0.7 K/UL (ref 0–1.3)
MONOCYTES NFR BLD: 5.5 %
NEUTROPHILS # BLD: 7 K/UL (ref 1.7–7.7)
NEUTROPHILS NFR BLD: 59.4 %
PLATELET # BLD AUTO: 246 K/UL (ref 135–450)
PMV BLD AUTO: 6.7 FL (ref 5–10.5)
POTASSIUM SERPL-SCNC: 3.6 MMOL/L (ref 3.5–5.1)
PROT SERPL-MCNC: 6.9 G/DL (ref 6.4–8.2)
RBC # BLD AUTO: 4.68 M/UL (ref 4–5.2)
SODIUM SERPL-SCNC: 139 MMOL/L (ref 136–145)
TROPONIN, HIGH SENSITIVITY: 153 NG/L (ref 0–14)
TROPONIN, HIGH SENSITIVITY: 232 NG/L (ref 0–14)
WBC # BLD AUTO: 11.8 K/UL (ref 4–11)

## 2025-09-02 PROCEDURE — G0378 HOSPITAL OBSERVATION PER HR: HCPCS

## 2025-09-02 PROCEDURE — 80053 COMPREHEN METABOLIC PANEL: CPT

## 2025-09-02 PROCEDURE — 99285 EMERGENCY DEPT VISIT HI MDM: CPT

## 2025-09-02 PROCEDURE — 93005 ELECTROCARDIOGRAM TRACING: CPT | Performed by: NURSE PRACTITIONER

## 2025-09-02 PROCEDURE — 70450 CT HEAD/BRAIN W/O DYE: CPT

## 2025-09-02 PROCEDURE — 85025 COMPLETE CBC W/AUTO DIFF WBC: CPT

## 2025-09-02 PROCEDURE — 71045 X-RAY EXAM CHEST 1 VIEW: CPT

## 2025-09-02 PROCEDURE — 6370000000 HC RX 637 (ALT 250 FOR IP): Performed by: INTERNAL MEDICINE

## 2025-09-02 PROCEDURE — 84484 ASSAY OF TROPONIN QUANT: CPT

## 2025-09-02 PROCEDURE — 93005 ELECTROCARDIOGRAM TRACING: CPT | Performed by: INTERNAL MEDICINE

## 2025-09-02 PROCEDURE — 2500000003 HC RX 250 WO HCPCS: Performed by: INTERNAL MEDICINE

## 2025-09-02 RX ORDER — ONDANSETRON 4 MG/1
4 TABLET, ORALLY DISINTEGRATING ORAL EVERY 8 HOURS PRN
Status: DISCONTINUED | OUTPATIENT
Start: 2025-09-02 | End: 2025-09-04 | Stop reason: HOSPADM

## 2025-09-02 RX ORDER — POLYETHYLENE GLYCOL 3350 17 G/17G
17 POWDER, FOR SOLUTION ORAL DAILY PRN
Status: DISCONTINUED | OUTPATIENT
Start: 2025-09-02 | End: 2025-09-04 | Stop reason: HOSPADM

## 2025-09-02 RX ORDER — SODIUM CHLORIDE 9 MG/ML
INJECTION, SOLUTION INTRAVENOUS PRN
Status: DISCONTINUED | OUTPATIENT
Start: 2025-09-02 | End: 2025-09-04 | Stop reason: HOSPADM

## 2025-09-02 RX ORDER — ACETAMINOPHEN 325 MG/1
650 TABLET ORAL EVERY 4 HOURS PRN
Status: DISCONTINUED | OUTPATIENT
Start: 2025-09-02 | End: 2025-09-04 | Stop reason: HOSPADM

## 2025-09-02 RX ORDER — NITROGLYCERIN 0.4 MG/1
0.4 TABLET SUBLINGUAL PRN
Status: DISCONTINUED | OUTPATIENT
Start: 2025-09-02 | End: 2025-09-04 | Stop reason: HOSPADM

## 2025-09-02 RX ORDER — ASPIRIN 300 MG/1
300 SUPPOSITORY RECTAL DAILY
Status: DISCONTINUED | OUTPATIENT
Start: 2025-09-02 | End: 2025-09-04 | Stop reason: HOSPADM

## 2025-09-02 RX ORDER — ENOXAPARIN SODIUM 100 MG/ML
40 INJECTION SUBCUTANEOUS DAILY
Status: DISCONTINUED | OUTPATIENT
Start: 2025-09-03 | End: 2025-09-04 | Stop reason: HOSPADM

## 2025-09-02 RX ORDER — SODIUM CHLORIDE 0.9 % (FLUSH) 0.9 %
5-40 SYRINGE (ML) INJECTION PRN
Status: DISCONTINUED | OUTPATIENT
Start: 2025-09-02 | End: 2025-09-04 | Stop reason: HOSPADM

## 2025-09-02 RX ORDER — ASPIRIN 81 MG/1
81 TABLET, CHEWABLE ORAL DAILY
Status: DISCONTINUED | OUTPATIENT
Start: 2025-09-02 | End: 2025-09-04 | Stop reason: HOSPADM

## 2025-09-02 RX ORDER — LABETALOL HYDROCHLORIDE 5 MG/ML
10 INJECTION, SOLUTION INTRAVENOUS EVERY 4 HOURS PRN
Status: DISCONTINUED | OUTPATIENT
Start: 2025-09-02 | End: 2025-09-04 | Stop reason: HOSPADM

## 2025-09-02 RX ORDER — ATORVASTATIN CALCIUM 40 MG/1
40 TABLET, FILM COATED ORAL NIGHTLY
Status: DISCONTINUED | OUTPATIENT
Start: 2025-09-02 | End: 2025-09-04 | Stop reason: HOSPADM

## 2025-09-02 RX ORDER — ONDANSETRON 2 MG/ML
4 INJECTION INTRAMUSCULAR; INTRAVENOUS EVERY 6 HOURS PRN
Status: DISCONTINUED | OUTPATIENT
Start: 2025-09-02 | End: 2025-09-04 | Stop reason: HOSPADM

## 2025-09-02 RX ORDER — ACETAMINOPHEN 650 MG/1
650 SUPPOSITORY RECTAL EVERY 4 HOURS PRN
Status: DISCONTINUED | OUTPATIENT
Start: 2025-09-02 | End: 2025-09-04 | Stop reason: HOSPADM

## 2025-09-02 RX ORDER — SODIUM CHLORIDE 0.9 % (FLUSH) 0.9 %
5-40 SYRINGE (ML) INJECTION EVERY 12 HOURS SCHEDULED
Status: DISCONTINUED | OUTPATIENT
Start: 2025-09-02 | End: 2025-09-04 | Stop reason: HOSPADM

## 2025-09-02 RX ADMIN — SODIUM CHLORIDE, PRESERVATIVE FREE 10 ML: 5 INJECTION INTRAVENOUS at 21:37

## 2025-09-02 RX ADMIN — ATORVASTATIN CALCIUM 40 MG: 40 TABLET, FILM COATED ORAL at 21:37

## 2025-09-02 RX ADMIN — ASPIRIN 81 MG: 81 TABLET, CHEWABLE ORAL at 21:37

## 2025-09-02 ASSESSMENT — PAIN SCALES - GENERAL
PAINLEVEL_OUTOF10: 4
PAINLEVEL_OUTOF10: 0

## 2025-09-03 ENCOUNTER — APPOINTMENT (OUTPATIENT)
Age: 85
End: 2025-09-03
Attending: INTERNAL MEDICINE
Payer: MEDICARE

## 2025-09-03 ENCOUNTER — APPOINTMENT (OUTPATIENT)
Dept: MRI IMAGING | Age: 85
End: 2025-09-03
Payer: MEDICARE

## 2025-09-03 ENCOUNTER — APPOINTMENT (OUTPATIENT)
Dept: VASCULAR LAB | Age: 85
End: 2025-09-03
Attending: INTERNAL MEDICINE
Payer: MEDICARE

## 2025-09-03 PROBLEM — I24.9 ACUTE CORONARY SYNDROME (HCC): Status: ACTIVE | Noted: 2025-09-03

## 2025-09-03 PROBLEM — I21.4 NSTEMI (NON-ST ELEVATED MYOCARDIAL INFARCTION) (HCC): Status: ACTIVE | Noted: 2025-09-03

## 2025-09-03 LAB
ALBUMIN SERPL-MCNC: 3.5 G/DL (ref 3.4–5)
ALBUMIN/GLOB SERPL: 1.3 {RATIO} (ref 1.1–2.2)
ALP SERPL-CCNC: 71 U/L (ref 40–129)
ALT SERPL-CCNC: 12 U/L (ref 10–40)
ANION GAP SERPL CALCULATED.3IONS-SCNC: 11 MMOL/L (ref 3–16)
AST SERPL-CCNC: 31 U/L (ref 15–37)
BACTERIA URNS QL MICRO: ABNORMAL /HPF
BILIRUB SERPL-MCNC: 0.8 MG/DL (ref 0–1)
BILIRUB UR QL STRIP.AUTO: NEGATIVE
BUN SERPL-MCNC: 24 MG/DL (ref 7–20)
CALCIUM SERPL-MCNC: 9.2 MG/DL (ref 8.3–10.6)
CHLORIDE SERPL-SCNC: 106 MMOL/L (ref 99–110)
CHOLEST SERPL-MCNC: 140 MG/DL (ref 0–199)
CLARITY UR: CLEAR
CO2 SERPL-SCNC: 23 MMOL/L (ref 21–32)
COLOR UR: YELLOW
CREAT SERPL-MCNC: 0.9 MG/DL (ref 0.6–1.2)
DEPRECATED RDW RBC AUTO: 13.8 % (ref 12.4–15.4)
ECHO AO ROOT DIAM: 2.8 CM
ECHO AO ROOT INDEX: 1.6 CM/M2
ECHO AV AREA PEAK VELOCITY: 3.1 CM2
ECHO AV AREA VTI: 2.7 CM2
ECHO AV AREA/BSA PEAK VELOCITY: 1.8 CM2/M2
ECHO AV AREA/BSA VTI: 1.5 CM2/M2
ECHO AV MEAN GRADIENT: 4 MMHG
ECHO AV MEAN VELOCITY: 1 M/S
ECHO AV PEAK GRADIENT: 6 MMHG
ECHO AV PEAK VELOCITY: 1.3 M/S
ECHO AV VELOCITY RATIO: 0.77
ECHO AV VTI: 28.6 CM
ECHO BSA: 1.8 M2
ECHO BSA: 1.8 M2
ECHO EST RA PRESSURE: 3 MMHG
ECHO LA AREA 2C: 13.3 CM2
ECHO LA AREA 4C: 10.7 CM2
ECHO LA DIAMETER INDEX: 1.89 CM/M2
ECHO LA DIAMETER: 3.3 CM
ECHO LA MAJOR AXIS: 4 CM
ECHO LA MINOR AXIS: 4.5 CM
ECHO LA TO AORTIC ROOT RATIO: 1.18
ECHO LA VOL BP: 29 ML (ref 22–52)
ECHO LA VOL MOD A2C: 34 ML (ref 22–52)
ECHO LA VOL MOD A4C: 23 ML (ref 22–52)
ECHO LA VOL/BSA BIPLANE: 17 ML/M2 (ref 16–34)
ECHO LA VOLUME INDEX MOD A2C: 19 ML/M2 (ref 16–34)
ECHO LA VOLUME INDEX MOD A4C: 13 ML/M2 (ref 16–34)
ECHO LV E' LATERAL VELOCITY: 4.7 CM/S
ECHO LV E' SEPTAL VELOCITY: 4.57 CM/S
ECHO LV EDV A2C: 105 ML
ECHO LV EDV A4C: 98 ML
ECHO LV EDV INDEX A4C: 56 ML/M2
ECHO LV EDV NDEX A2C: 60 ML/M2
ECHO LV EF PHYSICIAN: 50 %
ECHO LV EJECTION FRACTION A2C: 55 %
ECHO LV EJECTION FRACTION A4C: 48 %
ECHO LV EJECTION FRACTION BIPLANE: 52 % (ref 55–100)
ECHO LV ESV A2C: 47 ML
ECHO LV ESV A4C: 52 ML
ECHO LV ESV INDEX A2C: 27 ML/M2
ECHO LV ESV INDEX A4C: 30 ML/M2
ECHO LV FRACTIONAL SHORTENING: 26 % (ref 28–44)
ECHO LV INTERNAL DIMENSION DIASTOLE INDEX: 2.4 CM/M2
ECHO LV INTERNAL DIMENSION DIASTOLIC: 4.2 CM (ref 3.9–5.3)
ECHO LV INTERNAL DIMENSION SYSTOLIC INDEX: 1.77 CM/M2
ECHO LV INTERNAL DIMENSION SYSTOLIC: 3.1 CM
ECHO LV IVSD: 1.1 CM (ref 0.6–0.9)
ECHO LV MASS 2D: 157.1 G (ref 67–162)
ECHO LV MASS INDEX 2D: 89.8 G/M2 (ref 43–95)
ECHO LV POSTERIOR WALL DIASTOLIC: 1.1 CM (ref 0.6–0.9)
ECHO LV RELATIVE WALL THICKNESS RATIO: 0.52
ECHO LVOT AREA: 3.8 CM2
ECHO LVOT AV VTI INDEX: 0.71
ECHO LVOT DIAM: 2.2 CM
ECHO LVOT MEAN GRADIENT: 2 MMHG
ECHO LVOT PEAK GRADIENT: 4 MMHG
ECHO LVOT PEAK VELOCITY: 1 M/S
ECHO LVOT STROKE VOLUME INDEX: 44.3 ML/M2
ECHO LVOT SV: 77.5 ML
ECHO LVOT VTI: 20.4 CM
ECHO MV A VELOCITY: 1.04 M/S
ECHO MV E VELOCITY: 0.5 M/S
ECHO MV E/A RATIO: 0.48
ECHO MV E/E' LATERAL: 10.64
ECHO MV E/E' RATIO (AVERAGED): 10.79
ECHO MV E/E' SEPTAL: 10.94
ECHO RA AREA 4C: 8.1 CM2
ECHO RA END SYSTOLIC VOLUME APICAL 4 CHAMBER INDEX BSA: 6 ML/M2
ECHO RA VOLUME: 11 ML
ECHO RV BASAL DIMENSION: 2.7 CM
ECHO RV FREE WALL PEAK S': 14.7 CM/S
ECHO RV LONGITUDINAL DIMENSION: 7.5 CM
ECHO RV MID DIMENSION: 2.4 CM
ECHO RV TAPSE: 2 CM (ref 1.7–?)
EKG ATRIAL RATE: 68 BPM
EKG ATRIAL RATE: 83 BPM
EKG DIAGNOSIS: NORMAL
EKG DIAGNOSIS: NORMAL
EKG P AXIS: 27 DEGREES
EKG P AXIS: 72 DEGREES
EKG P-R INTERVAL: 238 MS
EKG P-R INTERVAL: 270 MS
EKG Q-T INTERVAL: 336 MS
EKG Q-T INTERVAL: 360 MS
EKG QRS DURATION: 80 MS
EKG QRS DURATION: 88 MS
EKG QTC CALCULATION (BAZETT): 382 MS
EKG QTC CALCULATION (BAZETT): 394 MS
EKG R AXIS: -44 DEGREES
EKG R AXIS: -49 DEGREES
EKG T AXIS: 41 DEGREES
EKG T AXIS: 87 DEGREES
EKG VENTRICULAR RATE: 68 BPM
EKG VENTRICULAR RATE: 83 BPM
EPI CELLS #/AREA URNS AUTO: 1 /HPF (ref 0–5)
EST. AVERAGE GLUCOSE BLD GHB EST-MCNC: 93.9 MG/DL
GFR SERPLBLD CREATININE-BSD FMLA CKD-EPI: 62 ML/MIN/{1.73_M2}
GLUCOSE BLD-MCNC: 89 MG/DL (ref 70–99)
GLUCOSE SERPL-MCNC: 92 MG/DL (ref 70–99)
GLUCOSE UR STRIP.AUTO-MCNC: NEGATIVE MG/DL
HBA1C MFR BLD: 4.9 %
HCT VFR BLD AUTO: 40.7 % (ref 36–48)
HDLC SERPL-MCNC: 38 MG/DL (ref 40–60)
HGB BLD-MCNC: 14 G/DL (ref 12–16)
HGB UR QL STRIP.AUTO: NEGATIVE
HYALINE CASTS #/AREA URNS AUTO: 0 /LPF (ref 0–8)
KETONES UR STRIP.AUTO-MCNC: NEGATIVE MG/DL
LDLC SERPL CALC-MCNC: 83 MG/DL
LEUKOCYTE ESTERASE UR QL STRIP.AUTO: ABNORMAL
MCH RBC QN AUTO: 31.2 PG (ref 26–34)
MCHC RBC AUTO-ENTMCNC: 34.4 G/DL (ref 31–36)
MCV RBC AUTO: 90.8 FL (ref 80–100)
NITRITE UR QL STRIP.AUTO: POSITIVE
NT-PROBNP SERPL-MCNC: 472 PG/ML (ref 0–449)
PERFORMED ON: NORMAL
PH UR STRIP.AUTO: 5.5 [PH] (ref 5–8)
PLATELET # BLD AUTO: 227 K/UL (ref 135–450)
PMV BLD AUTO: 6.9 FL (ref 5–10.5)
POTASSIUM SERPL-SCNC: 3.4 MMOL/L (ref 3.5–5.1)
PROT SERPL-MCNC: 6.2 G/DL (ref 6.4–8.2)
PROT UR STRIP.AUTO-MCNC: NEGATIVE MG/DL
RBC # BLD AUTO: 4.48 M/UL (ref 4–5.2)
RBC CLUMPS #/AREA URNS AUTO: 0 /HPF (ref 0–4)
SODIUM SERPL-SCNC: 140 MMOL/L (ref 136–145)
SP GR UR STRIP.AUTO: 1.01 (ref 1–1.03)
TRIGL SERPL-MCNC: 95 MG/DL (ref 0–150)
TROPONIN, HIGH SENSITIVITY: 227 NG/L (ref 0–14)
TROPONIN, HIGH SENSITIVITY: 355 NG/L (ref 0–14)
TROPONIN, HIGH SENSITIVITY: 423 NG/L (ref 0–14)
UA COMPLETE W REFLEX CULTURE PNL UR: ABNORMAL
UA DIPSTICK W REFLEX MICRO PNL UR: YES
URN SPEC COLLECT METH UR: ABNORMAL
UROBILINOGEN UR STRIP-ACNC: 0.2 E.U./DL
VAS LEFT ARM BP: 138 MMHG
VAS LEFT CCA DIST EDV: 18.8 CM/S
VAS LEFT CCA DIST PSV: 49 CM/S
VAS LEFT CCA MID EDV: 15.3 CM/S
VAS LEFT CCA MID PSV: 51.3 CM/S
VAS LEFT CCA PROX EDV: 15.3 CM/S
VAS LEFT CCA PROX PSV: 45.9 CM/S
VAS LEFT ECA EDV: 12.9 CM/S
VAS LEFT ECA PSV: 54.9 CM/S
VAS LEFT ICA DIST EDV: 22 CM/S
VAS LEFT ICA DIST PSV: 56.4 CM/S
VAS LEFT ICA MID EDV: 21.2 CM/S
VAS LEFT ICA MID PSV: 77.6 CM/S
VAS LEFT ICA PROX PSV: 43.9 CM/S
VAS LEFT ICA/CCA PSV: 1.51
VAS LEFT SUBCLAVIAN PROX PSV: 68.8 CM/S
VAS LEFT VERTEBRAL EDV: 12.9 CM/S
VAS LEFT VERTEBRAL PSV: 26 CM/S
VAS RIGHT CCA DIST EDV: 18.4 CM/S
VAS RIGHT CCA DIST PSV: 52.7 CM/S
VAS RIGHT CCA MID EDV: 14.9 CM/S
VAS RIGHT CCA MID PSV: 56.2 CM/S
VAS RIGHT CCA PROX EDV: 14 CM/S
VAS RIGHT CCA PROX PSV: 44.3 CM/S
VAS RIGHT ECA EDV: 12.7 CM/S
VAS RIGHT ECA PSV: 58.4 CM/S
VAS RIGHT ICA DIST EDV: 21.5 CM/S
VAS RIGHT ICA DIST PSV: 53.6 CM/S
VAS RIGHT ICA MID EDV: 20.2 CM/S
VAS RIGHT ICA MID PSV: 52.2 CM/S
VAS RIGHT ICA PROX EDV: 17.6 CM/S
VAS RIGHT ICA PROX PSV: 41.3 CM/S
VAS RIGHT ICA/CCA PSV: 0.93
VAS RIGHT SUBCLAVIAN PROX PSV: 92.6 CM/S
VAS RIGHT VERTEBRAL EDV: 11.9 CM/S
VAS RIGHT VERTEBRAL PSV: 25.4 CM/S
VLDLC SERPL CALC-MCNC: 19 MG/DL
WBC # BLD AUTO: 10.6 K/UL (ref 4–11)
WBC #/AREA URNS AUTO: 7 /HPF (ref 0–5)

## 2025-09-03 PROCEDURE — 92610 EVALUATE SWALLOWING FUNCTION: CPT

## 2025-09-03 PROCEDURE — 93306 TTE W/DOPPLER COMPLETE: CPT | Performed by: STUDENT IN AN ORGANIZED HEALTH CARE EDUCATION/TRAINING PROGRAM

## 2025-09-03 PROCEDURE — 99222 1ST HOSP IP/OBS MODERATE 55: CPT | Performed by: INTERNAL MEDICINE

## 2025-09-03 PROCEDURE — 85027 COMPLETE CBC AUTOMATED: CPT

## 2025-09-03 PROCEDURE — 70551 MRI BRAIN STEM W/O DYE: CPT

## 2025-09-03 PROCEDURE — 1200000000 HC SEMI PRIVATE

## 2025-09-03 PROCEDURE — 94760 N-INVAS EAR/PLS OXIMETRY 1: CPT

## 2025-09-03 PROCEDURE — 97530 THERAPEUTIC ACTIVITIES: CPT

## 2025-09-03 PROCEDURE — G0378 HOSPITAL OBSERVATION PER HR: HCPCS

## 2025-09-03 PROCEDURE — 2500000003 HC RX 250 WO HCPCS: Performed by: INTERNAL MEDICINE

## 2025-09-03 PROCEDURE — 93880 EXTRACRANIAL BILAT STUDY: CPT | Performed by: INTERNAL MEDICINE

## 2025-09-03 PROCEDURE — 36415 COLL VENOUS BLD VENIPUNCTURE: CPT

## 2025-09-03 PROCEDURE — 97166 OT EVAL MOD COMPLEX 45 MIN: CPT

## 2025-09-03 PROCEDURE — 92526 ORAL FUNCTION THERAPY: CPT

## 2025-09-03 PROCEDURE — 93010 ELECTROCARDIOGRAM REPORT: CPT | Performed by: INTERNAL MEDICINE

## 2025-09-03 PROCEDURE — C8929 TTE W OR WO FOL WCON,DOPPLER: HCPCS

## 2025-09-03 PROCEDURE — 80053 COMPREHEN METABOLIC PANEL: CPT

## 2025-09-03 PROCEDURE — 84484 ASSAY OF TROPONIN QUANT: CPT

## 2025-09-03 PROCEDURE — 6370000000 HC RX 637 (ALT 250 FOR IP): Performed by: INTERNAL MEDICINE

## 2025-09-03 PROCEDURE — 81001 URINALYSIS AUTO W/SCOPE: CPT

## 2025-09-03 PROCEDURE — 97116 GAIT TRAINING THERAPY: CPT

## 2025-09-03 PROCEDURE — 6360000004 HC RX CONTRAST MEDICATION: Performed by: INTERNAL MEDICINE

## 2025-09-03 PROCEDURE — 92523 SPEECH SOUND LANG COMPREHEN: CPT

## 2025-09-03 PROCEDURE — 83036 HEMOGLOBIN GLYCOSYLATED A1C: CPT

## 2025-09-03 PROCEDURE — 6360000002 HC RX W HCPCS: Performed by: INTERNAL MEDICINE

## 2025-09-03 PROCEDURE — APPNB30 APP NON BILLABLE TIME 0-30 MINS

## 2025-09-03 PROCEDURE — 6370000000 HC RX 637 (ALT 250 FOR IP): Performed by: STUDENT IN AN ORGANIZED HEALTH CARE EDUCATION/TRAINING PROGRAM

## 2025-09-03 PROCEDURE — 93880 EXTRACRANIAL BILAT STUDY: CPT

## 2025-09-03 PROCEDURE — 80061 LIPID PANEL: CPT

## 2025-09-03 PROCEDURE — 99223 1ST HOSP IP/OBS HIGH 75: CPT | Performed by: PSYCHIATRY & NEUROLOGY

## 2025-09-03 PROCEDURE — 97162 PT EVAL MOD COMPLEX 30 MIN: CPT

## 2025-09-03 PROCEDURE — APPSS60 APP SPLIT SHARED TIME 46-60 MINUTES

## 2025-09-03 PROCEDURE — 83880 ASSAY OF NATRIURETIC PEPTIDE: CPT

## 2025-09-03 RX ORDER — METOPROLOL TARTRATE 25 MG/1
25 TABLET, FILM COATED ORAL 2 TIMES DAILY
Status: DISCONTINUED | OUTPATIENT
Start: 2025-09-03 | End: 2025-09-04 | Stop reason: HOSPADM

## 2025-09-03 RX ORDER — AMLODIPINE BESYLATE 5 MG/1
5 TABLET ORAL DAILY
Status: DISCONTINUED | OUTPATIENT
Start: 2025-09-03 | End: 2025-09-04 | Stop reason: HOSPADM

## 2025-09-03 RX ORDER — POTASSIUM CHLORIDE 1500 MG/1
40 TABLET, EXTENDED RELEASE ORAL ONCE
Status: COMPLETED | OUTPATIENT
Start: 2025-09-03 | End: 2025-09-03

## 2025-09-03 RX ADMIN — AMLODIPINE BESYLATE 5 MG: 5 TABLET ORAL at 17:03

## 2025-09-03 RX ADMIN — SULFUR HEXAFLUORIDE 2 ML: 60.7; .19; .19 INJECTION, POWDER, LYOPHILIZED, FOR SUSPENSION INTRAVENOUS; INTRAVESICAL at 16:45

## 2025-09-03 RX ADMIN — ATORVASTATIN CALCIUM 40 MG: 40 TABLET, FILM COATED ORAL at 20:14

## 2025-09-03 RX ADMIN — POTASSIUM CHLORIDE 40 MEQ: 1500 TABLET, EXTENDED RELEASE ORAL at 09:13

## 2025-09-03 RX ADMIN — SODIUM CHLORIDE, PRESERVATIVE FREE 10 ML: 5 INJECTION INTRAVENOUS at 20:20

## 2025-09-03 RX ADMIN — SODIUM CHLORIDE, PRESERVATIVE FREE 10 ML: 5 INJECTION INTRAVENOUS at 09:13

## 2025-09-03 RX ADMIN — ASPIRIN 81 MG: 81 TABLET, CHEWABLE ORAL at 09:13

## 2025-09-03 RX ADMIN — METOPROLOL TARTRATE 25 MG: 25 TABLET, FILM COATED ORAL at 20:14

## 2025-09-03 RX ADMIN — ENOXAPARIN SODIUM 40 MG: 100 INJECTION SUBCUTANEOUS at 09:13

## 2025-09-03 ASSESSMENT — PAIN SCALES - GENERAL
PAINLEVEL_OUTOF10: 0
PAINLEVEL_OUTOF10: 0
PAINLEVEL_OUTOF10: 3
PAINLEVEL_OUTOF10: 0

## 2025-09-04 ENCOUNTER — HOSPITAL ENCOUNTER (INPATIENT)
Age: 85
DRG: 056 | End: 2025-09-04
Attending: PHYSICAL MEDICINE & REHABILITATION | Admitting: PHYSICAL MEDICINE & REHABILITATION
Payer: MEDICARE

## 2025-09-04 ENCOUNTER — ANCILLARY PROCEDURE (OUTPATIENT)
Dept: CARDIOLOGY CLINIC | Age: 85
End: 2025-09-04

## 2025-09-04 VITALS
DIASTOLIC BLOOD PRESSURE: 84 MMHG | RESPIRATION RATE: 15 BRPM | SYSTOLIC BLOOD PRESSURE: 139 MMHG | BODY MASS INDEX: 30.43 KG/M2 | WEIGHT: 161.2 LBS | HEART RATE: 67 BPM | OXYGEN SATURATION: 96 % | TEMPERATURE: 97.4 F | HEIGHT: 61 IN

## 2025-09-04 DIAGNOSIS — I63.432 CEREBROVASCULAR ACCIDENT (CVA) DUE TO EMBOLISM OF LEFT POSTERIOR CEREBRAL ARTERY (HCC): ICD-10-CM

## 2025-09-04 PROBLEM — I63.9 ACUTE CEREBROVASCULAR ACCIDENT (CVA) (HCC): Status: ACTIVE | Noted: 2025-09-04

## 2025-09-04 LAB — ECHO BSA: 1.8 M2

## 2025-09-04 PROCEDURE — 2500000003 HC RX 250 WO HCPCS: Performed by: INTERNAL MEDICINE

## 2025-09-04 PROCEDURE — 2500000003 HC RX 250 WO HCPCS: Performed by: PHYSICAL MEDICINE & REHABILITATION

## 2025-09-04 PROCEDURE — 97535 SELF CARE MNGMENT TRAINING: CPT

## 2025-09-04 PROCEDURE — APPSS30 APP SPLIT SHARED TIME 16-30 MINUTES

## 2025-09-04 PROCEDURE — 1280000000 HC REHAB R&B

## 2025-09-04 PROCEDURE — 6370000000 HC RX 637 (ALT 250 FOR IP): Performed by: INTERNAL MEDICINE

## 2025-09-04 PROCEDURE — 99232 SBSQ HOSP IP/OBS MODERATE 35: CPT | Performed by: PSYCHIATRY & NEUROLOGY

## 2025-09-04 PROCEDURE — 97116 GAIT TRAINING THERAPY: CPT

## 2025-09-04 PROCEDURE — 6360000002 HC RX W HCPCS: Performed by: PHYSICAL MEDICINE & REHABILITATION

## 2025-09-04 PROCEDURE — 6370000000 HC RX 637 (ALT 250 FOR IP): Performed by: PHYSICAL MEDICINE & REHABILITATION

## 2025-09-04 PROCEDURE — 94760 N-INVAS EAR/PLS OXIMETRY 1: CPT

## 2025-09-04 PROCEDURE — 6360000002 HC RX W HCPCS: Performed by: INTERNAL MEDICINE

## 2025-09-04 PROCEDURE — APPNB30 APP NON BILLABLE TIME 0-30 MINS

## 2025-09-04 PROCEDURE — 97530 THERAPEUTIC ACTIVITIES: CPT

## 2025-09-04 PROCEDURE — 6370000000 HC RX 637 (ALT 250 FOR IP): Performed by: STUDENT IN AN ORGANIZED HEALTH CARE EDUCATION/TRAINING PROGRAM

## 2025-09-04 RX ORDER — NITROGLYCERIN 0.4 MG/1
0.4 TABLET SUBLINGUAL PRN
Status: ACTIVE | OUTPATIENT
Start: 2025-09-04

## 2025-09-04 RX ORDER — ONDANSETRON 2 MG/ML
4 INJECTION INTRAMUSCULAR; INTRAVENOUS EVERY 6 HOURS PRN
Status: CANCELLED | OUTPATIENT
Start: 2025-09-04

## 2025-09-04 RX ORDER — ATORVASTATIN CALCIUM 40 MG/1
40 TABLET, FILM COATED ORAL NIGHTLY
Qty: 30 TABLET | Refills: 3 | Status: ON HOLD | OUTPATIENT
Start: 2025-09-04

## 2025-09-04 RX ORDER — ASPIRIN 81 MG/1
81 TABLET, CHEWABLE ORAL DAILY
Status: CANCELLED | OUTPATIENT
Start: 2025-09-05

## 2025-09-04 RX ORDER — SODIUM CHLORIDE 0.9 % (FLUSH) 0.9 %
5-40 SYRINGE (ML) INJECTION PRN
Status: CANCELLED | OUTPATIENT
Start: 2025-09-04

## 2025-09-04 RX ORDER — ONDANSETRON 2 MG/ML
4 INJECTION INTRAMUSCULAR; INTRAVENOUS EVERY 6 HOURS PRN
Status: ACTIVE | OUTPATIENT
Start: 2025-09-04

## 2025-09-04 RX ORDER — ASPIRIN 81 MG/1
81 TABLET ORAL DAILY
Qty: 90 TABLET | Refills: 1 | Status: ON HOLD | OUTPATIENT
Start: 2025-09-04

## 2025-09-04 RX ORDER — BISACODYL 5 MG/1
5 TABLET, DELAYED RELEASE ORAL DAILY
Status: DISPENSED | OUTPATIENT
Start: 2025-09-04

## 2025-09-04 RX ORDER — NITROGLYCERIN 0.4 MG/1
0.4 TABLET SUBLINGUAL PRN
Status: CANCELLED | OUTPATIENT
Start: 2025-09-04

## 2025-09-04 RX ORDER — METOPROLOL TARTRATE 25 MG/1
25 TABLET, FILM COATED ORAL 2 TIMES DAILY
Status: CANCELLED | OUTPATIENT
Start: 2025-09-04

## 2025-09-04 RX ORDER — ASPIRIN 300 MG/1
300 SUPPOSITORY RECTAL DAILY
Status: CANCELLED | OUTPATIENT
Start: 2025-09-05

## 2025-09-04 RX ORDER — SODIUM CHLORIDE 0.9 % (FLUSH) 0.9 %
5-40 SYRINGE (ML) INJECTION EVERY 12 HOURS SCHEDULED
Status: CANCELLED | OUTPATIENT
Start: 2025-09-04

## 2025-09-04 RX ORDER — AMLODIPINE BESYLATE 5 MG/1
5 TABLET ORAL DAILY
Status: CANCELLED | OUTPATIENT
Start: 2025-09-05

## 2025-09-04 RX ORDER — ENOXAPARIN SODIUM 100 MG/ML
40 INJECTION SUBCUTANEOUS DAILY
Status: DISPENSED | OUTPATIENT
Start: 2025-09-04

## 2025-09-04 RX ORDER — SODIUM CHLORIDE 0.9 % (FLUSH) 0.9 %
5-40 SYRINGE (ML) INJECTION PRN
Status: ACTIVE | OUTPATIENT
Start: 2025-09-04

## 2025-09-04 RX ORDER — METOPROLOL TARTRATE 25 MG/1
25 TABLET, FILM COATED ORAL 2 TIMES DAILY
Qty: 60 TABLET | Refills: 3 | Status: ON HOLD | OUTPATIENT
Start: 2025-09-04

## 2025-09-04 RX ORDER — METOPROLOL TARTRATE 25 MG/1
25 TABLET, FILM COATED ORAL 2 TIMES DAILY
Status: DISPENSED | OUTPATIENT
Start: 2025-09-04

## 2025-09-04 RX ORDER — ONDANSETRON 4 MG/1
4 TABLET, ORALLY DISINTEGRATING ORAL EVERY 8 HOURS PRN
Status: ACTIVE | OUTPATIENT
Start: 2025-09-04

## 2025-09-04 RX ORDER — BISACODYL 5 MG/1
5 TABLET, DELAYED RELEASE ORAL DAILY
Status: CANCELLED | OUTPATIENT
Start: 2025-09-04

## 2025-09-04 RX ORDER — ATORVASTATIN CALCIUM 40 MG/1
40 TABLET, FILM COATED ORAL NIGHTLY
Status: CANCELLED | OUTPATIENT
Start: 2025-09-04

## 2025-09-04 RX ORDER — AMLODIPINE BESYLATE 5 MG/1
5 TABLET ORAL DAILY
Status: DISPENSED | OUTPATIENT
Start: 2025-09-05

## 2025-09-04 RX ORDER — ONDANSETRON 4 MG/1
4 TABLET, ORALLY DISINTEGRATING ORAL EVERY 8 HOURS PRN
Status: CANCELLED | OUTPATIENT
Start: 2025-09-04

## 2025-09-04 RX ORDER — ACETAMINOPHEN 325 MG/1
650 TABLET ORAL EVERY 4 HOURS PRN
Status: CANCELLED | OUTPATIENT
Start: 2025-09-04

## 2025-09-04 RX ORDER — ENOXAPARIN SODIUM 100 MG/ML
40 INJECTION SUBCUTANEOUS DAILY
Status: CANCELLED | OUTPATIENT
Start: 2025-09-04

## 2025-09-04 RX ORDER — ATORVASTATIN CALCIUM 40 MG/1
40 TABLET, FILM COATED ORAL NIGHTLY
Status: DISPENSED | OUTPATIENT
Start: 2025-09-04

## 2025-09-04 RX ORDER — ACETAMINOPHEN 325 MG/1
650 TABLET ORAL EVERY 4 HOURS PRN
Status: ACTIVE | OUTPATIENT
Start: 2025-09-04

## 2025-09-04 RX ORDER — POLYETHYLENE GLYCOL 3350 17 G/17G
17 POWDER, FOR SOLUTION ORAL DAILY PRN
Status: CANCELLED | OUTPATIENT
Start: 2025-09-04

## 2025-09-04 RX ORDER — SODIUM CHLORIDE 0.9 % (FLUSH) 0.9 %
5-40 SYRINGE (ML) INJECTION EVERY 12 HOURS SCHEDULED
Status: DISCONTINUED | OUTPATIENT
Start: 2025-09-04 | End: 2025-09-06

## 2025-09-04 RX ORDER — ASPIRIN 300 MG/1
300 SUPPOSITORY RECTAL DAILY
Status: DISPENSED | OUTPATIENT
Start: 2025-09-05

## 2025-09-04 RX ORDER — POLYETHYLENE GLYCOL 3350 17 G/17G
17 POWDER, FOR SOLUTION ORAL DAILY PRN
Status: ACTIVE | OUTPATIENT
Start: 2025-09-04

## 2025-09-04 RX ORDER — ASPIRIN 81 MG/1
81 TABLET, CHEWABLE ORAL DAILY
Status: DISPENSED | OUTPATIENT
Start: 2025-09-05

## 2025-09-04 RX ADMIN — ENOXAPARIN SODIUM 40 MG: 100 INJECTION SUBCUTANEOUS at 10:05

## 2025-09-04 RX ADMIN — ASPIRIN 81 MG: 81 TABLET, CHEWABLE ORAL at 10:05

## 2025-09-04 RX ADMIN — ENOXAPARIN SODIUM 40 MG: 100 INJECTION SUBCUTANEOUS at 21:34

## 2025-09-04 RX ADMIN — ATORVASTATIN CALCIUM 40 MG: 40 TABLET, FILM COATED ORAL at 21:35

## 2025-09-04 RX ADMIN — SODIUM CHLORIDE, PRESERVATIVE FREE 10 ML: 5 INJECTION INTRAVENOUS at 21:35

## 2025-09-04 RX ADMIN — METOPROLOL TARTRATE 25 MG: 25 TABLET, FILM COATED ORAL at 21:35

## 2025-09-04 RX ADMIN — AMLODIPINE BESYLATE 5 MG: 5 TABLET ORAL at 10:05

## 2025-09-04 RX ADMIN — METOPROLOL TARTRATE 25 MG: 25 TABLET, FILM COATED ORAL at 10:05

## 2025-09-04 RX ADMIN — SODIUM CHLORIDE, PRESERVATIVE FREE 10 ML: 5 INJECTION INTRAVENOUS at 10:05

## 2025-09-04 ASSESSMENT — PAIN SCALES - GENERAL
PAINLEVEL_OUTOF10: 0

## 2025-09-05 LAB
ANION GAP SERPL CALCULATED.3IONS-SCNC: 11 MMOL/L (ref 3–16)
BASOPHILS # BLD: 0.1 K/UL (ref 0–0.2)
BASOPHILS NFR BLD: 0.4 %
BUN SERPL-MCNC: 27 MG/DL (ref 7–20)
CALCIUM SERPL-MCNC: 8.7 MG/DL (ref 8.3–10.6)
CHLORIDE SERPL-SCNC: 107 MMOL/L (ref 99–110)
CO2 SERPL-SCNC: 22 MMOL/L (ref 21–32)
CREAT SERPL-MCNC: 0.9 MG/DL (ref 0.6–1.2)
DEPRECATED RDW RBC AUTO: 13.5 % (ref 12.4–15.4)
EKG ATRIAL RATE: 59 BPM
EKG DIAGNOSIS: NORMAL
EKG P AXIS: -15 DEGREES
EKG P-R INTERVAL: 262 MS
EKG Q-T INTERVAL: 422 MS
EKG QRS DURATION: 84 MS
EKG QTC CALCULATION (BAZETT): 417 MS
EKG R AXIS: -49 DEGREES
EKG T AXIS: 15 DEGREES
EKG VENTRICULAR RATE: 59 BPM
EOSINOPHIL # BLD: 0.5 K/UL (ref 0–0.6)
EOSINOPHIL NFR BLD: 4.2 %
GFR SERPLBLD CREATININE-BSD FMLA CKD-EPI: 62 ML/MIN/{1.73_M2}
GLUCOSE SERPL-MCNC: 93 MG/DL (ref 70–99)
HCT VFR BLD AUTO: 40.8 % (ref 36–48)
HGB BLD-MCNC: 13.9 G/DL (ref 12–16)
LYMPHOCYTES # BLD: 5 K/UL (ref 1–5.1)
LYMPHOCYTES NFR BLD: 42.6 %
MCH RBC QN AUTO: 31 PG (ref 26–34)
MCHC RBC AUTO-ENTMCNC: 34 G/DL (ref 31–36)
MCV RBC AUTO: 91.1 FL (ref 80–100)
MONOCYTES # BLD: 0.7 K/UL (ref 0–1.3)
MONOCYTES NFR BLD: 5.9 %
NEUTROPHILS # BLD: 5.5 K/UL (ref 1.7–7.7)
NEUTROPHILS NFR BLD: 46.9 %
PLATELET # BLD AUTO: 242 K/UL (ref 135–450)
PMV BLD AUTO: 7.1 FL (ref 5–10.5)
POTASSIUM SERPL-SCNC: 3.7 MMOL/L (ref 3.5–5.1)
RBC # BLD AUTO: 4.48 M/UL (ref 4–5.2)
SODIUM SERPL-SCNC: 140 MMOL/L (ref 136–145)
WBC # BLD AUTO: 11.7 K/UL (ref 4–11)

## 2025-09-05 PROCEDURE — 6370000000 HC RX 637 (ALT 250 FOR IP): Performed by: PHYSICAL MEDICINE & REHABILITATION

## 2025-09-05 PROCEDURE — 93005 ELECTROCARDIOGRAM TRACING: CPT | Performed by: PHYSICAL MEDICINE & REHABILITATION

## 2025-09-05 PROCEDURE — 80048 BASIC METABOLIC PNL TOTAL CA: CPT

## 2025-09-05 PROCEDURE — 97161 PT EVAL LOW COMPLEX 20 MIN: CPT

## 2025-09-05 PROCEDURE — 93010 ELECTROCARDIOGRAM REPORT: CPT | Performed by: INTERNAL MEDICINE

## 2025-09-05 PROCEDURE — 92610 EVALUATE SWALLOWING FUNCTION: CPT

## 2025-09-05 PROCEDURE — 1280000000 HC REHAB R&B

## 2025-09-05 PROCEDURE — 6360000002 HC RX W HCPCS: Performed by: PHYSICAL MEDICINE & REHABILITATION

## 2025-09-05 PROCEDURE — 2500000003 HC RX 250 WO HCPCS: Performed by: PHYSICAL MEDICINE & REHABILITATION

## 2025-09-05 PROCEDURE — 97530 THERAPEUTIC ACTIVITIES: CPT

## 2025-09-05 PROCEDURE — 36415 COLL VENOUS BLD VENIPUNCTURE: CPT

## 2025-09-05 PROCEDURE — 92523 SPEECH SOUND LANG COMPREHEN: CPT

## 2025-09-05 PROCEDURE — 85025 COMPLETE CBC W/AUTO DIFF WBC: CPT

## 2025-09-05 PROCEDURE — 97165 OT EVAL LOW COMPLEX 30 MIN: CPT

## 2025-09-05 PROCEDURE — 97535 SELF CARE MNGMENT TRAINING: CPT

## 2025-09-05 RX ORDER — PANTOPRAZOLE SODIUM 40 MG/1
40 TABLET, DELAYED RELEASE ORAL
Status: DISPENSED | OUTPATIENT
Start: 2025-09-06

## 2025-09-05 RX ADMIN — METOPROLOL TARTRATE 25 MG: 25 TABLET, FILM COATED ORAL at 21:03

## 2025-09-05 RX ADMIN — AMLODIPINE BESYLATE 5 MG: 5 TABLET ORAL at 08:18

## 2025-09-05 RX ADMIN — ATORVASTATIN CALCIUM 40 MG: 40 TABLET, FILM COATED ORAL at 21:03

## 2025-09-05 RX ADMIN — METOPROLOL TARTRATE 25 MG: 25 TABLET, FILM COATED ORAL at 08:18

## 2025-09-05 RX ADMIN — ASPIRIN 81 MG: 81 TABLET, CHEWABLE ORAL at 08:18

## 2025-09-05 RX ADMIN — SODIUM CHLORIDE, PRESERVATIVE FREE 10 ML: 5 INJECTION INTRAVENOUS at 08:20

## 2025-09-05 RX ADMIN — ENOXAPARIN SODIUM 40 MG: 100 INJECTION SUBCUTANEOUS at 08:18

## 2025-09-05 ASSESSMENT — PAIN SCALES - GENERAL
PAINLEVEL_OUTOF10: 0
PAINLEVEL_OUTOF10: 0

## 2025-09-06 PROCEDURE — 6370000000 HC RX 637 (ALT 250 FOR IP): Performed by: PHYSICAL MEDICINE & REHABILITATION

## 2025-09-06 PROCEDURE — 97110 THERAPEUTIC EXERCISES: CPT

## 2025-09-06 PROCEDURE — 97129 THER IVNTJ 1ST 15 MIN: CPT

## 2025-09-06 PROCEDURE — 1280000000 HC REHAB R&B

## 2025-09-06 PROCEDURE — 97130 THER IVNTJ EA ADDL 15 MIN: CPT

## 2025-09-06 PROCEDURE — 6360000002 HC RX W HCPCS: Performed by: PHYSICAL MEDICINE & REHABILITATION

## 2025-09-06 PROCEDURE — 97530 THERAPEUTIC ACTIVITIES: CPT

## 2025-09-06 PROCEDURE — 94760 N-INVAS EAR/PLS OXIMETRY 1: CPT

## 2025-09-06 PROCEDURE — 97112 NEUROMUSCULAR REEDUCATION: CPT

## 2025-09-06 PROCEDURE — 97116 GAIT TRAINING THERAPY: CPT

## 2025-09-06 RX ADMIN — BISACODYL 5 MG: 5 TABLET, COATED ORAL at 09:06

## 2025-09-06 RX ADMIN — ENOXAPARIN SODIUM 40 MG: 100 INJECTION SUBCUTANEOUS at 09:05

## 2025-09-06 RX ADMIN — ATORVASTATIN CALCIUM 40 MG: 40 TABLET, FILM COATED ORAL at 19:53

## 2025-09-06 RX ADMIN — METOPROLOL TARTRATE 25 MG: 25 TABLET, FILM COATED ORAL at 19:53

## 2025-09-06 RX ADMIN — ASPIRIN 81 MG: 81 TABLET, CHEWABLE ORAL at 09:06

## 2025-09-06 RX ADMIN — PANTOPRAZOLE SODIUM 40 MG: 40 TABLET, DELAYED RELEASE ORAL at 06:09

## 2025-09-06 RX ADMIN — AMLODIPINE BESYLATE 5 MG: 5 TABLET ORAL at 09:06

## 2025-09-06 RX ADMIN — METOPROLOL TARTRATE 25 MG: 25 TABLET, FILM COATED ORAL at 09:06

## 2025-09-06 ASSESSMENT — 9 HOLE PEG TEST
TEST_RESULT: FUNCTIONAL
TESTTIME_SECONDS: 27
TESTTIME_SECONDS: 34
TEST_RESULT: FUNCTIONAL

## 2025-09-06 ASSESSMENT — PAIN SCALES - GENERAL
PAINLEVEL_OUTOF10: 0
PAINLEVEL_OUTOF10: 0

## 2025-09-07 VITALS
HEART RATE: 66 BPM | WEIGHT: 164.8 LBS | OXYGEN SATURATION: 94 % | DIASTOLIC BLOOD PRESSURE: 75 MMHG | SYSTOLIC BLOOD PRESSURE: 127 MMHG | TEMPERATURE: 98.4 F | HEIGHT: 62 IN | RESPIRATION RATE: 16 BRPM | BODY MASS INDEX: 30.33 KG/M2

## 2025-09-07 LAB
EKG ATRIAL RATE: 66 BPM
EKG DIAGNOSIS: NORMAL
EKG P AXIS: 23 DEGREES
EKG P-R INTERVAL: 260 MS
EKG Q-T INTERVAL: 372 MS
EKG QRS DURATION: 76 MS
EKG QTC CALCULATION (BAZETT): 389 MS
EKG R AXIS: -56 DEGREES
EKG T AXIS: 49 DEGREES
EKG VENTRICULAR RATE: 66 BPM

## 2025-09-07 PROCEDURE — 6360000002 HC RX W HCPCS: Performed by: PHYSICAL MEDICINE & REHABILITATION

## 2025-09-07 PROCEDURE — 94760 N-INVAS EAR/PLS OXIMETRY 1: CPT

## 2025-09-07 PROCEDURE — 1280000000 HC REHAB R&B

## 2025-09-07 PROCEDURE — 6370000000 HC RX 637 (ALT 250 FOR IP): Performed by: PHYSICAL MEDICINE & REHABILITATION

## 2025-09-07 RX ADMIN — METOPROLOL TARTRATE 25 MG: 25 TABLET, FILM COATED ORAL at 20:14

## 2025-09-07 RX ADMIN — AMLODIPINE BESYLATE 5 MG: 5 TABLET ORAL at 07:49

## 2025-09-07 RX ADMIN — ATORVASTATIN CALCIUM 40 MG: 40 TABLET, FILM COATED ORAL at 20:14

## 2025-09-07 RX ADMIN — ASPIRIN 81 MG: 81 TABLET, CHEWABLE ORAL at 07:49

## 2025-09-07 RX ADMIN — PANTOPRAZOLE SODIUM 40 MG: 40 TABLET, DELAYED RELEASE ORAL at 06:28

## 2025-09-07 RX ADMIN — ENOXAPARIN SODIUM 40 MG: 100 INJECTION SUBCUTANEOUS at 07:49

## 2025-09-07 RX ADMIN — METOPROLOL TARTRATE 25 MG: 25 TABLET, FILM COATED ORAL at 07:50

## 2025-09-07 ASSESSMENT — PAIN SCALES - GENERAL: PAINLEVEL_OUTOF10: 0

## (undated) DEVICE — SOLUTION IV IRRIG WATER 500ML POUR BRL ST 2F7113

## (undated) DEVICE — GOWN AURORA NONREINF LG: Brand: MEDLINE INDUSTRIES, INC.

## (undated) DEVICE — FORCEPS BX L240CM WRK CHN 2.8MM STD CAP W/ NDL MIC MESH

## (undated) DEVICE — SOLUTION IRRIG 500ML STRL H2O NONPYROGENIC

## (undated) DEVICE — TRAP SPEC RETRV CLR PLAS POLYP IN LN SUCT QUIK CTCH

## (undated) DEVICE — BIPOLAR ELECTROHEMOSTASIS CATHETER: Brand: INJECTION GOLD PROBE

## (undated) DEVICE — SINGLE USE AIR/WATER, SUCTION AND BIOPSY VALVES SET: Brand: ORCAPOD™

## (undated) DEVICE — ENDOSCOPIC KIT 6X3/16 FT COLON W/ 1.1 OZ 2 GWN W/O BRSH

## (undated) DEVICE — BW-412T DISP COMBO CLEANING BRUSH: Brand: SINGLE USE COMBINATION CLEANING BRUSH

## (undated) DEVICE — PROCEDURE KIT ENDOSCP CUST

## (undated) DEVICE — SET VLV 3 PC AWS DISPOSABLE GRDIAN SCOPEVALET

## (undated) DEVICE — Device: Brand: DISPOSABLE ELECTROSURGICAL SNARE

## (undated) DEVICE — AIR/WATER CLEANING ADAPTER FOR OLYMPUS® GI ENDOSCOPE: Brand: BULLDOG®

## (undated) DEVICE — MOUTHPIECE ENDOSCP L CTRL OPN AND SIDE PORTS DISP